# Patient Record
Sex: MALE | Race: WHITE | NOT HISPANIC OR LATINO | Employment: FULL TIME | URBAN - METROPOLITAN AREA
[De-identification: names, ages, dates, MRNs, and addresses within clinical notes are randomized per-mention and may not be internally consistent; named-entity substitution may affect disease eponyms.]

---

## 2017-03-20 ENCOUNTER — OFFICE VISIT (OUTPATIENT)
Dept: FAMILY MEDICINE CLINIC | Facility: CLINIC | Age: 52
End: 2017-03-20

## 2017-03-20 VITALS
HEART RATE: 75 BPM | OXYGEN SATURATION: 98 % | SYSTOLIC BLOOD PRESSURE: 128 MMHG | WEIGHT: 209.4 LBS | HEIGHT: 69 IN | DIASTOLIC BLOOD PRESSURE: 70 MMHG | BODY MASS INDEX: 31.01 KG/M2 | TEMPERATURE: 97.6 F

## 2017-03-20 DIAGNOSIS — J02.9 SORE THROAT: Primary | ICD-10-CM

## 2017-03-20 DIAGNOSIS — R53.81 MALAISE: ICD-10-CM

## 2017-03-20 DIAGNOSIS — G93.31 POSTVIRAL FATIGUE SYNDROME: ICD-10-CM

## 2017-03-20 DIAGNOSIS — J98.8 CONGESTION OF RESPIRATORY TRACT: ICD-10-CM

## 2017-03-20 DIAGNOSIS — J01.40 ACUTE PANSINUSITIS, RECURRENCE NOT SPECIFIED: ICD-10-CM

## 2017-03-20 DIAGNOSIS — R21 RASH: ICD-10-CM

## 2017-03-20 PROBLEM — IMO0001 CONGESTED: Status: ACTIVE | Noted: 2017-03-20

## 2017-03-20 PROBLEM — J32.4 PANSINUSITIS: Status: ACTIVE | Noted: 2017-03-20

## 2017-03-20 PROCEDURE — 99213 OFFICE O/P EST LOW 20 MIN: CPT | Performed by: INTERNAL MEDICINE

## 2017-03-20 RX ORDER — AMOXICILLIN 500 MG/1
1000 CAPSULE ORAL 2 TIMES DAILY
Qty: 40 CAPSULE | Refills: 0 | Status: SHIPPED | OUTPATIENT
Start: 2017-03-20 | End: 2017-08-29

## 2017-03-20 RX ORDER — CLOTRIMAZOLE AND BETAMETHASONE DIPROPIONATE 10; .64 MG/G; MG/G
CREAM TOPICAL 2 TIMES DAILY
Qty: 45 G | Refills: 2 | Status: SHIPPED | OUTPATIENT
Start: 2017-03-20 | End: 2017-08-29

## 2017-03-20 RX ORDER — AMOXICILLIN 500 MG/1
1000 CAPSULE ORAL 2 TIMES DAILY
Qty: 40 CAPSULE | Refills: 0 | Status: SHIPPED | OUTPATIENT
Start: 2017-03-20 | End: 2017-03-20 | Stop reason: SDUPTHER

## 2017-03-20 NOTE — PATIENT INSTRUCTIONS
Diagnoses and all orders for this visit:    Sore throat  Comments:  Salt water gargles  Labs tro check aso    Postviral fatigue syndrome  Comments:  Check mono test EBV titer  CBC and ASO titer    Rash lower extremities  Comments:  Rash on legs itching  Lotrisone Cream    Malaise  Comments:  Labs  Rest  Tylenol as needed    Congestion of respiratory tract  Comments:  Continue same meds    Acute pansinusitis, recurrence not specified  Comments:  Amoxil  Follow up as planned

## 2017-03-20 NOTE — PROGRESS NOTES
Ramirez Pena is a 51 y.o. male   Chief Complaint   Patient presents with   • Sore Throat   • Nasal Congestion       Subjective   History of Present Illness     Ramirez Pena is a 51 y.o.male who presents with:sore throat, cough and congestion.  Has blah, achy feel, sinusitis.  Sitting up okay but fatigued and dizzy.  Rash developed on back of the right leg just before this broke out.  Friend with knee replacement, he does not want to expose him to infections.      The following portions of the patient's history were reviewed and updated as appropriate: past medical history, surgeries, family history, allergies, current medications, past social history and problem list.    A comprehensive review of 14 systems was peformed  Review of Systems   Constitutional: Negative for chills, fatigue, fever and unexpected weight change.        Patient reports fatigue and dizziness as symptoms associated with his upper respiratory   HENT: Positive for congestion and sore throat. Negative for ear pain, hearing loss, sinus pressure and tinnitus.    Eyes: Negative for pain, discharge and redness.   Respiratory: Negative for cough, shortness of breath and wheezing.    Cardiovascular: Negative for chest pain, palpitations and leg swelling.   Gastrointestinal: Negative for abdominal pain, constipation, diarrhea and nausea.   Endocrine: Negative for cold intolerance and heat intolerance.   Genitourinary: Negative for difficulty urinating, flank pain and urgency.   Musculoskeletal: Negative for back pain, joint swelling and myalgias.   Skin: Negative for rash and wound.        Patient reports rash of lower extremities came on in the last 24 hours.   Allergic/Immunologic: Negative for environmental allergies and food allergies.   Neurological: Negative for dizziness, seizures, numbness and headaches.   Hematological: Negative for adenopathy. Does not bruise/bleed easily.   Psychiatric/Behavioral: Negative for decreased concentration,  "dysphoric mood and sleep disturbance. The patient is not nervous/anxious.    All other systems reviewed and are negative.      I have reviewed the patient's medical history in detail and updated the computerized patient record.      Objective   Vitals:    03/20/17 1832   BP: 128/70   BP Location: Left arm   Patient Position: Sitting   Cuff Size: Adult   Pulse: 75   Temp: 97.6 °F (36.4 °C)   TempSrc: Oral   SpO2: 98%   Weight: 209 lb 6.4 oz (95 kg)   Height: 69\" (175.3 cm)           Physical Exam   Constitutional: He is oriented to person, place, and time. He appears well-developed and well-nourished. He is active and cooperative.  Non-toxic appearance. No distress.   HENT:   Head: Normocephalic and atraumatic. Hair is normal.   Right Ear: Hearing, tympanic membrane, external ear and ear canal normal. No drainage.   Left Ear: Hearing, tympanic membrane, external ear and ear canal normal. No drainage.   Nose: Nose normal. No rhinorrhea, nasal deformity or septal deviation.   Mouth/Throat: Oropharynx is clear and moist.   Throat red and irritated   Eyes: Conjunctivae, EOM and lids are normal. Pupils are equal, round, and reactive to light. Right eye exhibits no exudate. Left eye exhibits no exudate. Right pupil is round and reactive. Left pupil is round and reactive.   Neck: Trachea normal and normal range of motion. Neck supple. Normal carotid pulses, no hepatojugular reflux and no JVD present. Carotid bruit is not present. No tracheal deviation, no edema and normal range of motion present. No thyroid mass and no thyromegaly present.   Cardiovascular: Normal rate, regular rhythm, normal heart sounds, intact distal pulses and normal pulses.   No extrasystoles are present. PMI is not displaced.    Pulmonary/Chest: Effort normal and breath sounds normal. No accessory muscle usage. No tachypnea. No respiratory distress.   Abdominal: Soft. Normal appearance, normal aorta and bowel sounds are normal. He exhibits no " abdominal bruit. There is no hepatosplenomegaly. There is no tenderness. Hernia confirmed negative in the right inguinal area and confirmed negative in the left inguinal area.   Bowel sounds present and normal in all four quadrants   Musculoskeletal: Normal range of motion.   Lymphadenopathy: No inguinal adenopathy noted on the right or left side.   Neurological: He is alert and oriented to person, place, and time. He has normal strength and normal reflexes. No cranial nerve deficit or sensory deficit. He displays a negative Romberg sign.   Skin: Skin is warm, dry and intact. He is not diaphoretic. No pallor.   Flat, macular, non-vesicular rash noted in one small patch on lower extremities is present bilaterally   Psychiatric: He has a normal mood and affect. His speech is normal and behavior is normal. Judgment and thought content normal. Cognition and memory are normal.   Nursing note and vitals reviewed.      Procedures    Reviewed consult notes from                         Assessment/Plan     Diagnoses and all orders for this visit:    Sore throat  Comments:  Salt water gargles  Labs tro check aso    Postviral fatigue syndrome  Comments:  Check mono test EBV titer  CBC and ASO titer    Rash lower extremities  Comments:  Rash on legs itching  Lotrisone Cream    Malaise  Comments:  Labs  Rest  Tylenol as needed    Congestion of respiratory tract  Comments:  Continue same meds    Acute pansinusitis, recurrence not specified  Comments:  Amoxil  Follow up as planned         Everton Zamora MD  3/20/2017  6:46 PM

## 2017-03-21 ENCOUNTER — DOCUMENTATION (OUTPATIENT)
Dept: FAMILY MEDICINE CLINIC | Facility: CLINIC | Age: 52
End: 2017-03-21

## 2017-03-25 ENCOUNTER — RESULTS ENCOUNTER (OUTPATIENT)
Dept: FAMILY MEDICINE CLINIC | Facility: CLINIC | Age: 52
End: 2017-03-25

## 2017-03-25 DIAGNOSIS — J02.9 SORE THROAT: ICD-10-CM

## 2017-03-25 DIAGNOSIS — R21 RASH: ICD-10-CM

## 2017-03-25 DIAGNOSIS — G93.31 POSTVIRAL FATIGUE SYNDROME: ICD-10-CM

## 2017-03-25 DIAGNOSIS — R53.81 MALAISE: ICD-10-CM

## 2017-03-25 DIAGNOSIS — J01.40 ACUTE PANSINUSITIS, RECURRENCE NOT SPECIFIED: ICD-10-CM

## 2017-03-25 DIAGNOSIS — J98.8 CONGESTION OF RESPIRATORY TRACT: ICD-10-CM

## 2017-03-25 LAB
ALBUMIN SERPL-MCNC: 4.5 G/DL (ref 3.5–5.2)
ALBUMIN/GLOB SERPL: 2.1 G/DL
ALP SERPL-CCNC: 54 U/L (ref 39–117)
ALT SERPL-CCNC: 44 U/L (ref 1–41)
ASO AB SERPL-ACNC: 246 IU/ML (ref 0–200)
AST SERPL-CCNC: 23 U/L (ref 1–40)
BASOPHILS # BLD AUTO: 0.01 10*3/MM3 (ref 0–0.2)
BASOPHILS NFR BLD AUTO: 0.2 % (ref 0–1.5)
BILIRUB SERPL-MCNC: 0.5 MG/DL (ref 0.1–1.2)
BUN SERPL-MCNC: 11 MG/DL (ref 6–20)
BUN/CREAT SERPL: 11.8 (ref 7–25)
CALCIUM SERPL-MCNC: 8.9 MG/DL (ref 8.6–10.5)
CHLORIDE SERPL-SCNC: 103 MMOL/L (ref 98–107)
CHOLEST SERPL-MCNC: 220 MG/DL (ref 0–200)
CO2 SERPL-SCNC: 24.7 MMOL/L (ref 22–29)
CREAT SERPL-MCNC: 0.93 MG/DL (ref 0.76–1.27)
CRP SERPL-MCNC: 0.15 MG/DL (ref 0–0.5)
EBV DNA # SERPL NAA+PROBE: NEGATIVE COPIES/ML
EBV DNA SPEC NAA+PROBE-LOG#: NORMAL LOG10 COPY/ML
EOSINOPHIL # BLD AUTO: 0.11 10*3/MM3 (ref 0–0.7)
EOSINOPHIL NFR BLD AUTO: 2.3 % (ref 0.3–6.2)
ERYTHROCYTE [DISTWIDTH] IN BLOOD BY AUTOMATED COUNT: 12.5 % (ref 11.5–14.5)
ERYTHROCYTE [SEDIMENTATION RATE] IN BLOOD BY WESTERGREN METHOD: 3 MM/HR (ref 0–20)
FT4I SERPL CALC-MCNC: 2.2 (ref 1.2–4.9)
GLOBULIN SER CALC-MCNC: 2.1 GM/DL
GLUCOSE SERPL-MCNC: 95 MG/DL (ref 65–99)
HCT VFR BLD AUTO: 43.7 % (ref 40.4–52.2)
HDLC SERPL-MCNC: 44 MG/DL (ref 40–60)
HGB BLD-MCNC: 14.8 G/DL (ref 13.7–17.6)
IMM GRANULOCYTES # BLD: 0.02 10*3/MM3 (ref 0–0.03)
IMM GRANULOCYTES NFR BLD: 0.4 % (ref 0–0.5)
LDLC SERPL CALC-MCNC: 141 MG/DL (ref 0–100)
LDLC/HDLC SERPL: 3.21 {RATIO}
LYMPHOCYTES # BLD AUTO: 1.04 10*3/MM3 (ref 0.9–4.8)
LYMPHOCYTES NFR BLD AUTO: 21.8 % (ref 19.6–45.3)
MCH RBC QN AUTO: 31.8 PG (ref 27–32.7)
MCHC RBC AUTO-ENTMCNC: 33.9 G/DL (ref 32.6–36.4)
MCV RBC AUTO: 94 FL (ref 79.8–96.2)
MONOCYTES # BLD AUTO: 0.71 10*3/MM3 (ref 0.2–1.2)
MONOCYTES NFR BLD AUTO: 14.9 % (ref 5–12)
NEUTROPHILS # BLD AUTO: 2.87 10*3/MM3 (ref 1.9–8.1)
NEUTROPHILS NFR BLD AUTO: 60.4 % (ref 42.7–76)
PLATELET # BLD AUTO: 145 10*3/MM3 (ref 140–500)
POTASSIUM SERPL-SCNC: 4.6 MMOL/L (ref 3.5–5.2)
PROT SERPL-MCNC: 6.6 G/DL (ref 6–8.5)
RBC # BLD AUTO: 4.65 10*6/MM3 (ref 4.6–6)
SODIUM SERPL-SCNC: 142 MMOL/L (ref 136–145)
T3RU NFR SERPL: 27 % (ref 24–39)
T4 SERPL-MCNC: 8.2 UG/DL (ref 4.5–12)
TRIGL SERPL-MCNC: 174 MG/DL (ref 0–150)
TSH SERPL DL<=0.005 MIU/L-ACNC: 3.33 UIU/ML (ref 0.45–4.5)
VLDLC SERPL CALC-MCNC: 34.8 MG/DL (ref 5–40)
WBC # BLD AUTO: 4.76 10*3/MM3 (ref 4.5–10.7)

## 2017-08-29 ENCOUNTER — OFFICE VISIT (OUTPATIENT)
Dept: FAMILY MEDICINE CLINIC | Facility: CLINIC | Age: 52
End: 2017-08-29

## 2017-08-29 VITALS
WEIGHT: 211 LBS | HEIGHT: 69 IN | DIASTOLIC BLOOD PRESSURE: 68 MMHG | HEART RATE: 70 BPM | BODY MASS INDEX: 31.25 KG/M2 | OXYGEN SATURATION: 97 % | TEMPERATURE: 98.8 F | SYSTOLIC BLOOD PRESSURE: 112 MMHG

## 2017-08-29 DIAGNOSIS — L23.7 POISON IVY DERMATITIS: Primary | ICD-10-CM

## 2017-08-29 PROCEDURE — 99213 OFFICE O/P EST LOW 20 MIN: CPT | Performed by: NURSE PRACTITIONER

## 2017-08-29 RX ORDER — PREDNISONE 10 MG/1
TABLET ORAL
Qty: 30 TABLET | Refills: 0 | Status: SHIPPED | OUTPATIENT
Start: 2017-08-29 | End: 2017-09-21

## 2017-08-29 RX ORDER — ALBUTEROL SULFATE 90 UG/1
2 AEROSOL, METERED RESPIRATORY (INHALATION)
COMMUNITY
Start: 2013-12-29 | End: 2019-06-11

## 2017-08-29 NOTE — PROGRESS NOTES
Carroll Pena is a 52 y.o. male presents with rash x 7 days, worsening on right hand and left hand and forearm.     Rash   This is a new problem. The current episode started in the past 7 days. The problem has been gradually worsening since onset. The affected locations include the right hand and left arm. The rash is characterized by blistering, redness and itchiness. He was exposed to plant contact. Pertinent negatives include no anorexia, congestion, cough, diarrhea, eye pain, facial edema, fatigue, fever, joint pain, nail changes, rhinorrhea, shortness of breath, sore throat or vomiting. Treatments tried: calamine. The treatment provided mild relief. There is no history of allergies, asthma, eczema or varicella.        The following portions of the patient's history were reviewed and updated as appropriate: allergies, current medications, past family history, past medical history, past social history, past surgical history and problem list.    Review of Systems   Constitutional: Negative.  Negative for fatigue and fever.   HENT: Negative.  Negative for congestion, rhinorrhea and sore throat.    Eyes: Negative.  Negative for pain.   Respiratory: Negative.  Negative for cough and shortness of breath.    Cardiovascular: Negative.    Gastrointestinal: Negative.  Negative for anorexia, diarrhea and vomiting.   Endocrine: Negative.    Genitourinary: Negative.    Musculoskeletal: Negative.  Negative for joint pain.   Skin: Positive for rash. Negative for nail changes.   Allergic/Immunologic: Negative.    Neurological: Negative.    Hematological: Negative.    Psychiatric/Behavioral: Negative.        Objective   Physical Exam   Constitutional: He is oriented to person, place, and time. He appears well-developed and well-nourished.   Cardiovascular: Normal rate, regular rhythm and normal heart sounds.  Exam reveals no gallop and no friction rub.    No murmur heard.  Pulmonary/Chest: Effort normal and breath  sounds normal. No respiratory distress. He has no wheezes. He has no rales.   Neurological: He is alert and oriented to person, place, and time.   Skin: Skin is warm and dry. Rash (vesicular linear lesions to right hand, with scattered vesicles between fingers, on left antecubital and abdomen. Pruritic. ) noted. There is erythema.   Vitals reviewed.      Assessment/Plan   Ramirez was seen today for poison ivy.    Diagnoses and all orders for this visit:    Poison ivy dermatitis    Other orders  -     predniSONE (DELTASONE) 10 MG tablet; Take 4 tabs x 3 days, then 3 tabs x 3 days, then 2 tabs x 3 days, then 1 tab x 3 days then discontinue

## 2017-08-29 NOTE — PATIENT INSTRUCTIONS
Poison Ivy Dermatitis  Poison ivy dermatitis is inflammation of the skin that is caused by the allergens on the leaves of the poison ivy plant. The skin reaction often involves redness, swelling, blisters, and extreme itching.  CAUSES  This condition is caused by a specific chemical (urushiol) found in the sap of the poison ivy plant. This chemical is sticky and can be easily spread to people, animals, and objects. You can get poison ivy dermatitis by:  · Having direct contact with a poison ivy plant.  · Touching animals, other people, or objects that have come in contact with poison ivy and have the chemical on them.  RISK FACTORS  This condition is more likely to develop in:  · People who are outdoors often.  · People who go outdoors without wearing protective clothing, such as closed shoes, long pants, and a long-sleeved shirt.  SYMPTOMS  Symptoms of this condition include:  · Redness and itching.  · A rash that often includes bumps and blisters. The rash usually appears 48 hours after exposure.  · Swelling. This may occur if the reaction is more severe.  Symptoms usually last for 1-2 weeks. However, the first time you develop this condition, symptoms may last 3-4 weeks.  DIAGNOSIS  This condition may be diagnosed based on your symptoms and a physical exam. Your health care provider may also ask you about any recent outdoor activity.  TREATMENT  Treatment for this condition will vary depending on how severe it is. Treatment may include:  · Hydrocortisone creams or calamine lotions to relieve itching.  · Oatmeal baths to soothe the skin.  · Over-the-counter antihistamine tablets.  · Oral steroid medicine for more severe outbreaks.  HOME CARE INSTRUCTIONS  · Take or apply over-the-counter and prescription medicines only as told by your health care provider.  · Wash exposed skin as soon as possible with soap and cold water.  · Use hydrocortisone creams or calamine lotion as needed to soothe the skin and relieve  itching.  · Take oatmeal baths as needed. Use colloidal oatmeal. You can get this at your local pharmacy or grocery store. Follow the instructions on the packaging.  · Do not scratch or rub your skin.  · While you have the rash, wash clothes right after you wear them.  PREVENTION  · Learn to identify the poison ivy plant and avoid contact with the plant. This plant can be recognized by the number of leaves. Generally, poison ivy has three leaves with flowering branches on a single stem. The leaves are typically glossy, and they have jagged edges that come to a point at the front.  · If you have been exposed to poison ivy, thoroughly wash with soap and water right away. You have about 30 minutes to remove the plant resin before it will cause the rash. Be sure to wash under your fingernails because any plant resin there will continue to spread the rash.  · When hiking or camping, wear clothes that will help you to avoid exposure on the skin. This includes long pants, a long-sleeved shirt, tall socks, and hiking boots. You can also apply preventive lotion to your skin to help limit exposure.  · If you suspect that your clothes or outdoor gear came in contact with poison ivy, rinse them off outside with a garden hose before you bring them inside your house.  SEEK MEDICAL CARE IF:  · You have open sores in the rash area.  · You have more redness, swelling, or pain in the affected area.  · You have redness that spreads beyond the rash area.  · You have fluid, blood, or pus coming from the affected area.  · You have a fever.  · You have a rash over a large area of your body.  · You have a rash on your eyes, mouth, or genitals.  · Your rash does not improve after a few days.  SEEK IMMEDIATE MEDICAL CARE IF:  · Your face swells or your eyes swell shut.    · You have trouble breathing.    · You have trouble swallowing.       This information is not intended to replace advice given to you by your health care provider. Make  sure you discuss any questions you have with your health care provider.     Document Released: 12/15/2001 Document Revised: 04/10/2017 Document Reviewed: 05/25/2016  [employer] Interactive Patient Education ©2017 [employer]

## 2017-09-21 ENCOUNTER — OFFICE VISIT (OUTPATIENT)
Dept: FAMILY MEDICINE CLINIC | Facility: CLINIC | Age: 52
End: 2017-09-21

## 2017-09-21 VITALS
OXYGEN SATURATION: 95 % | HEIGHT: 69 IN | BODY MASS INDEX: 31.16 KG/M2 | SYSTOLIC BLOOD PRESSURE: 120 MMHG | WEIGHT: 210.4 LBS | HEART RATE: 75 BPM | DIASTOLIC BLOOD PRESSURE: 88 MMHG | TEMPERATURE: 98.2 F

## 2017-09-21 DIAGNOSIS — J30.89 SEASONAL ALLERGIC RHINITIS DUE TO OTHER ALLERGIC TRIGGER: Primary | ICD-10-CM

## 2017-09-21 DIAGNOSIS — IMO0001 CONGESTED: ICD-10-CM

## 2017-09-21 PROCEDURE — 99213 OFFICE O/P EST LOW 20 MIN: CPT | Performed by: NURSE PRACTITIONER

## 2017-09-21 RX ORDER — AMOXICILLIN 875 MG/1
875 TABLET, COATED ORAL 2 TIMES DAILY
Qty: 20 TABLET | Refills: 0 | Status: SHIPPED | OUTPATIENT
Start: 2017-09-21 | End: 2018-08-01

## 2017-09-21 NOTE — PATIENT INSTRUCTIONS
Start Zyrtec D per label directions. You have been prescribed an antibiotic. Start this antibiotic if you develop sinus pain or worsening pressure, ear pain or fever with congestion as discussed. Increase fluids. Follow up for no improvement in symptoms.

## 2017-09-21 NOTE — PROGRESS NOTES
Subjective   Ramirez Pena is a 52 y.o. male presents for ongoing nasal congestion. Taking allegra once daily. Changed recently from zyrtec. Reports occasionally sneezing and ear congestion. Denies sore throat, sinus pain, cough or soa. Worse when laying down, increased sinus pressure. Reports increased snoring at night and dry mouth as a result.     URI    This is a new problem. The current episode started 1 to 4 weeks ago. The problem has been unchanged. There has been no fever. Associated symptoms include congestion, a plugged ear sensation and sneezing. Pertinent negatives include no abdominal pain, chest pain, coughing, diarrhea, dysuria, ear pain, headaches, joint pain, joint swelling, nausea, neck pain, rash, rhinorrhea, sinus pain, sore throat, swollen glands, vomiting or wheezing. He has tried antihistamine for the symptoms. The treatment provided mild relief.        The following portions of the patient's history were reviewed and updated as appropriate: allergies, current medications, past family history, past medical history, past social history, past surgical history and problem list.    Review of Systems   Constitutional: Negative.    HENT: Positive for congestion and sneezing. Negative for ear discharge, ear pain, postnasal drip, rhinorrhea, sinus pain and sore throat.    Eyes: Negative.    Respiratory: Negative.  Negative for cough and wheezing.    Cardiovascular: Negative.  Negative for chest pain.   Gastrointestinal: Negative.  Negative for abdominal pain, diarrhea, nausea and vomiting.   Endocrine: Negative.    Genitourinary: Negative.  Negative for dysuria.   Musculoskeletal: Negative.  Negative for joint pain and neck pain.   Skin: Negative.  Negative for rash.   Allergic/Immunologic: Negative.    Neurological: Negative.  Negative for headaches.   Hematological: Negative.    Psychiatric/Behavioral: Negative.        Objective   Physical Exam   Constitutional: He is oriented to person, place,  and time. He appears well-developed and well-nourished.   HENT:   Head: Normocephalic and atraumatic.   Cardiovascular: Normal rate, regular rhythm and normal heart sounds.  Exam reveals no gallop and no friction rub.    No murmur heard.  Pulmonary/Chest: Effort normal and breath sounds normal. No respiratory distress. He has no wheezes. He has no rales.   Abdominal: Soft. Bowel sounds are normal. He exhibits no distension. There is no tenderness.   Neurological: He is alert and oriented to person, place, and time.   Skin: Skin is warm and dry.   Psychiatric: He has a normal mood and affect.   Vitals reviewed.      Assessment/Plan   Ramirez was seen today for follow-up.    Diagnoses and all orders for this visit:    Congested    Seasonal allergic rhinitis due to other allergic trigger    Other orders  -     amoxicillin (AMOXIL) 875 MG tablet; Take 1 tablet by mouth 2 (Two) Times a Day.        A wait and see abx was prescribed. Instructed when to start antibiotic.

## 2018-03-20 ENCOUNTER — TELEPHONE (OUTPATIENT)
Dept: FAMILY MEDICINE CLINIC | Facility: CLINIC | Age: 53
End: 2018-03-20

## 2018-03-20 NOTE — TELEPHONE ENCOUNTER
Pt called and was wondering if we can call in claritin d? He cant buy anymore and he reached his limit he takes this as prescribed often cause of his allergies and sinuses?

## 2018-08-01 ENCOUNTER — OFFICE VISIT (OUTPATIENT)
Dept: FAMILY MEDICINE CLINIC | Facility: CLINIC | Age: 53
End: 2018-08-01

## 2018-08-01 VITALS
WEIGHT: 207 LBS | HEIGHT: 69 IN | HEART RATE: 77 BPM | BODY MASS INDEX: 30.66 KG/M2 | DIASTOLIC BLOOD PRESSURE: 70 MMHG | SYSTOLIC BLOOD PRESSURE: 122 MMHG | OXYGEN SATURATION: 98 % | TEMPERATURE: 98 F

## 2018-08-01 DIAGNOSIS — H60.391 OTHER INFECTIVE ACUTE OTITIS EXTERNA OF RIGHT EAR: ICD-10-CM

## 2018-08-01 DIAGNOSIS — J30.1 ALLERGIC RHINITIS DUE TO POLLEN, UNSPECIFIED CHRONICITY, UNSPECIFIED SEASONALITY: Primary | ICD-10-CM

## 2018-08-01 DIAGNOSIS — R06.83 SNORING: ICD-10-CM

## 2018-08-01 PROCEDURE — 99213 OFFICE O/P EST LOW 20 MIN: CPT | Performed by: NURSE PRACTITIONER

## 2018-08-01 RX ORDER — FEXOFENADINE HCL AND PSEUDOEPHEDRINE HCI 180; 240 MG/1; MG/1
1 TABLET, EXTENDED RELEASE ORAL DAILY
Qty: 30 TABLET | Refills: 5 | Status: SHIPPED | OUTPATIENT
Start: 2018-08-01 | End: 2019-06-11 | Stop reason: SDUPTHER

## 2018-08-01 RX ORDER — MONTELUKAST SODIUM 10 MG/1
10 TABLET ORAL NIGHTLY
Qty: 30 TABLET | Refills: 5 | Status: SHIPPED | OUTPATIENT
Start: 2018-08-01 | End: 2021-04-02 | Stop reason: SDUPTHER

## 2018-08-01 NOTE — PATIENT INSTRUCTIONS
Follow up with allergy/asthma.  May need future sleep study.  Work on weight loss.  Start allegra D and singulair.

## 2018-08-01 NOTE — PROGRESS NOTES
Subjective   Ramirez Pena is a 53 y.o. male presents with right sided sinus pain and ear pain, worse when laying down. Also with dry mouth, waking up at night snoring. Chronic sinus concerns, taking claritin D, symptoms are the same with or without allergy medication. Has been using this for a long time. Also using flonase daily. No previous allergy testing, was recommended about twenty years ago but insurance would not cover testing. Agreeable to proceed. Increased snoring, very loud where friends have made comments during recent travel. Denies daytime fatigue. Believes it is related to nasal congestion. Has considered sleep study, however states he will not be able to tolerate a cpap due to movement at night.     Sinus Problem   This is a chronic problem. The current episode started more than 1 year ago. The problem has been waxing and waning since onset. There has been no fever. He is experiencing no pain. Associated symptoms include congestion, ear pain (fullness) and sinus pressure (more on right side, will feel fluid shift ). Pertinent negatives include no chills, coughing, diaphoresis, headaches, hoarse voice, neck pain, shortness of breath, sneezing, sore throat or swollen glands. Past treatments include oral decongestants (antihistamines). The treatment provided mild relief.        The following portions of the patient's history were reviewed and updated as appropriate: allergies, current medications, past family history, past medical history, past social history, past surgical history and problem list.    Review of Systems   Constitutional: Negative.  Negative for chills, diaphoresis and fatigue.   HENT: Positive for congestion, ear pain (fullness), hearing loss (muffled, right), postnasal drip and sinus pressure (more on right side, will feel fluid shift ). Negative for ear discharge, hoarse voice, rhinorrhea, sneezing and sore throat.         Increased snoring, thought related to allergies      Respiratory: Negative for cough and shortness of breath.    Musculoskeletal: Negative for neck pain.   Neurological: Negative for headaches.       Objective   Physical Exam   Constitutional: He appears well-developed and well-nourished.   HENT:   Head: Normocephalic and atraumatic.   Right Ear: Tympanic membrane normal.   Left Ear: Tympanic membrane and ear canal normal.   Canal edematous, minimal erythema, denies pain with tragus or pinna manipulation   Eyes: Pupils are equal, round, and reactive to light.   Neck: Neck supple.   Cardiovascular: Normal rate, regular rhythm and normal heart sounds.  Exam reveals no gallop and no friction rub.    No murmur heard.  Pulmonary/Chest: Effort normal and breath sounds normal. No respiratory distress. He has no wheezes. He has no rales.   Abdominal: Soft. Bowel sounds are normal. He exhibits no distension. There is no tenderness.   Skin: Skin is warm and dry.   Psychiatric: He has a normal mood and affect.   Vitals reviewed.      Assessment/Plan   Ramirez was seen today for sinusitis.    Diagnoses and all orders for this visit:    Allergic rhinitis due to pollen, unspecified chronicity, unspecified seasonality  -     Ambulatory Referral to Allergy    Other infective acute otitis externa of right ear    Other orders  -     fexofenadine-pseudoephedrine (ALLEGRA-D ALLERGY & CONGESTION) 180-240 MG per 24 hr tablet; Take 1 tablet by mouth Daily.  -     montelukast (SINGULAIR) 10 MG tablet; Take 1 tablet by mouth Every Night.  -     neomycin-polymyxin-hydrocortisone (CORTISPORIN) 3.5-31625-9 otic solution; Administer 3 drops to the right ear 3 (Three) Times a Day.    would recommend daily allergy medication, will change from claritin D to allegra D.  Continue flonase, will add singulair.  With regards to snoring, could possibly be looking at sleep apnea, discussed after allergy evaluation, next step may be sleep study. Patient reluctant. Will work on weight loss. Does not want  to use cpap

## 2018-10-02 ENCOUNTER — OFFICE VISIT (OUTPATIENT)
Dept: SLEEP MEDICINE | Facility: HOSPITAL | Age: 53
End: 2018-10-02
Attending: INTERNAL MEDICINE

## 2018-10-02 VITALS
TEMPERATURE: 98.8 F | OXYGEN SATURATION: 96 % | DIASTOLIC BLOOD PRESSURE: 99 MMHG | HEIGHT: 69 IN | BODY MASS INDEX: 29.68 KG/M2 | SYSTOLIC BLOOD PRESSURE: 148 MMHG | HEART RATE: 88 BPM | WEIGHT: 200.4 LBS

## 2018-10-02 DIAGNOSIS — G47.33 OSA (OBSTRUCTIVE SLEEP APNEA): ICD-10-CM

## 2018-10-02 DIAGNOSIS — R06.83 SNORING: Primary | ICD-10-CM

## 2018-10-02 PROCEDURE — G0463 HOSPITAL OUTPT CLINIC VISIT: HCPCS

## 2018-10-02 NOTE — PROGRESS NOTES
[facility]      Ramirez Pena  53 y.o.  male  1965    PCP:Carmina Awad APRN  Referring physician: George Olivier MD    Type of service: Initial consult.    Chief complaint: Snoring,       History of present illness;  This is a 53 y.o. male being referred for evaluation of sleep apnea.  The patient reports symptoms of snoring, daytime excessive sleepiness and fatigue.  In addition patient also gives a history of waking up choking and gasping for breath.  Normally goes to bed around 11p.m. and wakes up around 7 a.m, still feels not rested well and tired.       Past Medical History:   Diagnosis Date   • Depression      Allergies   Witnssed apneas    Medications:    Current Outpatient Prescriptions:   •  albuterol (PROVENTIL HFA;VENTOLIN HFA) 108 (90 Base) MCG/ACT inhaler, Inhale 2 puffs., Disp: , Rfl:   •  fexofenadine-pseudoephedrine (ALLEGRA-D ALLERGY & CONGESTION) 180-240 MG per 24 hr tablet, Take 1 tablet by mouth Daily., Disp: 30 tablet, Rfl: 5  •  montelukast (SINGULAIR) 10 MG tablet, Take 1 tablet by mouth Every Night., Disp: 30 tablet, Rfl: 5  •  Multiple Vitamins-Minerals (MENS MULTI VITAMIN & MINERAL PO), Take  by mouth., Disp: , Rfl:   •  neomycin-polymyxin-hydrocortisone (CORTISPORIN) 3.5-85668-2 otic solution, Administer 3 drops to the right ear 3 (Three) Times a Day., Disp: 10 mL, Rfl: 0    Social history:  Shift work: no  Tobacco use: 1 cigar  Alcohol use: 1  Caffeinated drinks: 6-7  Over-the-counter sleeping aid: no  Narcotic medications: no    Review of systems:  [place] Sleepiness Scale: Total score: 2   Positive for snoring, witnessed apneas, fatigue and daytime excessive sleepiness,   Negative for shortness of breath, cough, wheezing, chest pain, nausea and vomiting, Swelling of feet  Morning symptoms  Dry mouth no  Moring headaches no  Nasal Congestion no  Leg movements no  Nocturia (how many times/night) 2  Memory Problems no    Physical  "exam:  Vitals:    10/02/18 1406   BP: 148/99   BP Location: Left arm   Patient Position: Sitting   Pulse: 88   Temp: 98.8 °F (37.1 °C)   TempSrc: Oral   SpO2: 96%   Weight: 90.9 kg (200 lb 6.4 oz)   Height: 175.3 cm (69\")    Body mass index is 29.59 kg/m². Neck Circumference: 17 inches  HEENT: Head is atraumatic, pupils are round equal and reacting to light,  no nasal septal defects or deviation and the nasal passages are clear, tonsils are not enlarged, oral airway Mallampati class III  NECK: No lymphadenopathy, trachea is in the midline  RESPIRATORY SYSTEM: Breath sounds are equal on both sides, there are no wheezes or crackles  CARDIOVASULAR SYSTEM: Heart sounds are regular and normal, there are no murmurs or thrills  ABDOMEN: Soft, no hepatosplenomegaly, no evidence of ascites  EXTREMITES: No cyanosis, clubbing or edema   NEUROLOGICAL SYSTEM: Oriented x 3, no gross neurological defects    Assessment and plan:  · Obstructive sleep apnea:  I strongly suspect the patient has sleep apnea as suggested by the symptoms and physical examination.  I have talked to the patient about the signs and symptoms of sleep apnea and consequences of untreated sleep apnea.  I'm going to order a home sleep test.  Will have a follow-up after this sleep test is done  · History of allergies  · Snoring      Kameron Nolasco MD, [facility]  Pulmonary, Critical Care and sleep Medicine     "

## 2018-10-18 ENCOUNTER — HOSPITAL ENCOUNTER (OUTPATIENT)
Dept: SLEEP MEDICINE | Facility: HOSPITAL | Age: 53
Discharge: HOME | End: 2018-10-18
Attending: INTERNAL MEDICINE | Admitting: INTERNAL MEDICINE

## 2018-10-18 DIAGNOSIS — R06.83 SNORING: ICD-10-CM

## 2018-10-18 DIAGNOSIS — G47.33 OSA (OBSTRUCTIVE SLEEP APNEA): ICD-10-CM

## 2018-10-18 PROCEDURE — 95806 SLEEP STUDY UNATT&RESP EFFT: CPT

## 2018-11-05 ENCOUNTER — TELEPHONE (OUTPATIENT)
Dept: SLEEP MEDICINE | Facility: HOSPITAL | Age: 53
End: 2018-11-05

## 2018-11-05 NOTE — TELEPHONE ENCOUNTER
"Spoke with patient about HST results and MD's recommendation for CPAP treatment.  Pt is amenable to CPAP trial. Pt does not have preference of DME provider, pt wanting someone \"in network\" for [facility].  Spoke to Juliane at [facility] to verify participation with pt's provider.  Per Juliane, [facility] is in network with majority of [facility] plans. Faxing setup to [facility]. jbo  "

## 2018-12-20 ENCOUNTER — OFFICE VISIT (OUTPATIENT)
Dept: SLEEP MEDICINE | Facility: HOSPITAL | Age: 53
End: 2018-12-20
Attending: INTERNAL MEDICINE

## 2018-12-20 VITALS
BODY MASS INDEX: 30.16 KG/M2 | OXYGEN SATURATION: 97 % | HEIGHT: 69 IN | DIASTOLIC BLOOD PRESSURE: 76 MMHG | HEART RATE: 75 BPM | SYSTOLIC BLOOD PRESSURE: 134 MMHG | WEIGHT: 203.6 LBS

## 2018-12-20 DIAGNOSIS — G47.33 OSA ON CPAP: Primary | ICD-10-CM

## 2018-12-20 DIAGNOSIS — Z99.89 OSA ON CPAP: Primary | ICD-10-CM

## 2018-12-20 PROBLEM — R06.83 SNORING: Status: RESOLVED | Noted: 2018-10-02 | Resolved: 2018-12-20

## 2018-12-20 PROCEDURE — G0463 HOSPITAL OUTPT CLINIC VISIT: HCPCS

## 2018-12-20 NOTE — PROGRESS NOTES
"  SLEEP CLINIC FOLLOW UP PROGRESS NOTE.    [facility] SLEEP [facility]IC[facility]    Ramirez Pena  53 y.o.  male  1965    PCP: Carmina Awad APRN      Date of visit: 12/20/2018    INTERM HISTORY:  This is a 53 y.o. years old patient who has a history of sleep apnea AHI 12.5/hr and is on autoCPAP.  Patient reports good compliance with the device and has no problems.     The Smart card download has been reviewed and shows the following..  Compliance;97 %  > 4 hr use, 97 %  Residual AHI: 2.0 /hr (normal less than 5)      PAST MEDICAL HISTORY:  · Obstructive sleep apnea  Past Medical History:   Diagnosis Date   • Depression        MEDICATIONS:    Current Outpatient Medications:   •  albuterol (PROVENTIL HFA;VENTOLIN HFA) 108 (90 Base) MCG/ACT inhaler, Inhale 2 puffs., Disp: , Rfl:   •  fexofenadine-pseudoephedrine (ALLEGRA-D ALLERGY & CONGESTION) 180-240 MG per 24 hr tablet, Take 1 tablet by mouth Daily., Disp: 30 tablet, Rfl: 5  •  montelukast (SINGULAIR) 10 MG tablet, Take 1 tablet by mouth Every Night., Disp: 30 tablet, Rfl: 5  •  Multiple Vitamins-Minerals (MENS MULTI VITAMIN & MINERAL PO), Take  by mouth., Disp: , Rfl:   •  neomycin-polymyxin-hydrocortisone (CORTISPORIN) 3.5-07253-1 otic solution, Administer 3 drops to the right ear 3 (Three) Times a Day., Disp: 10 mL, Rfl: 0    No Known Allergies    SOCIAL, FAMILY HISTORY: Medical records are reviewed and noted.    REVIEW OF SYSTEMS:   [place] Sleepiness Scale :Total score: 2   Snoring no  Feels refreshed after waking up: yes  Morning headaches no  Nasal congestion no  Leg movements no    PHYSICAL EXAMINATION:  Vitals:    12/20/18 1500   BP: 134/76   Pulse: 75   SpO2: 97%   Weight: 92.4 kg (203 lb 9.6 oz)   Height: 175.3 cm (69\")    Body mass index is 30.07 kg/m². Neck Circumference: 17.5 inches  HEENT: Unremarkable, pupils are round equal and reacting to light, nasal passage is clear   NECK: No lymphadenopathy, throat is clear, oral airway " Mallampati class III  RESPRATORY SYSTEM: Breath sounds are equal on both sides and are normal, no wheezes or crackles  CARDIOVASULAR SYSTEM: Heart rate is regular without murmur  ABDOMEN: Soft, no ascites, no hepatosplenomegaly.  EXTREMITIES: No cyanosis, clubbing or edema       ASSESSMENT AND PLAN:  · Obstructive sleep apnea, patient is using the CPAP with good compliance for treatment of sleep apnea.  I have reviewed the smart card down load and discussed with the patient the download data and encouarged the patient to continue to use the CPAP. The residual AHI is acceptable.  The device is benefiting the patient.  The patient is also instructed to get the CPAP supplies from the DME company and see me in 1 year for follow-up.  The DME company is [employer], he wants to change DME  · Obesity, I have discussed weight reduction and the health benefits.  I have also discuss the relationship between the weight and the sleep apnea and encouraged the patient to lose weight  · Snoring, resolved      Kameron Nolasco MD, FCCP  Pulmonary, Critical Care and sleep Medicine

## 2019-06-11 ENCOUNTER — OFFICE VISIT (OUTPATIENT)
Dept: FAMILY MEDICINE CLINIC | Facility: CLINIC | Age: 54
End: 2019-06-11

## 2019-06-11 ENCOUNTER — TELEPHONE (OUTPATIENT)
Dept: FAMILY MEDICINE CLINIC | Facility: CLINIC | Age: 54
End: 2019-06-11

## 2019-06-11 VITALS
RESPIRATION RATE: 16 BRPM | SYSTOLIC BLOOD PRESSURE: 136 MMHG | WEIGHT: 205.1 LBS | HEART RATE: 72 BPM | OXYGEN SATURATION: 98 % | DIASTOLIC BLOOD PRESSURE: 80 MMHG | BODY MASS INDEX: 30.38 KG/M2 | HEIGHT: 69 IN | TEMPERATURE: 98 F

## 2019-06-11 DIAGNOSIS — J02.9 SORE THROAT: ICD-10-CM

## 2019-06-11 DIAGNOSIS — Z20.818 EXPOSURE TO STREP THROAT: ICD-10-CM

## 2019-06-11 DIAGNOSIS — J06.9 ACUTE URI: Primary | ICD-10-CM

## 2019-06-11 LAB
EXPIRATION DATE: NORMAL
INTERNAL CONTROL: NORMAL
Lab: NORMAL
S PYO AG THROAT QL: NEGATIVE

## 2019-06-11 PROCEDURE — 99213 OFFICE O/P EST LOW 20 MIN: CPT | Performed by: NURSE PRACTITIONER

## 2019-06-11 PROCEDURE — 87880 STREP A ASSAY W/OPTIC: CPT | Performed by: NURSE PRACTITIONER

## 2019-06-11 RX ORDER — FEXOFENADINE HCL AND PSEUDOEPHEDRINE HCI 180; 240 MG/1; MG/1
1 TABLET, EXTENDED RELEASE ORAL DAILY
Qty: 30 TABLET | Refills: 5 | Status: SHIPPED | OUTPATIENT
Start: 2019-06-11 | End: 2020-06-17

## 2019-06-11 RX ORDER — AMOXICILLIN AND CLAVULANATE POTASSIUM 875; 125 MG/1; MG/1
1 TABLET, FILM COATED ORAL EVERY 12 HOURS SCHEDULED
Qty: 14 TABLET | Refills: 0 | Status: SHIPPED | OUTPATIENT
Start: 2019-06-11 | End: 2020-02-12

## 2019-06-11 RX ORDER — AZELASTINE 1 MG/ML
SPRAY, METERED NASAL
Refills: 6 | COMMUNITY
Start: 2019-04-22 | End: 2021-04-02 | Stop reason: SDUPTHER

## 2019-06-11 RX ORDER — METHYLPREDNISOLONE 4 MG/1
TABLET ORAL
Qty: 21 EACH | Refills: 0 | Status: SHIPPED | OUTPATIENT
Start: 2019-06-11 | End: 2020-02-12

## 2019-06-11 NOTE — PROGRESS NOTES
Subjective   Ramirez Pena is a 53 y.o. male.   Presents with 3 day history of nasal congestion, sore throat, mildly improved today, scratchy throat, no known fever or chills.   URI    This is a new problem. The current episode started in the past 7 days. The problem has been unchanged. There has been no fever. Associated symptoms include congestion and a sore throat. Pertinent negatives include no abdominal pain, chest pain, coughing, diarrhea, dysuria, ear pain, headaches, joint pain, joint swelling, nausea, neck pain, plugged ear sensation, rash, rhinorrhea, sinus pain, sneezing, swollen glands, vomiting or wheezing. He has tried nothing for the symptoms. The treatment provided no relief.        The following portions of the patient's history were reviewed and updated as appropriate: allergies, current medications, past family history, past medical history, past social history, past surgical history and problem list.    Review of Systems   Constitutional: Negative for activity change, appetite change, fatigue and fever.   HENT: Positive for congestion, postnasal drip and sore throat. Negative for ear pain, rhinorrhea, sinus pressure, sinus pain, sneezing and trouble swallowing.    Eyes: Negative.    Respiratory: Negative for cough, chest tightness, shortness of breath and wheezing.    Cardiovascular: Negative for chest pain.   Gastrointestinal: Negative for abdominal pain, diarrhea, nausea and vomiting.   Genitourinary: Negative for dysuria.   Musculoskeletal: Negative for arthralgias, joint pain and neck pain.   Skin: Negative for rash.   Neurological: Negative for dizziness and headaches.       Objective   Physical Exam   Constitutional: He is oriented to person, place, and time. He appears well-developed and well-nourished. No distress.   HENT:   Head: Normocephalic and atraumatic.   Right Ear: Tympanic membrane, external ear and ear canal normal.   Left Ear: Tympanic membrane, external ear and ear canal  normal.   Nose: Mucosal edema present. Right sinus exhibits no maxillary sinus tenderness and no frontal sinus tenderness. Left sinus exhibits no maxillary sinus tenderness and no frontal sinus tenderness.   Mouth/Throat: Uvula is midline and mucous membranes are normal. Posterior oropharyngeal erythema present. No oropharyngeal exudate.   Neck: Neck supple.   Cardiovascular: Normal rate, regular rhythm and normal heart sounds. Exam reveals no gallop and no friction rub.   No murmur heard.  Pulmonary/Chest: Effort normal and breath sounds normal. No respiratory distress. He has no wheezes. He has no rales.   Abdominal: Soft. Bowel sounds are normal. He exhibits no distension. There is no tenderness.   Neurological: He is alert and oriented to person, place, and time.   Skin: Skin is warm and dry. He is not diaphoretic.   Psychiatric: He has a normal mood and affect.   Vitals reviewed.      Assessment/Plan   Ramirez was seen today for nasal congestion.    Diagnoses and all orders for this visit:    Acute URI    Exposure to strep throat  -     POC Rapid Strep A    Sore throat  -     POC Rapid Strep A    Other orders  -     methylPREDNISolone (MEDROL, DOMINICK,) 4 MG tablet; Take as directed on package instructions.  -     fexofenadine-pseudoephedrine (ALLEGRA-D ALLERGY & CONGESTION) 180-240 MG per 24 hr tablet; Take 1 tablet by mouth Daily.  -     amoxicillin-clavulanate (AUGMENTIN) 875-125 MG per tablet; Take 1 tablet by mouth Every 12 (Twelve) Hours.      Lab Results   Component Value Date    RAPSCRN Negative 06/11/2019       Strep negative  Start medrol dose pack to reduce inflammation  Resume allegra d, short term  augmentin for wait and see abx  Increase fluids, follow up for no improvement or worsening symptoms

## 2019-12-12 ENCOUNTER — OFFICE VISIT (OUTPATIENT)
Dept: SLEEP MEDICINE | Facility: HOSPITAL | Age: 54
End: 2019-12-12
Attending: INTERNAL MEDICINE

## 2019-12-12 VITALS
OXYGEN SATURATION: 98 % | DIASTOLIC BLOOD PRESSURE: 92 MMHG | SYSTOLIC BLOOD PRESSURE: 157 MMHG | BODY MASS INDEX: 30.36 KG/M2 | HEART RATE: 80 BPM | HEIGHT: 69 IN | WEIGHT: 205 LBS

## 2019-12-12 DIAGNOSIS — Z99.89 OSA ON CPAP: Primary | ICD-10-CM

## 2019-12-12 DIAGNOSIS — G47.33 OSA ON CPAP: Primary | ICD-10-CM

## 2019-12-12 PROCEDURE — G0463 HOSPITAL OUTPT CLINIC VISIT: HCPCS

## 2019-12-12 PROCEDURE — 99213 OFFICE O/P EST LOW 20 MIN: CPT | Performed by: INTERNAL MEDICINE

## 2019-12-12 NOTE — PROGRESS NOTES
[facility]  [address] [facility]  [address]rd [place] [address]  Phone   Fax       SLEEP CLINIC FOLLOW UP PROGRESS NOTE.    Ramirez SHEN Pena  1965  54 y.o.  male      PCP: Carmina Awad APRN      Date of visit: 12/12/2019    Chief Complaint   Patient presents with   • Sleep Apnea   • Follow-up       INTERM HISTORY:  This is a 54 y.o. years old patient who has a history of sleep apnea and is on CPAP.  Patient reports good compliance with the device.  Patient has CPAP for the past year and using the machine on a regular basis and has no problems.  He wants to get a so clean and he wanted to know my opinion which I have told him that it is a good idea to have so clean as he is forgetting to clean the equipment on a regular basis    Sleep schedule  Normally goes to bed at 11 PM  Wakes up at 7;30 AM  Feels refreshed after waking up: Yes      The Smart card downloaded on 12/12/2019 has been reviewed by me and shows the following..  Compliance; 99 %  > 4 hr use, 86 %  Average use of the device 7 hours and 16 minutes per night  Residual AHI: 0.9 /hr (normal less than 5)  Mask type: Nasal mask  DME: [employer]      Past Medical History:   Diagnosis Date   • Depression    • ROBYN on CPAP     AHI 12.5       MEDICATIONS: reviewed by me    Current Outpatient Medications:   •  amoxicillin-clavulanate (AUGMENTIN) 875-125 MG per tablet, Take 1 tablet by mouth Every 12 (Twelve) Hours., Disp: 14 tablet, Rfl: 0  •  azelastine (ASTELIN) 0.1 % nasal spray, INHALE 2 SPRAYS IEN QHS, Disp: , Rfl: 6  •  fexofenadine-pseudoephedrine (ALLEGRA-D ALLERGY & CONGESTION) 180-240 MG per 24 hr tablet, Take 1 tablet by mouth Daily., Disp: 30 tablet, Rfl: 5  •  methylPREDNISolone (MEDROL, DOMINICK,) 4 MG tablet, Take as directed on package instructions., Disp: 21 each, Rfl: 0  •  montelukast (SINGULAIR) 10 MG tablet, Take 1 tablet by mouth Every Night., Disp: 30 tablet, Rfl: 5  •  Multiple Vitamins-Minerals  "(MENS MULTI VITAMIN & MINERAL PO), Take  by mouth., Disp: , Rfl:   •  neomycin-polymyxin-hydrocortisone (CORTISPORIN) 3.5-10622-4 otic solution, Administer 3 drops to the right ear 3 (Three) Times a Day., Disp: 10 mL, Rfl: 0    No Known Allergies reviewed by me    SOCIAL, FAMILY HISTORY: Medical records are reviewed and noted by me.    REVIEW OF SYSTEMS:   [place] Sleepiness Scale :Total score: 4   Snoring: No  Morning headache: No  Nasal congestion: No  Leg movements: No  Number of times you wake up once asleep: 1  Heart burn no    PHYSICAL EXAMINATION:  Vitals:    12/12/19 1500   BP: 157/92   BP Location: Left arm   Patient Position: Sitting   Pulse: 80   SpO2: 98%   Weight: 93 kg (205 lb)   Height: 175.3 cm (69\")    Body mass index is 30.27 kg/m².    HEENT: pupils are round equal and reacting to light and accommodation, nasal passage is clear, no nasal polyps, no lymphadenopathy, throat is clear, oral airway Mallampati class 3  RESPRATORY SYSTEM: Breath sounds are equal on both sides and are normal, no wheezes or crackles  CARDIOVASULAR SYSTEM: Heart rate is regular without murmur  ABDOMEN: Soft, no ascites, no hepatosplenomegaly.  EXTREMITIES: No cyanosis, clubbing or edema       ASSESSMENT AND PLAN:  · Obstructive sleep apnea, patient is using the CPAP with good compliance for treatment of sleep apnea.  I have reviewed the smart card down load and discussed with the patient the download data and encouarged the patient to continue to use the CPAP.  His symptoms have improved and his sleep apnea has improved.  The residual AHI is acceptable.  The device is benefiting the patient.  The patient is also instructed to get the CPAP supplies from the [employer] and see me in 1 year for follow-up.  The prescription for supplies has been sent to the DME company.    · Obesity, patient's BMI is Body mass index is 30.27 kg/m²..  I have discussed weight reduction and the health benefits.  I have also discuss the " relationship between the weight and sleep apnea and encouraged the patient to lose weight which is beneficial in treating sleep apnea. Discussed diet and exercise with the patient.          Kameron Nolasco MD, [facility]  Sleep Medicine.(Board-certified)  [facility]   12/12/2019

## 2019-12-19 ENCOUNTER — TELEPHONE (OUTPATIENT)
Dept: SLEEP MEDICINE | Facility: HOSPITAL | Age: 54
End: 2019-12-19

## 2020-02-12 ENCOUNTER — OFFICE VISIT (OUTPATIENT)
Dept: FAMILY MEDICINE CLINIC | Facility: CLINIC | Age: 55
End: 2020-02-12

## 2020-02-12 VITALS
TEMPERATURE: 98.4 F | HEART RATE: 100 BPM | WEIGHT: 213.1 LBS | OXYGEN SATURATION: 99 % | SYSTOLIC BLOOD PRESSURE: 136 MMHG | BODY MASS INDEX: 31.56 KG/M2 | HEIGHT: 69 IN | DIASTOLIC BLOOD PRESSURE: 88 MMHG

## 2020-02-12 DIAGNOSIS — K59.00 CONSTIPATION, UNSPECIFIED CONSTIPATION TYPE: ICD-10-CM

## 2020-02-12 DIAGNOSIS — J01.00 ACUTE NON-RECURRENT MAXILLARY SINUSITIS: Primary | ICD-10-CM

## 2020-02-12 PROCEDURE — 99213 OFFICE O/P EST LOW 20 MIN: CPT | Performed by: NURSE PRACTITIONER

## 2020-02-12 RX ORDER — PREDNISONE 20 MG/1
20 TABLET ORAL DAILY
Qty: 5 TABLET | Refills: 0 | Status: SHIPPED | OUTPATIENT
Start: 2020-02-12 | End: 2020-06-17

## 2020-02-12 RX ORDER — AMOXICILLIN AND CLAVULANATE POTASSIUM 875; 125 MG/1; MG/1
1 TABLET, FILM COATED ORAL EVERY 12 HOURS SCHEDULED
Qty: 20 TABLET | Refills: 0 | Status: SHIPPED | OUTPATIENT
Start: 2020-02-12 | End: 2020-06-17

## 2020-02-12 RX ORDER — FLUTICASONE PROPIONATE 50 MCG
2 SPRAY, SUSPENSION (ML) NASAL DAILY
COMMUNITY

## 2020-02-12 NOTE — PROGRESS NOTES
"Carroll Pena is a 54 y.o. male   who presents for   Chief Complaint   Patient presents with   • Sinus Problem     x4days   • Nasal Congestion   • Constipation     from thursday to today     Four day history  Pain in nasal cavity with blowing nose, bilateral maxillary sinus pain  Ear congestion, painful  Sinus congestion, was initially white, now yellow, using cpap which is irritating symptoms  Received allergy injections on monday  Using normal saline rinse, thick globs, feels improved immediately, but then rapidly worsened  Chronic allergies  No known fever, but will get ice cold at times    constipation    /88   Pulse 100   Temp 98.4 °F (36.9 °C)   Ht 175.3 cm (69\")   Wt 96.7 kg (213 lb 1.6 oz)   SpO2 99%   BMI 31.47 kg/m²       History of Present Illness   Constipation   This is a new problem. The current episode started in the past 7 days. The problem is unchanged. His stool frequency is 2 to 3 times per week. The stool is described as firm. The patient is not on a high fiber diet (recently resumed metamucil). He does not exercise regularly. There has not been adequate water intake. Pertinent negatives include no abdominal pain, anorexia, back pain, bloating, diarrhea, difficulty urinating, fecal incontinence, fever, flatus, hematochezia, hemorrhoids, melena, nausea, rectal pain, vomiting or weight loss. He has tried fiber for the symptoms. The treatment provided mild relief.   Sinusitis   This is a new problem. The current episode started in the past 7 days. The problem has been gradually worsening since onset. There has been no fever. His pain is at a severity of 3/10. The pain is mild. Associated symptoms include congestion, coughing (minimal), ear pain, headaches and sinus pressure. Pertinent negatives include no chills, diaphoresis, hoarse voice, neck pain, shortness of breath, sneezing, sore throat or swollen glands. Past treatments include oral decongestants and saline sprays. " The treatment provided mild relief.       The following portions of the patient's history were reviewed and updated as appropriate: allergies, current medications, past family history, past medical history, past social history, past surgical history and problem list.    Review of Systems  Review of Systems   Constitutional: Negative for activity change, chills, diaphoresis, fatigue, fever and weight loss.   HENT: Positive for congestion, ear pain, postnasal drip, rhinorrhea, sinus pressure and sinus pain. Negative for hoarse voice, sneezing and sore throat.    Respiratory: Positive for cough (minimal). Negative for shortness of breath and wheezing.    Gastrointestinal: Positive for constipation. Negative for abdominal distention, abdominal pain, anorexia, bloating, blood in stool, diarrhea, flatus, hematochezia, hemorrhoids, melena, nausea, rectal pain and vomiting.   Genitourinary: Negative for difficulty urinating.   Musculoskeletal: Negative for back pain and neck pain.   Neurological: Positive for headaches.       Objective   Physical Exam  Physical Exam   Constitutional: He is oriented to person, place, and time. He appears well-developed and well-nourished.   HENT:   Head: Normocephalic and atraumatic.   Right Ear: Ear canal normal. Tympanic membrane is erythematous. Tympanic membrane is not retracted and not bulging.   Left Ear: Tympanic membrane and ear canal normal. Tympanic membrane is not erythematous and not bulging.   Nose: No mucosal edema. Right sinus exhibits maxillary sinus tenderness. Right sinus exhibits no frontal sinus tenderness. Left sinus exhibits maxillary sinus tenderness. Left sinus exhibits no frontal sinus tenderness.   Mouth/Throat: Uvula is midline, oropharynx is clear and moist and mucous membranes are normal. No posterior oropharyngeal erythema. No tonsillar exudate.   Eyes: Pupils are equal, round, and reactive to light. Right eye exhibits no discharge.   Neck: Neck supple.    Cardiovascular: Normal rate, regular rhythm and normal heart sounds. Exam reveals no gallop and no friction rub.   No murmur heard.  Pulmonary/Chest: Effort normal and breath sounds normal. No respiratory distress. He has no wheezes. He has no rales.   Lymphadenopathy:     He has no cervical adenopathy.   Neurological: He is alert and oriented to person, place, and time.   Skin: Skin is warm and dry.   Psychiatric: He has a normal mood and affect.   Vitals reviewed.        Assessment/Plan   Ramirez was seen today for sinus problem, nasal congestion and constipation.    Diagnoses and all orders for this visit:    Acute non-recurrent maxillary sinusitis    Constipation, unspecified constipation type    Other orders  -     amoxicillin-clavulanate (AUGMENTIN) 875-125 MG per tablet; Take 1 tablet by mouth Every 12 (Twelve) Hours.  -     predniSONE (DELTASONE) 20 MG tablet; Take 1 tablet by mouth Daily.    abx and prednisone as prescribed  Increase fluids, especially with metamucil  May add MOM prn for constipation for quicker action  Follow up for worsening or no improvement in symptoms

## 2020-03-18 ENCOUNTER — HOSPITAL ENCOUNTER (OUTPATIENT)
Facility: HOSPITAL | Age: 55
Setting detail: OBSERVATION
Discharge: HOME | End: 2020-03-19
Attending: EMERGENCY MEDICINE | Admitting: INTERNAL MEDICINE

## 2020-03-18 ENCOUNTER — APPOINTMENT (OUTPATIENT)
Dept: MRI IMAGING | Facility: HOSPITAL | Age: 55
End: 2020-03-18

## 2020-03-18 ENCOUNTER — APPOINTMENT (OUTPATIENT)
Dept: CARDIOLOGY | Facility: HOSPITAL | Age: 55
End: 2020-03-18

## 2020-03-18 ENCOUNTER — APPOINTMENT (OUTPATIENT)
Dept: CT IMAGING | Facility: HOSPITAL | Age: 55
End: 2020-03-18

## 2020-03-18 ENCOUNTER — OFFICE VISIT (OUTPATIENT)
Dept: FAMILY MEDICINE CLINIC | Facility: CLINIC | Age: 55
End: 2020-03-18

## 2020-03-18 VITALS
SYSTOLIC BLOOD PRESSURE: 128 MMHG | OXYGEN SATURATION: 99 % | WEIGHT: 210 LBS | HEART RATE: 73 BPM | RESPIRATION RATE: 16 BRPM | DIASTOLIC BLOOD PRESSURE: 84 MMHG | HEIGHT: 69 IN | TEMPERATURE: 97.5 F | BODY MASS INDEX: 31.1 KG/M2

## 2020-03-18 DIAGNOSIS — H53.2 DOUBLE VISION: ICD-10-CM

## 2020-03-18 DIAGNOSIS — Z12.11 SCREENING FOR COLON CANCER: ICD-10-CM

## 2020-03-18 DIAGNOSIS — G45.3 AMAUROSIS FUGAX OF RIGHT EYE: Primary | ICD-10-CM

## 2020-03-18 DIAGNOSIS — Z12.5 SCREENING FOR PROSTATE CANCER: ICD-10-CM

## 2020-03-18 DIAGNOSIS — Z80.0 FAMILY HISTORY OF COLON CANCER: ICD-10-CM

## 2020-03-18 DIAGNOSIS — H54.61 VISION LOSS, RIGHT EYE: ICD-10-CM

## 2020-03-18 DIAGNOSIS — G45.9 TIA (TRANSIENT ISCHEMIC ATTACK): ICD-10-CM

## 2020-03-18 DIAGNOSIS — Z13.1 ENCOUNTER FOR SCREENING EXAMINATION FOR IMPAIRED GLUCOSE REGULATION AND DIABETES MELLITUS: ICD-10-CM

## 2020-03-18 DIAGNOSIS — Z13.220 SCREENING FOR LIPOID DISORDERS: ICD-10-CM

## 2020-03-18 DIAGNOSIS — Z13.29 SCREENING FOR THYROID DISORDER: ICD-10-CM

## 2020-03-18 PROBLEM — T78.40XA ALLERGIES: Status: ACTIVE | Noted: 2020-03-18

## 2020-03-18 LAB
ALBUMIN SERPL-MCNC: 4.6 G/DL (ref 3.5–5.2)
ALBUMIN SERPL-MCNC: 5 G/DL (ref 3.5–5.2)
ALBUMIN/GLOB SERPL: 2.1 G/DL
ALBUMIN/GLOB SERPL: 2.2 G/DL
ALP SERPL-CCNC: 47 U/L (ref 39–117)
ALP SERPL-CCNC: 50 U/L (ref 39–117)
ALT SERPL W P-5'-P-CCNC: 34 U/L (ref 1–41)
ALT SERPL-CCNC: 35 U/L (ref 1–41)
ANION GAP SERPL CALCULATED.3IONS-SCNC: 11.1 MMOL/L (ref 5–15)
AORTIC DIMENSIONLESS INDEX: 0.8 (DI)
AST SERPL-CCNC: 19 U/L (ref 1–40)
AST SERPL-CCNC: 21 U/L (ref 1–40)
BASOPHILS # BLD AUTO: 0.03 10*3/MM3 (ref 0–0.2)
BASOPHILS # BLD AUTO: 0.04 10*3/MM3 (ref 0–0.2)
BASOPHILS NFR BLD AUTO: 0.6 % (ref 0–1.5)
BASOPHILS NFR BLD AUTO: 0.7 % (ref 0–1.5)
BH CV ECHO MEAS - ACS: 2.2 CM
BH CV ECHO MEAS - AO MAX PG: 6 MMHG
BH CV ECHO MEAS - AO MEAN PG (FULL): 1 MMHG
BH CV ECHO MEAS - AO MEAN PG: 3 MMHG
BH CV ECHO MEAS - AO ROOT AREA (BSA CORRECTED): 1.6
BH CV ECHO MEAS - AO ROOT AREA: 9.1 CM^2
BH CV ECHO MEAS - AO ROOT DIAM: 3.4 CM
BH CV ECHO MEAS - AO V2 MAX: 118 CM/SEC
BH CV ECHO MEAS - AO V2 MEAN: 77 CM/SEC
BH CV ECHO MEAS - AO V2 VTI: 26.6 CM
BH CV ECHO MEAS - AVA(I,A): 3.2 CM^2
BH CV ECHO MEAS - AVA(I,D): 3.2 CM^2
BH CV ECHO MEAS - BSA(HAYCOCK): 2.2 M^2
BH CV ECHO MEAS - BSA: 2.1 M^2
BH CV ECHO MEAS - BZI_BMI: 31 KILOGRAMS/M^2
BH CV ECHO MEAS - BZI_METRIC_HEIGHT: 175.3 CM
BH CV ECHO MEAS - BZI_METRIC_WEIGHT: 95.3 KG
BH CV ECHO MEAS - DESC AORTA: 1.5 CM
BH CV ECHO MEAS - EDV(CUBED): 110.6 ML
BH CV ECHO MEAS - EDV(MOD-SP2): 139 ML
BH CV ECHO MEAS - EDV(MOD-SP4): 115 ML
BH CV ECHO MEAS - EDV(TEICH): 107.5 ML
BH CV ECHO MEAS - EF(CUBED): 57.8 %
BH CV ECHO MEAS - EF(MOD-BP): 66 %
BH CV ECHO MEAS - EF(MOD-SP2): 66.9 %
BH CV ECHO MEAS - EF(MOD-SP4): 60.9 %
BH CV ECHO MEAS - EF(TEICH): 49.4 %
BH CV ECHO MEAS - ESV(CUBED): 46.7 ML
BH CV ECHO MEAS - ESV(MOD-SP2): 46 ML
BH CV ECHO MEAS - ESV(MOD-SP4): 45 ML
BH CV ECHO MEAS - ESV(TEICH): 54.4 ML
BH CV ECHO MEAS - FS: 25 %
BH CV ECHO MEAS - IVS/LVPW: 1
BH CV ECHO MEAS - IVSD: 0.9 CM
BH CV ECHO MEAS - LA DIMENSION: 3.9 CM
BH CV ECHO MEAS - LA/AO: 1.1
BH CV ECHO MEAS - LAT PEAK E' VEL: 11 CM/SEC
BH CV ECHO MEAS - LV DIASTOLIC VOL/BSA (35-75): 54.5 ML/M^2
BH CV ECHO MEAS - LV MASS(C)D: 147.8 GRAMS
BH CV ECHO MEAS - LV MASS(C)DI: 70.1 GRAMS/M^2
BH CV ECHO MEAS - LV MAX PG: 4 MMHG
BH CV ECHO MEAS - LV MEAN PG: 2 MMHG
BH CV ECHO MEAS - LV SYSTOLIC VOL/BSA (12-30): 21.3 ML/M^2
BH CV ECHO MEAS - LV V1 MAX: 99 CM/SEC
BH CV ECHO MEAS - LV V1 MEAN: 65.4 CM/SEC
BH CV ECHO MEAS - LV V1 VTI: 22.3 CM
BH CV ECHO MEAS - LVIDD: 4.8 CM
BH CV ECHO MEAS - LVIDS: 3.6 CM
BH CV ECHO MEAS - LVLD AP2: 8.5 CM
BH CV ECHO MEAS - LVLD AP4: 8.7 CM
BH CV ECHO MEAS - LVLS AP2: 7.1 CM
BH CV ECHO MEAS - LVLS AP4: 6.7 CM
BH CV ECHO MEAS - LVOT AREA (M): 3.8 CM^2
BH CV ECHO MEAS - LVOT AREA: 3.8 CM^2
BH CV ECHO MEAS - LVOT DIAM: 2.2 CM
BH CV ECHO MEAS - LVPWD: 0.9 CM
BH CV ECHO MEAS - MED PEAK E' VEL: 8 CM/SEC
BH CV ECHO MEAS - MV A DUR: 0.19 SEC
BH CV ECHO MEAS - MV A MAX VEL: 77.3 CM/SEC
BH CV ECHO MEAS - MV DEC SLOPE: 467 CM/SEC^2
BH CV ECHO MEAS - MV DEC TIME: 200 SEC
BH CV ECHO MEAS - MV E MAX VEL: 84.5 CM/SEC
BH CV ECHO MEAS - MV E/A: 1.1
BH CV ECHO MEAS - MV MAX PG: 3 MMHG
BH CV ECHO MEAS - MV MEAN PG: 1 MMHG
BH CV ECHO MEAS - MV P1/2T MAX VEL: 85.5 CM/SEC
BH CV ECHO MEAS - MV P1/2T: 53.6 MSEC
BH CV ECHO MEAS - MV V2 MEAN: 50.4 CM/SEC
BH CV ECHO MEAS - MV V2 VTI: 26.3 CM
BH CV ECHO MEAS - MVA P1/2T LCG: 2.6 CM^2
BH CV ECHO MEAS - MVA(P1/2T): 4.1 CM^2
BH CV ECHO MEAS - MVA(VTI): 3.2 CM^2
BH CV ECHO MEAS - PA ACC SLOPE: 1680 CM/SEC^2
BH CV ECHO MEAS - PA ACC TIME: 0.07 SEC
BH CV ECHO MEAS - PA MAX PG (FULL): 5 MMHG
BH CV ECHO MEAS - PA MAX PG: 5.9 MMHG
BH CV ECHO MEAS - PA PR(ACCEL): 47.5 MMHG
BH CV ECHO MEAS - PA V2 MAX: 121 CM/SEC
BH CV ECHO MEAS - PI END-D VEL: 111 CM/SEC
BH CV ECHO MEAS - PULM A REVS DUR: 0.18 SEC
BH CV ECHO MEAS - PULM A REVS VEL: 34.9 CM/SEC
BH CV ECHO MEAS - PULM DIAS VEL: 55.8 CM/SEC
BH CV ECHO MEAS - PULM S/D: 0.96
BH CV ECHO MEAS - PULM SYS VEL: 53.7 CM/SEC
BH CV ECHO MEAS - PVA(V,A): 1.9 CM^2
BH CV ECHO MEAS - PVA(V,D): 1.9 CM^2
BH CV ECHO MEAS - QP/QS: 0.75
BH CV ECHO MEAS - RAP SYSTOLE: 3 MMHG
BH CV ECHO MEAS - RV MAX PG: 0.87 MMHG
BH CV ECHO MEAS - RV MEAN PG: 0 MMHG
BH CV ECHO MEAS - RV V1 MAX: 46.6 CM/SEC
BH CV ECHO MEAS - RV V1 MEAN: 31.5 CM/SEC
BH CV ECHO MEAS - RV V1 VTI: 13 CM
BH CV ECHO MEAS - RVOT AREA: 4.9 CM^2
BH CV ECHO MEAS - RVOT DIAM: 2.5 CM
BH CV ECHO MEAS - RVSP: 20.5 MMHG
BH CV ECHO MEAS - SI(AO): 114.5 ML/M^2
BH CV ECHO MEAS - SI(CUBED): 30.3 ML/M^2
BH CV ECHO MEAS - SI(LVOT): 40.2 ML/M^2
BH CV ECHO MEAS - SI(MOD-SP2): 44.1 ML/M^2
BH CV ECHO MEAS - SI(MOD-SP4): 33.2 ML/M^2
BH CV ECHO MEAS - SI(TEICH): 25.2 ML/M^2
BH CV ECHO MEAS - SV(AO): 241.5 ML
BH CV ECHO MEAS - SV(CUBED): 63.9 ML
BH CV ECHO MEAS - SV(LVOT): 84.8 ML
BH CV ECHO MEAS - SV(MOD-SP2): 93 ML
BH CV ECHO MEAS - SV(MOD-SP4): 70 ML
BH CV ECHO MEAS - SV(RVOT): 63.8 ML
BH CV ECHO MEAS - SV(TEICH): 53.1 ML
BH CV ECHO MEAS - TAPSE (>1.6): 1.8 CM2
BH CV ECHO MEAS - TR MAX VEL: 209 CM/SEC
BH CV ECHO MEASUREMENTS AVERAGE E/E' RATIO: 8.89
BH CV VAS BP RIGHT ARM: NORMAL MMHG
BH CV XLRA - RV BASE: 3.2 CM
BH CV XLRA - RV LENGTH: 7.8 CM
BH CV XLRA - RV MID: 2.3 CM
BH CV XLRA - TDI S': 11 CM/SEC
BILIRUB SERPL-MCNC: 0.6 MG/DL (ref 0.2–1.2)
BILIRUB SERPL-MCNC: 0.6 MG/DL (ref 0.2–1.2)
BUN BLD-MCNC: 7 MG/DL (ref 6–20)
BUN SERPL-MCNC: 8 MG/DL (ref 6–20)
BUN/CREAT SERPL: 8 (ref 7–25)
BUN/CREAT SERPL: 8.4 (ref 7–25)
CALCIUM SERPL-MCNC: 9.1 MG/DL (ref 8.6–10.5)
CALCIUM SPEC-SCNC: 9 MG/DL (ref 8.6–10.5)
CHLORIDE SERPL-SCNC: 98 MMOL/L (ref 98–107)
CHLORIDE SERPL-SCNC: 99 MMOL/L (ref 98–107)
CHOLEST SERPL-MCNC: 188 MG/DL (ref 0–200)
CO2 SERPL-SCNC: 25.8 MMOL/L (ref 22–29)
CO2 SERPL-SCNC: 25.9 MMOL/L (ref 22–29)
CREAT BLD-MCNC: 0.87 MG/DL (ref 0.76–1.27)
CREAT SERPL-MCNC: 0.95 MG/DL (ref 0.76–1.27)
DEPRECATED RDW RBC AUTO: 41.4 FL (ref 37–54)
EOSINOPHIL # BLD AUTO: 0.06 10*3/MM3 (ref 0–0.4)
EOSINOPHIL # BLD AUTO: 0.09 10*3/MM3 (ref 0–0.4)
EOSINOPHIL NFR BLD AUTO: 1.2 % (ref 0.3–6.2)
EOSINOPHIL NFR BLD AUTO: 1.5 % (ref 0.3–6.2)
ERYTHROCYTE [DISTWIDTH] IN BLOOD BY AUTOMATED COUNT: 12.6 % (ref 12.3–15.4)
ERYTHROCYTE [DISTWIDTH] IN BLOOD BY AUTOMATED COUNT: 12.7 % (ref 12.3–15.4)
GFR SERPL CREATININE-BSD FRML MDRD: 91 ML/MIN/1.73
GLOBULIN SER CALC-MCNC: 2.2 GM/DL
GLOBULIN UR ELPH-MCNC: 2.3 GM/DL
GLUCOSE BLD-MCNC: 94 MG/DL (ref 65–99)
GLUCOSE BLDC GLUCOMTR-MCNC: 102 MG/DL (ref 70–130)
GLUCOSE SERPL-MCNC: 95 MG/DL (ref 65–99)
HBA1C MFR BLD: 7.1 % (ref 4.8–5.6)
HCT VFR BLD AUTO: 43.7 % (ref 37.5–51)
HCT VFR BLD AUTO: 44.3 % (ref 37.5–51)
HDLC SERPL-MCNC: 38 MG/DL (ref 40–60)
HGB BLD-MCNC: 15.6 G/DL (ref 13–17.7)
HGB BLD-MCNC: 15.6 G/DL (ref 13–17.7)
IMM GRANULOCYTES # BLD AUTO: 0.02 10*3/MM3 (ref 0–0.05)
IMM GRANULOCYTES # BLD AUTO: 0.02 10*3/MM3 (ref 0–0.05)
IMM GRANULOCYTES NFR BLD AUTO: 0.3 % (ref 0–0.5)
IMM GRANULOCYTES NFR BLD AUTO: 0.4 % (ref 0–0.5)
INR PPP: 1.02 (ref 0.9–1.1)
LDLC SERPL CALC-MCNC: 127 MG/DL (ref 0–100)
LDLC/HDLC SERPL: 3.34 {RATIO}
LEFT ATRIUM VOLUME INDEX: 23 ML/M2
LEFT ATRIUM VOLUME: 44 CM3
LYMPHOCYTES # BLD AUTO: 1.89 10*3/MM3 (ref 0.7–3.1)
LYMPHOCYTES # BLD AUTO: 2.26 10*3/MM3 (ref 0.7–3.1)
LYMPHOCYTES NFR BLD AUTO: 38.5 % (ref 19.6–45.3)
LYMPHOCYTES NFR BLD AUTO: 38.8 % (ref 19.6–45.3)
MAXIMAL PREDICTED HEART RATE: 166 BPM
MCH RBC QN AUTO: 31.8 PG (ref 26.6–33)
MCH RBC QN AUTO: 32.1 PG (ref 26.6–33)
MCHC RBC AUTO-ENTMCNC: 35.2 G/DL (ref 31.5–35.7)
MCHC RBC AUTO-ENTMCNC: 35.7 G/DL (ref 31.5–35.7)
MCV RBC AUTO: 89.9 FL (ref 79–97)
MCV RBC AUTO: 90.2 FL (ref 79–97)
MONOCYTES # BLD AUTO: 0.58 10*3/MM3 (ref 0.1–0.9)
MONOCYTES # BLD AUTO: 0.67 10*3/MM3 (ref 0.1–0.9)
MONOCYTES NFR BLD AUTO: 11.5 % (ref 5–12)
MONOCYTES NFR BLD AUTO: 11.8 % (ref 5–12)
NEUTROPHILS # BLD AUTO: 2.33 10*3/MM3 (ref 1.7–7)
NEUTROPHILS # BLD AUTO: 2.75 10*3/MM3 (ref 1.7–7)
NEUTROPHILS NFR BLD AUTO: 47.2 % (ref 42.7–76)
NEUTROPHILS NFR BLD AUTO: 47.5 % (ref 42.7–76)
NRBC BLD AUTO-RTO: 0 /100 WBC (ref 0–0.2)
NRBC BLD AUTO-RTO: 0.2 /100 WBC (ref 0–0.2)
PLATELET # BLD AUTO: 181 10*3/MM3 (ref 140–450)
PLATELET # BLD AUTO: 196 10*3/MM3 (ref 140–450)
PMV BLD AUTO: 9.3 FL (ref 6–12)
POTASSIUM BLD-SCNC: 4.1 MMOL/L (ref 3.5–5.2)
POTASSIUM SERPL-SCNC: 4.3 MMOL/L (ref 3.5–5.2)
PROT SERPL-MCNC: 6.8 G/DL (ref 6–8.5)
PROT SERPL-MCNC: 7.3 G/DL (ref 6–8.5)
PROTHROMBIN TIME: 13.1 SECONDS (ref 11.7–14.2)
PSA SERPL-MCNC: 1.55 NG/ML (ref 0–4)
RBC # BLD AUTO: 4.86 10*6/MM3 (ref 4.14–5.8)
RBC # BLD AUTO: 4.91 10*6/MM3 (ref 4.14–5.8)
SODIUM BLD-SCNC: 135 MMOL/L (ref 136–145)
SODIUM SERPL-SCNC: 137 MMOL/L (ref 136–145)
STRESS TARGET HR: 141 BPM
TRIGL SERPL-MCNC: 115 MG/DL (ref 0–150)
TROPONIN T SERPL-MCNC: <0.01 NG/ML (ref 0–0.03)
TSH SERPL DL<=0.005 MIU/L-ACNC: 2.48 UIU/ML (ref 0.27–4.2)
VLDLC SERPL CALC-MCNC: 23 MG/DL
WBC # BLD AUTO: 5.83 10*3/MM3 (ref 3.4–10.8)
WBC NRBC COR # BLD: 4.91 10*3/MM3 (ref 3.4–10.8)

## 2020-03-18 PROCEDURE — 70496 CT ANGIOGRAPHY HEAD: CPT

## 2020-03-18 PROCEDURE — 99204 OFFICE O/P NEW MOD 45 MIN: CPT | Performed by: PSYCHIATRY & NEUROLOGY

## 2020-03-18 PROCEDURE — 93010 ELECTROCARDIOGRAM REPORT: CPT | Performed by: INTERNAL MEDICINE

## 2020-03-18 PROCEDURE — 0 IOPAMIDOL PER 1 ML: Performed by: INTERNAL MEDICINE

## 2020-03-18 PROCEDURE — 84484 ASSAY OF TROPONIN QUANT: CPT | Performed by: NURSE PRACTITIONER

## 2020-03-18 PROCEDURE — 99284 EMERGENCY DEPT VISIT MOD MDM: CPT

## 2020-03-18 PROCEDURE — G0378 HOSPITAL OBSERVATION PER HR: HCPCS

## 2020-03-18 PROCEDURE — 99214 OFFICE O/P EST MOD 30 MIN: CPT | Performed by: NURSE PRACTITIONER

## 2020-03-18 PROCEDURE — 82962 GLUCOSE BLOOD TEST: CPT

## 2020-03-18 PROCEDURE — 93005 ELECTROCARDIOGRAM TRACING: CPT | Performed by: NURSE PRACTITIONER

## 2020-03-18 PROCEDURE — 80053 COMPREHEN METABOLIC PANEL: CPT | Performed by: NURSE PRACTITIONER

## 2020-03-18 PROCEDURE — 93306 TTE W/DOPPLER COMPLETE: CPT | Performed by: INTERNAL MEDICINE

## 2020-03-18 PROCEDURE — 70498 CT ANGIOGRAPHY NECK: CPT

## 2020-03-18 PROCEDURE — 93306 TTE W/DOPPLER COMPLETE: CPT

## 2020-03-18 PROCEDURE — 85610 PROTHROMBIN TIME: CPT | Performed by: NURSE PRACTITIONER

## 2020-03-18 PROCEDURE — 93000 ELECTROCARDIOGRAM COMPLETE: CPT | Performed by: NURSE PRACTITIONER

## 2020-03-18 PROCEDURE — 70551 MRI BRAIN STEM W/O DYE: CPT

## 2020-03-18 PROCEDURE — 85025 COMPLETE CBC W/AUTO DIFF WBC: CPT | Performed by: NURSE PRACTITIONER

## 2020-03-18 RX ORDER — CLOPIDOGREL BISULFATE 75 MG/1
300 TABLET ORAL ONCE
Status: COMPLETED | OUTPATIENT
Start: 2020-03-18 | End: 2020-03-18

## 2020-03-18 RX ORDER — ASPIRIN 325 MG
325 TABLET ORAL DAILY
Status: DISCONTINUED | OUTPATIENT
Start: 2020-03-18 | End: 2020-03-19 | Stop reason: HOSPADM

## 2020-03-18 RX ORDER — ASPIRIN 300 MG/1
300 SUPPOSITORY RECTAL DAILY
Status: DISCONTINUED | OUTPATIENT
Start: 2020-03-18 | End: 2020-03-19 | Stop reason: HOSPADM

## 2020-03-18 RX ORDER — CLOPIDOGREL BISULFATE 75 MG/1
75 TABLET ORAL DAILY
Status: DISCONTINUED | OUTPATIENT
Start: 2020-03-19 | End: 2020-03-19 | Stop reason: HOSPADM

## 2020-03-18 RX ORDER — ATORVASTATIN CALCIUM 80 MG/1
80 TABLET, FILM COATED ORAL NIGHTLY
Status: DISCONTINUED | OUTPATIENT
Start: 2020-03-18 | End: 2020-03-19 | Stop reason: HOSPADM

## 2020-03-18 RX ORDER — SODIUM CHLORIDE 0.9 % (FLUSH) 0.9 %
10 SYRINGE (ML) INJECTION AS NEEDED
Status: DISCONTINUED | OUTPATIENT
Start: 2020-03-18 | End: 2020-03-19 | Stop reason: HOSPADM

## 2020-03-18 RX ORDER — SODIUM CHLORIDE 0.9 % (FLUSH) 0.9 %
10 SYRINGE (ML) INJECTION EVERY 12 HOURS SCHEDULED
Status: DISCONTINUED | OUTPATIENT
Start: 2020-03-18 | End: 2020-03-19 | Stop reason: HOSPADM

## 2020-03-18 RX ADMIN — SODIUM CHLORIDE, PRESERVATIVE FREE 10 ML: 5 INJECTION INTRAVENOUS at 15:54

## 2020-03-18 RX ADMIN — ATORVASTATIN CALCIUM 80 MG: 80 TABLET, FILM COATED ORAL at 20:07

## 2020-03-18 RX ADMIN — IOPAMIDOL 95 ML: 755 INJECTION, SOLUTION INTRAVENOUS at 14:30

## 2020-03-18 RX ADMIN — CLOPIDOGREL 300 MG: 75 TABLET, FILM COATED ORAL at 17:46

## 2020-03-18 RX ADMIN — SODIUM CHLORIDE, PRESERVATIVE FREE 10 ML: 5 INJECTION INTRAVENOUS at 20:07

## 2020-03-18 RX ADMIN — ASPIRIN 325 MG: 325 TABLET ORAL at 17:46

## 2020-03-18 NOTE — H&P
Patient Name:  Ramirze Pena  YOB: 1965  MRN:  [id number]  Admit Date:  3/18/2020  Patient Care Team:  Carmina Awad APRN as PCP - General (Family Medicine)      Subjective   History Present Illness     Chief Complaint   Patient presents with   • Decreased Visual Acuity       Mr. Pena is a 54 y.o. male with a history of allergies, depression that presents to [facility] complaining of rt visual deficit. Pt reports on Monday, he had an acute onset of seeing a gray spot rt eye along w/blurry vision. He states the episode lasted ~ 10 minutes. He also noticed he was leaning while sitting upright. Denies associated HA, facial numbness/tingling, difficulty talking or swallowing, weakness of extremities, dizziness/lightheadedness. He saw an ophthalmologist on Monday, pt reports his exam was negative. He saw PCP today where he was referred for neurological workup. He reports his vision has returned to normal and denies any other neurological issues.     's systolic. Labs are unremarkable. No imaging performed at time of this H&P.     Review of Systems   Constitutional: Negative.    HENT: Positive for congestion.    Eyes: Negative for photophobia and visual disturbance.   Respiratory: Negative.    Cardiovascular: Negative.    Gastrointestinal: Negative.    Genitourinary: Negative.    Musculoskeletal: Negative.    Skin: Negative.    Neurological: Negative.    Psychiatric/Behavioral: Negative.         Personal History     Past Medical History:   Diagnosis Date   • Amaurosis fugax of right eye 3/18/2020   • Depression    • ROBYN on CPAP     AHI 12.5     Past Surgical History:   Procedure Laterality Date   • APPENDECTOMY Bilateral 2005   • COLONOSCOPY     • KNEE ACL RECONSTRUCTION Right 2011     Family History   Problem Relation Age of Onset   • Cancer Mother    • Heart disease Mother    • Hypertension Mother    • Cancer Father    • Cancer Maternal Aunt    • Cancer Maternal  Uncle    • Cancer Paternal Aunt    • Cancer Paternal Uncle    • Cancer Maternal Grandmother    • Cancer Maternal Grandfather    • Cancer Paternal Grandmother    • Cancer Paternal Grandfather      Social History     Tobacco Use   • Smoking status: Light Tobacco Smoker     Types: Cigars   • Smokeless tobacco: Never Used   Substance Use Topics   • Alcohol use: Yes     Comment: just drinks every once in a while    • Drug use: No     No current facility-administered medications on file prior to encounter.      Current Outpatient Medications on File Prior to Encounter   Medication Sig Dispense Refill   • amoxicillin-clavulanate (AUGMENTIN) 875-125 MG per tablet Take 1 tablet by mouth Every 12 (Twelve) Hours. 20 tablet 0   • azelastine (ASTELIN) 0.1 % nasal spray INHALE 2 SPRAYS IEN QHS  6   • fexofenadine-pseudoephedrine (ALLEGRA-D ALLERGY & CONGESTION) 180-240 MG per 24 hr tablet Take 1 tablet by mouth Daily. 30 tablet 5   • fluticasone (FLONASE) 50 MCG/ACT nasal spray 2 sprays into the nostril(s) as directed by provider Daily.     • montelukast (SINGULAIR) 10 MG tablet Take 1 tablet by mouth Every Night. 30 tablet 5   • predniSONE (DELTASONE) 20 MG tablet Take 1 tablet by mouth Daily. 5 tablet 0     No Known Allergies    Objective    Objective     Vital Signs  Temp:  [97.5 °F (36.4 °C)-97.9 °F (36.6 °C)] 97.9 °F (36.6 °C)  Heart Rate:  [69-82] 70  Resp:  [16-18] 18  BP: (128-145)/(84-96) 140/88  SpO2:  [97 %-100 %] 100 %  on   ;   Device (Oxygen Therapy): room air  Body mass index is 31.01 kg/m².    Physical Exam   Constitutional: He is oriented to person, place, and time. No distress.   HENT:   Head: Normocephalic.   Eyes: EOM are normal.   Neck: No JVD present.   Cardiovascular: Normal rate and regular rhythm.   Pulmonary/Chest: Effort normal and breath sounds normal. No respiratory distress.   Abdominal: Soft. Bowel sounds are normal.   Musculoskeletal: He exhibits no edema.   Strength 5/5 throughout   Neurological:  He is alert and oriented to person, place, and time. No cranial nerve deficit.   Skin: Skin is warm and dry.   Psychiatric: He has a normal mood and affect.   Vitals reviewed.      Results Review:  I reviewed the patient's new clinical results.  I reviewed the patient's new imaging results and agree with the interpretation.  I reviewed the patient's other test results and agree with the interpretation  I personally viewed and interpreted the patient's EKG/Telemetry data  Discussed with ED provider.    Lab Results (last 24 hours)     Procedure Component Value Units Date/Time    CBC & Differential [[id number]] Collected:  03/18/20 1110    Specimen:  Blood Updated:  03/18/20 1125    Narrative:       The following orders were created for panel order CBC & Differential.  Procedure                               Abnormality         Status                     ---------                               -----------         ------                     CBC Auto Differential[[id number]]        Normal              Final result                 Please view results for these tests on the individual orders.    Comprehensive Metabolic Panel [[id number]]  (Abnormal) Collected:  03/18/20 1110    Specimen:  Blood Updated:  03/18/20 1145     Glucose 94 mg/dL      BUN 7 mg/dL      Creatinine 0.87 mg/dL      Sodium 135 mmol/L      Potassium 4.1 mmol/L      Chloride 98 mmol/L      CO2 25.9 mmol/L      Calcium 9.0 mg/dL      Total Protein 7.3 g/dL      Albumin 5.00 g/dL      ALT (SGPT) 34 U/L      AST (SGOT) 19 U/L      Alkaline Phosphatase 47 U/L      Total Bilirubin 0.6 mg/dL      eGFR Non African Amer 91 mL/min/1.73      Globulin 2.3 gm/dL      A/G Ratio 2.2 g/dL      BUN/Creatinine Ratio 8.0     Anion Gap 11.1 mmol/L     Narrative:       GFR Normal >60  Chronic Kidney Disease <60  Kidney Failure <15      Protime-INR [[id number]]  (Normal) Collected:  03/18/20 1110    Specimen:  Blood Updated:  03/18/20 1133     Protime 13.1 Seconds      INR 1.02      Troponin [[id number]]  (Normal) Collected:  03/18/20 1110    Specimen:  Blood Updated:  03/18/20 1145     Troponin T <0.010 ng/mL     Narrative:       Troponin T Reference Range:  <= 0.03 ng/mL-   Negative for AMI  >0.03 ng/mL-     Abnormal for myocardial necrosis.  Clinicians would have to utilize clinical acumen, EKG, Troponin and serial changes to determine if it is an Acute Myocardial Infarction or myocardial injury due to an underlying chronic condition.       Results may be falsely decreased if patient taking Biotin.      CBC Auto Differential [[id number]]  (Normal) Collected:  03/18/20 1110    Specimen:  Blood Updated:  03/18/20 1125     WBC 4.91 10*3/mm3      RBC 4.91 10*6/mm3      Hemoglobin 15.6 g/dL      Hematocrit 44.3 %      MCV 90.2 fL      MCH 31.8 pg      MCHC 35.2 g/dL      RDW 12.7 %      RDW-SD 41.4 fl      MPV 9.3 fL      Platelets 181 10*3/mm3      Neutrophil % 47.5 %      Lymphocyte % 38.5 %      Monocyte % 11.8 %      Eosinophil % 1.2 %      Basophil % 0.6 %      Immature Grans % 0.4 %      Neutrophils, Absolute 2.33 10*3/mm3      Lymphocytes, Absolute 1.89 10*3/mm3      Monocytes, Absolute 0.58 10*3/mm3      Eosinophils, Absolute 0.06 10*3/mm3      Basophils, Absolute 0.03 10*3/mm3      Immature Grans, Absolute 0.02 10*3/mm3      nRBC 0.0 /100 WBC           Imaging Results (Last 24 Hours)     Procedure Component Value Units Date/Time    CT Angiogram Neck [[id number]] Resulted:  03/18/20 1335     Updated:  03/18/20 1335    CT Angiogram Head [[id number]] Resulted:  03/18/20 1335     Updated:  03/18/20 1335               ECG 12 Lead   Preliminary Result   HEART RATE= 69  bpm   RR Interval= 864  ms   MO Interval= 192  ms   P Horizontal Axis= 17  deg   P Front Axis= 22  deg   QRSD Interval= 98  ms   QT Interval= 391  ms   QRS Axis= -11  deg   T Wave Axis= 61  deg   - NORMAL ECG -   Sinus rhythm   Electronically Signed By:    Date and Time of Study: 2020-03-18 11:12:51           Assessment/Plan      Active Hospital Problems    Diagnosis  POA   • **Amaurosis fugax of right eye [G45.3]  Yes   • Transient ischemic attack (TIA) [G45.9]  Unknown   • Allergies [T78.40XA]  Unknown      Resolved Hospital Problems   No resolved problems to display.       Mr. Pena is a 54 y.o. male with a history of allergies, depression who is admitted for rt eye visual deficit, TIA    Amaurosis fugax rt eye/TIA:  -Neurology consult/workup  -ASA/Statin  -A1C, Lipid panel    Allergies/Chronic sinusitis:  -Continue home meds    Home meds to be reconciled when available    · I discussed the patient's findings and my recommendations with patient and Dr. Aquino.    VTE Prophylaxis - SCDs.  Code Status - Full code.       IVETTE Jo  [facility]  03/18/20  13:38

## 2020-03-18 NOTE — ED PROVIDER NOTES
EMERGENCY DEPARTMENT ENCOUNTER    CHIEF COMPLAINT  Chief Complaint: vision problem  History given by:patient  History limited by:nothing  Time Seen: 1020  Room Number: 01/01  PMD: Carmina Awad APRN Dr Conrad, Ophthalmology      HPI:  Pt is a 54 y.o. male who presents with after an episode of right eye vision loss that occurred two days ago. Patient lost vision in his right eye for 20 minutes while sitting at his desk -- his vision 'went gray.' He admits to sitting in a 'funny position' in his chair prior to the episode. He denies speech problem, headache, unilateral weakness or LOC during the episode. He went to Ophthalmology yesterday and was given an aspirin and instructed to follow-up with Family Medicine. He went to his PCP today (IVETTE Awad) and was sent to the ED after labs were drawn. Patient wears contacts. No other complaints.     Past Medical History of ROBYN on CPAP.    Duration: two days  Timing:episode  Location:right eye  Radiation:n/a  Quality:blurred vision  Intensity/Severity:mild  Progression:unchanged  Associated Symptoms:none  Aggravating Factors:none  Alleviating Factors:none  Previous Episodes:none  Treatment before arrival:ASA    PAST MEDICAL HISTORY  Active Ambulatory Problems     Diagnosis Date Noted   • Congested 03/20/2017   • Sore throat 03/20/2017   • Rash lower extremities 03/20/2017   • Postviral fatigue syndrome 03/20/2017   • Malaise 03/20/2017   • Congestion of respiratory tract 03/20/2017   • Pansinusitis 03/20/2017   • ROBYN on CPAP 10/02/2018     Resolved Ambulatory Problems     Diagnosis Date Noted   • Snoring 10/02/2018     Past Medical History:   Diagnosis Date   • Depression        PAST SURGICAL HISTORY  Past Surgical History:   Procedure Laterality Date   • APPENDECTOMY Bilateral 2005   • COLONOSCOPY     • KNEE ACL RECONSTRUCTION Right 2011       FAMILY HISTORY  Family History   Problem Relation Age of Onset   • Cancer Mother    • Heart disease Mother    •  Hypertension Mother    • Cancer Father    • Cancer Maternal Aunt    • Cancer Maternal Uncle    • Cancer Paternal Aunt    • Cancer Paternal Uncle    • Cancer Maternal Grandmother    • Cancer Maternal Grandfather    • Cancer Paternal Grandmother    • Cancer Paternal Grandfather        SOCIAL HISTORY  Social History     Socioeconomic History   • Marital status:      Spouse name: Not on file   • Number of children: Not on file   • Years of education: Not on file   • Highest education level: Not on file   Tobacco Use   • Smoking status: Light Tobacco Smoker     Types: Cigars   • Smokeless tobacco: Never Used   Substance and Sexual Activity   • Alcohol use: Yes     Comment: just drinks every once in a while    • Drug use: No         ALLERGIES  Patient has no known allergies.    REVIEW OF SYSTEMS  Review of Systems   Constitutional: Negative for chills and fever.   HENT: Negative for sore throat.    Eyes: Positive for visual disturbance (right eye vision loss for 20 minutes, described as 'gray').   Gastrointestinal: Negative for nausea and vomiting.   Genitourinary: Negative for dysuria.   Musculoskeletal: Negative for back pain.   Skin: Negative for rash.   Psychiatric/Behavioral: The patient is not nervous/anxious.        PHYSICAL EXAM  ED Triage Vitals   Temp Heart Rate Resp BP SpO2   03/18/20 1003 03/18/20 1003 03/18/20 1003 03/18/20 1021 03/18/20 1003   97.9 °F (36.6 °C) 82 16 145/96 97 %         Physical Exam   Constitutional: He is oriented to person, place, and time and well-developed, well-nourished, and in no distress. No distress.   NAD   HENT:   Head: Atraumatic.   Mouth/Throat: Mucous membranes are normal. No oropharyngeal exudate.   Tongue is midline.   Eyes: Pupils are equal, round, and reactive to light. EOM are normal. No scleral icterus.   Pupils are equal, round and reactive to light. Extra-ocular movements are intact. No scleral icterus.   Neck: Normal range of motion. Neck supple.    Cardiovascular: Normal rate, regular rhythm and normal heart sounds.   No murmur heard.  RRR. No obvious murmur.   Pulmonary/Chest: Effort normal and breath sounds normal. No respiratory distress.   CTAB. No respiratory distress.   Musculoskeletal: Normal range of motion.   Neurological: He is alert and oriented to person, place, and time. He has normal sensation and normal strength. He has a normal Finger-Nose-Finger Test. He shows no pronator drift.   Normal sensation. Normal FNF. No pronator drift. No facial droop.   Skin: Skin is warm and dry. No pallor.   Psychiatric: Mood and affect normal.   Nursing note and vitals reviewed.      Interval: baseline  1a. Level of Consciousness: 0-->Alert, keenly responsive  1b. LOC Questions: 0-->Answers both questions correctly  1c. LOC Commands: 0-->Performs both tasks correctly  2. Best Gaze: 0-->Normal  3. Visual: 0-->No visual loss  4. Facial Palsy: 0-->Normal symmetrical movements  5a. Motor Arm, Left: 0-->No drift, limb holds 90 (or 45) degrees for full 10 secs  5b. Motor Arm, Right: 0-->No drift, limb holds 90 (or 45) degrees for full 10 secs  6a. Motor Leg, Left: 0-->No drift, leg holds 30 degree position for full 5 secs  6b. Motor Leg, Right: 0-->No drift, leg holds 30 degree position for full 5 secs  7. Limb Ataxia: 0-->Absent  8. Sensory: 0-->Normal, no sensory loss  9. Best Language: 0-->No aphasia, normal  10. Dysarthria: 0-->Normal  11. Extinction and Inattention (formerly Neglect): 0-->No abnormality    Total (NIH Stroke Scale): 0      LAB RESULTS  Recent Results (from the past 24 hour(s))   Comprehensive Metabolic Panel    Collection Time: 03/18/20 11:10 AM   Result Value Ref Range    Glucose 94 65 - 99 mg/dL    BUN 7 6 - 20 mg/dL    Creatinine 0.87 0.76 - 1.27 mg/dL    Sodium 135 (L) 136 - 145 mmol/L    Potassium 4.1 3.5 - 5.2 mmol/L    Chloride 98 98 - 107 mmol/L    CO2 25.9 22.0 - 29.0 mmol/L    Calcium 9.0 8.6 - 10.5 mg/dL    Total Protein 7.3 6.0 - 8.5  g/dL    Albumin 5.00 3.50 - 5.20 g/dL    ALT (SGPT) 34 1 - 41 U/L    AST (SGOT) 19 1 - 40 U/L    Alkaline Phosphatase 47 39 - 117 U/L    Total Bilirubin 0.6 0.2 - 1.2 mg/dL    eGFR Non African Amer 91 >60 mL/min/1.73    Globulin 2.3 gm/dL    A/G Ratio 2.2 g/dL    BUN/Creatinine Ratio 8.0 7.0 - 25.0    Anion Gap 11.1 5.0 - 15.0 mmol/L   Protime-INR    Collection Time: 03/18/20 11:10 AM   Result Value Ref Range    Protime 13.1 11.7 - 14.2 Seconds    INR 1.02 0.90 - 1.10   Troponin    Collection Time: 03/18/20 11:10 AM   Result Value Ref Range    Troponin T <0.010 0.000 - 0.030 ng/mL   CBC Auto Differential    Collection Time: 03/18/20 11:10 AM   Result Value Ref Range    WBC 4.91 3.40 - 10.80 10*3/mm3    RBC 4.91 4.14 - 5.80 10*6/mm3    Hemoglobin 15.6 13.0 - 17.7 g/dL    Hematocrit 44.3 37.5 - 51.0 %    MCV 90.2 79.0 - 97.0 fL    MCH 31.8 26.6 - 33.0 pg    MCHC 35.2 31.5 - 35.7 g/dL    RDW 12.7 12.3 - 15.4 %    RDW-SD 41.4 37.0 - 54.0 fl    MPV 9.3 6.0 - 12.0 fL    Platelets 181 140 - 450 10*3/mm3    Neutrophil % 47.5 42.7 - 76.0 %    Lymphocyte % 38.5 19.6 - 45.3 %    Monocyte % 11.8 5.0 - 12.0 %    Eosinophil % 1.2 0.3 - 6.2 %    Basophil % 0.6 0.0 - 1.5 %    Immature Grans % 0.4 0.0 - 0.5 %    Neutrophils, Absolute 2.33 1.70 - 7.00 10*3/mm3    Lymphocytes, Absolute 1.89 0.70 - 3.10 10*3/mm3    Monocytes, Absolute 0.58 0.10 - 0.90 10*3/mm3    Eosinophils, Absolute 0.06 0.00 - 0.40 10*3/mm3    Basophils, Absolute 0.03 0.00 - 0.20 10*3/mm3    Immature Grans, Absolute 0.02 0.00 - 0.05 10*3/mm3    nRBC 0.0 0.0 - 0.2 /100 WBC       I ordered the above labs and reviewed the results.    EKG    EKG was interpreted by Dr. Pepito Cortes, see Dr. Cortes's note for interpretation.      MEDICAL RECORD REVIEW  IVETTE Awad's note from this morning 3/18/2020:  Will refer to neurology urgently for further evaluation. Ophthalmologist recommended echo and carotid screening, will hold, consider CT, will defer further work up to  neurology  Will obtain labs and ekg today, last labs 2017, no ekg on file, although denies palpitations or chest pain. For acute changes, recommended ER follow up given symptoms  Denies family hx of CVA or MI    Unable to get in to neurology urgently, will refer to ER at their direction for further imaging  No symptoms currently  Patient left ambulatory, in stable condition.   Diagnosed with amaurosis fugax of right eye      PROGRESS AND CONSULTS  0945. EKG ordered.     1032. Reviewed pt's history and workup with Dr Pepito Cortes.  At bedside evaluation, he agrees with the plan of care.    1037. Call out to Inpatient Neurology.     1044. Spoke with Dr Champagne, Neurology. He suggests admission for TIA workup but due to increasing corona-virus concern, patient given option for admission versus getting CT head/neck in ED and follow-up for additional testing.    1047. Rechecked patient, vitals stable. Informed patient of consult with Dr Champagne. Discussed plan to admit for the full workup but offered the patient discharge with outpatient follow-up. Patient would like to be admitted. Pt understands and agrees with the plan, all questions answered.     1052. Call out to A for admission. Ordered labs and EKG.     1153. Spoke with Dr Aquino, [facility], who agrees to admit the patient.     --  ADMISSION    Discussed treatment plan and reason for admission with pt/family and admitting physician.  Pt/family voiced understanding of the plan for admission for further testing/treatment as needed.      DIAGNOSIS  Final diagnoses:   Amaurosis fugax of right eye   TIA (transient ischemic attack)         COURSE & MEDICAL DECISION MAKING  Pertinent Labs and Imaging studies that were ordered and reviewed are noted above.  Results were reviewed/discussed with the patient and they were also made aware of online access.   Pt also made aware that some labs, such as cultures, will not be resulted during ER visit and follow up with PMD is  "necessary.     MEDICATIONS GIVEN IN ER  Medications   sodium chloride 0.9 % flush 10 mL (has no administration in time range)   sodium chloride 0.9 % flush 10 mL (has no administration in time range)   atorvastatin (LIPITOR) tablet 80 mg (has no administration in time range)   aspirin tablet 325 mg (has no administration in time range)     Or   aspirin suppository 300 mg (has no administration in time range)       /96 (BP Location: Right arm, Patient Position: Sitting)   Pulse 69   Temp 97.9 °F (36.6 °C) (Tympanic)   Resp 18   Ht 175.3 cm (69\")   Wt 95.3 kg (210 lb)   SpO2 98%   BMI 31.01 kg/m²       I personally reviewed the past medical history, past surgical history, social history, family history, current medications and allergies as they appear in this chart.  The scribe's note accurately reflects the work and decisions made by me.     Documentation assistance provided by john Olivier for PARTH Rowan on 3/18/2020 at 11:55. Information recorded by the scribe was done at my direction and has been verified and validated by me.     Meagan Olivier  03/18/20 [id number]       Irais Guevara APRN  03/18/20 1300    "

## 2020-03-18 NOTE — ED PROVIDER NOTES
MD ATTESTATION NOTE    The KAITLIN and I have discussed this patient's history, physical exam, and treatment plan. I have reviewed the documentation and personally had a face to face interaction with the patient. I affirm the KAIT[facility] documentation and agree with their diagnostics, findings, treatment, plan, and disposition.  The attached note describes my personal findings.    Ramirez Pena is a 54 y.o. male who presents to the ED c/o an episode of right eye vision loss that occurred 2 days ago.  Patient states his vision went gray and that lasted for approximately 15 to 20 minutes.  Afterwards, vision returned to normal has been normal since.  Patient denies any associated eye pain, no headache.  Patient denies any facial pain, confusion, double vision, difficulty moving his face, difficulty with speech, difficulty swallowing, ringing in his ears, hearing loss, difficulty with coordination, weakness or paresthesias.  Patient was evaluated by his ophthalmologist afterwards and exam was reportedly unremarkable.  Patient was instructed to follow-up with primary care who referred him to the emergency department to be evaluated by neurology.  Patient does wear contact lenses.  Patient has no complaints at present.    On exam:  General: NAD  Head: NCAT  ENT: Extraocular motion intact, pupils equal and round reactive to light, moist mucous membranes  Neck: Supple, trachea midline  Cardiac, regular rate and rhythm  Lungs: Clear to auscultation bilaterally  Abdomen: Soft, nontender, no rebound tenderness/guarding/rigidity  Extremities: Moves all extremities well, no peripheral edema  Neuro: Alert and oriented x3, extraocular motion intact, pupils are equal and round reactive to light, cranial nerves II through XII are grossly intact, normal speech, moves all extremities well, 5 out of 5 strength all 4 extremities, sensation intact light touch all 4 extremities, no ataxia  Skin: Warm, dry    Labs  Recent Results (from the past 24  hour(s))   Comprehensive Metabolic Panel    Collection Time: 03/18/20 11:10 AM   Result Value Ref Range    Glucose 94 65 - 99 mg/dL    BUN 7 6 - 20 mg/dL    Creatinine 0.87 0.76 - 1.27 mg/dL    Sodium 135 (L) 136 - 145 mmol/L    Potassium 4.1 3.5 - 5.2 mmol/L    Chloride 98 98 - 107 mmol/L    CO2 25.9 22.0 - 29.0 mmol/L    Calcium 9.0 8.6 - 10.5 mg/dL    Total Protein 7.3 6.0 - 8.5 g/dL    Albumin 5.00 3.50 - 5.20 g/dL    ALT (SGPT) 34 1 - 41 U/L    AST (SGOT) 19 1 - 40 U/L    Alkaline Phosphatase 47 39 - 117 U/L    Total Bilirubin 0.6 0.2 - 1.2 mg/dL    eGFR Non African Amer 91 >60 mL/min/1.73    Globulin 2.3 gm/dL    A/G Ratio 2.2 g/dL    BUN/Creatinine Ratio 8.0 7.0 - 25.0    Anion Gap 11.1 5.0 - 15.0 mmol/L   Protime-INR    Collection Time: 03/18/20 11:10 AM   Result Value Ref Range    Protime 13.1 11.7 - 14.2 Seconds    INR 1.02 0.90 - 1.10   Troponin    Collection Time: 03/18/20 11:10 AM   Result Value Ref Range    Troponin T <0.010 0.000 - 0.030 ng/mL   CBC Auto Differential    Collection Time: 03/18/20 11:10 AM   Result Value Ref Range    WBC 4.91 3.40 - 10.80 10*3/mm3    RBC 4.91 4.14 - 5.80 10*6/mm3    Hemoglobin 15.6 13.0 - 17.7 g/dL    Hematocrit 44.3 37.5 - 51.0 %    MCV 90.2 79.0 - 97.0 fL    MCH 31.8 26.6 - 33.0 pg    MCHC 35.2 31.5 - 35.7 g/dL    RDW 12.7 12.3 - 15.4 %    RDW-SD 41.4 37.0 - 54.0 fl    MPV 9.3 6.0 - 12.0 fL    Platelets 181 140 - 450 10*3/mm3    Neutrophil % 47.5 42.7 - 76.0 %    Lymphocyte % 38.5 19.6 - 45.3 %    Monocyte % 11.8 5.0 - 12.0 %    Eosinophil % 1.2 0.3 - 6.2 %    Basophil % 0.6 0.0 - 1.5 %    Immature Grans % 0.4 0.0 - 0.5 %    Neutrophils, Absolute 2.33 1.70 - 7.00 10*3/mm3    Lymphocytes, Absolute 1.89 0.70 - 3.10 10*3/mm3    Monocytes, Absolute 0.58 0.10 - 0.90 10*3/mm3    Eosinophils, Absolute 0.06 0.00 - 0.40 10*3/mm3    Basophils, Absolute 0.03 0.00 - 0.20 10*3/mm3    Immature Grans, Absolute 0.02 0.00 - 0.05 10*3/mm3    nRBC 0.0 0.0 - 0.2 /100 WBC   Adult  Transthoracic Echo Complete W/ Cont if Necessary Per Protocol (With Agitated Saline)    Collection Time: 03/18/20  3:09 PM   Result Value Ref Range    BSA 2.1 m^2    IVSd 0.9 cm    LVIDd 4.8 cm    LVIDs 3.6 cm    LVPWd 0.9 cm    IVS/LVPW 1.0     FS 25.0 %    EDV(Teich) 107.5 ml    ESV(Teich) 54.4 ml    EF(Teich) 49.4 %    EDV(cubed) 110.6 ml    ESV(cubed) 46.7 ml    EF(cubed) 57.8 %    LV mass(C)d 147.8 grams    LV mass(C)dI 70.1 grams/m^2    SV(Teich) 53.1 ml    SI(Teich) 25.2 ml/m^2    SV(cubed) 63.9 ml    SI(cubed) 30.3 ml/m^2    Ao root diam 3.4 cm    Ao root area 9.1 cm^2    ACS 2.2 cm    LA dimension 3.9 cm    desc Ao Diam 1.5 cm    LA/Ao 1.1     LVOT diam 2.2 cm    LVOT area 3.8 cm^2    LVOT area(traced) 3.8 cm^2    RVOT diam 2.5 cm    RVOT area 4.9 cm^2    LVLd ap4 8.7 cm    EDV(MOD-sp4) 115.0 ml    LVLs ap4 6.7 cm    ESV(MOD-sp4) 45.0 ml    EF(MOD-sp4) 60.9 %    LVLd ap2 8.5 cm    EDV(MOD-sp2) 139.0 ml    LVLs ap2 7.1 cm    ESV(MOD-sp2) 46.0 ml    EF(MOD-sp2) 66.9 %    SV(MOD-sp4) 70.0 ml    SI(MOD-sp4) 33.2 ml/m^2    SV(MOD-sp2) 93.0 ml    SI(MOD-sp2) 44.1 ml/m^2    Ao root area (BSA corrected) 1.6     LV Judge Vol (BSA corrected) 54.5 ml/m^2    LV Sys Vol (BSA corrected) 21.3 ml/m^2    MV A dur 0.19 sec    MV E max primo 84.5 cm/sec    MV A max primo 77.3 cm/sec    MV E/A 1.1     MV V2 mean 50.4 cm/sec    MV mean PG 1.0 mmHg    MV V2 VTI 26.3 cm    MVA(VTI) 3.2 cm^2    MV P1/2t max primo 85.5 cm/sec    MV P1/2t 53.6 msec    MVA(P1/2t) 4.1 cm^2    MV dec slope 467.0 cm/sec^2    MV dec time 200 sec    Ao V2 mean 77.0 cm/sec    Ao mean PG 3.0 mmHg    Ao mean PG (full) 1.0 mmHg    Ao V2 VTI 26.6 cm    ESAU(I,A) 3.2 cm^2    ESAU(I,D) 3.2 cm^2    LV V1 mean PG 2.0 mmHg    LV V1 mean 65.4 cm/sec    LV V1 VTI 22.3 cm    SV(Ao) 241.5 ml    SI(Ao) 114.5 ml/m^2    SV(LVOT) 84.8 ml    SV(RVOT) 63.8 ml    SI(LVOT) 40.2 ml/m^2    PA V2 max 121.0 cm/sec    PA max PG 5.9 mmHg    PA max PG (full) 5.0 mmHg    BH CV ECHO RACHELE -  PVA(V,A) 1.9 cm^2     CV ECHO RACHELE - PVA(V,D) 1.9 cm^2    PA acc slope 1,680 cm/sec^2    PA acc time 0.07 sec    PI end-d kalyan 111.0 cm/sec    RV V1 max PG 0.87 mmHg    RV V1 mean PG 0 mmHg    RV V1 max 46.6 cm/sec    RV V1 mean 31.5 cm/sec    RV V1 VTI 13.0 cm    TR max kalyan 209.0 cm/sec    RVSP(TR) 20.5 mmHg    RAP systole 3.0 mmHg    PA pr(Accel) 47.5 mmHg    Pulm Sys Kalyan 53.7 cm/sec    Pulm Judge Kalyan 55.8 cm/sec    Pulm S/D 0.96     Qp/Qs 0.75     Pulm A Revs Dur 0.18 sec    Pulm A Revs Kalyan 34.9 cm/sec    MVA P1/2T LCG 2.6 cm^2     CV ECHO RACHELE - BZI_BMI 31.0 kilograms/m^2     CV ECHO RACHELE - BSA(HAYCOCK) 2.2 m^2     CV ECHO RACHELE - BZI_METRIC_WEIGHT 95.3 kg     CV ECHO RACHELE - BZI_METRIC_HEIGHT 175.3 cm    Target HR (85%) 141 bpm    Max. Pred. HR (100%) 166 bpm     CV VAS BP RIGHT /88 mmHg    TDI S' 11.00 cm/sec    RV Base 3.20 cm    RV Length 7.80 cm    RV Mid 2.30 cm    LA volume 44.0 cm3    Dimensionless Index 0.8 (DI)    LA Volume Index 23.0 mL/m2    Avg E/e' ratio 8.89     Ao pk kalyan 118.0 cm/sec    EF(MOD-bp) 66.0 %    Lat Peak E' Kalyan 11.0 cm/sec    LV V1 max PG 4.0 mmHg    LV V1 max 99.0 cm/sec    Med Peak E' Kalyan 8.00 cm/sec    Ao max PG 6.0 mmHg    MV max PG 3 mmHg    TAPSE (>1.6) 1.80 cm2       Radiology  Ct Angiogram Neck    Result Date: 3/18/2020  CONTRAST-ENHANCED CT ANGIOGRAM OF THE HEAD AND NECK 03/18/2020  CLINICAL HISTORY: Stroke, vision change, vision loss in right eye  TECHNIQUE: Spiral CT images were obtained from the base of the skull to the vertex both pre and post intravenous contrast images were reformatted and submitted in 3 mm thick axial CT section with brain algorithm and 2 mm thick sagittal and coronal reconstructions were performed and submitted in brain algorithm. Additional spiral CT images were obtained from the top of the aortic arch up through the great vessels of the head and neck during arterial phase of contrast and images were reformatted and submitted in 1 mm  thick axial sagittal and coronal CT sections and additional 3D reconstructions were performed to complete the CT angiogram of the head and neck.  FINDINGS: HEAD CT: The brain parenchyma is normal in attenuation. The ventricles are normal in size. I see no focal mass effect and no midline shift and no extra-axial fluid collections are identified and there is no evidence of acute intracranial hemorrhage. No abnormal areas of enhancement are seen in the head. The paranasal sinuses and mastoid air cells and middle ear cavities are clear. The calvarium and skull base are normal in appearance.  CT ANGIOGRAM OF THE NECK: The nasopharynx, oropharynx, hypopharynx, true cords and subglottic airway are normal in appearance. Thyroid gland enhances homogeneously and is normal in appearance. The lung apices are clear. The parotid, , parapharyngeal and submandibular spaces are symmetric and are normal in appearance. There is anatomic origin of the great vessels off the aortic arch. The left subclavian artery origin is normal in appearance. No stenosis is seen in the left subclavian artery. There is mixed calcified and noncalcified plaque that mildly narrows the origin of the left vertebral artery beyond its origin. The left vertebral artery is widely patent without stenosis to the vertebrobasilar junction. The left common carotid origin is normal in appearance. No stenosis is seen in the left common carotid artery. There is mixed calcified and noncalcified plaque involving posterior lateral wall. The left common carotid bifurcation extends into the origin of the left internal carotid artery but there is no stenosis in the left internal carotid artery using the NASCET criteria. The brachiocephalic artery origin is normal in appearance and no stenosis seen in the brachycephalic artery and its bifurcation into the right subclavian and common carotid arteries is normal in appearance. No stenosis is seen in the right  subclavian artery. The right vertebral artery origin is normal in appearance and no stenosis is seen in the right vertebral artery from its origin to the vertebrobasilar junction. The right common carotid origin is normal in appearance and no stenosis is seen in the right common carotid artery. There is mixed calcified and noncalcified plaque involving the posterior wall of the right common carotid bifurcation extending into the posterior aspect of the origin of the right internal carotid artery. It is best seen on sagittal reconstructed images 64 through 68. It is a very focal 1 mm long weblike stenosis at the origin of the right internal carotid artery that measures up to 50% using the NASCET criteria. No additional stenosis is seen in the right internal carotid artery.  CT ANGIOGRAM OF THE HEAD: CT angiographic images of the head demonstrate normal appearance of the intracranial segments of the distal vertebral arteries and normal appearance of the basilar artery and basilar tip as well as the posterior cerebral and superior cerebellar arteries bilaterally. The upper cervical, petrous, cavernous and supracavernous segments of the internal carotid arteries are normal in appearance. The anterior and middle cerebral arteries are normal in appearance. The anterior communicating artery origin and middle cerebral artery trifurcations are normal in appearance.      1. Calcified plaque mildly narrows the left vertebral artery origin. 2. Mixed calcified and noncalcified plaque extends from the posterior wall of the right common carotid artery into the posterior aspect of the origin of the right internal carotid artery and very focally results in a 1 mm long very focal up to 50% stenosis of the origin of the right internal carotid artery using the NASCET criteria. 3. There is mild cervical spondylosis, posterior endplate spurs and uncovertebral joint hypertrophy contributing to mild canal and mild right and moderate left  foraminal narrowing at C5-6 and up to mild-to-moderate canal and moderate to severe left foraminal narrowing at C6-7. The remainder of the CT angiogram of the head and neck is normal.    Radiation dose reduction techniques were utilized, including automated exposure control and exposure modulation based on body size.       Ct Angiogram Head    Result Date: 3/18/2020  CONTRAST-ENHANCED CT ANGIOGRAM OF THE HEAD AND NECK 03/18/2020  CLINICAL HISTORY: Stroke, vision change, vision loss in right eye  TECHNIQUE: Spiral CT images were obtained from the base of the skull to the vertex both pre and post intravenous contrast images were reformatted and submitted in 3 mm thick axial CT section with brain algorithm and 2 mm thick sagittal and coronal reconstructions were performed and submitted in brain algorithm. Additional spiral CT images were obtained from the top of the aortic arch up through the great vessels of the head and neck during arterial phase of contrast and images were reformatted and submitted in 1 mm thick axial sagittal and coronal CT sections and additional 3D reconstructions were performed to complete the CT angiogram of the head and neck.  FINDINGS: HEAD CT: The brain parenchyma is normal in attenuation. The ventricles are normal in size. I see no focal mass effect and no midline shift and no extra-axial fluid collections are identified and there is no evidence of acute intracranial hemorrhage. No abnormal areas of enhancement are seen in the head. The paranasal sinuses and mastoid air cells and middle ear cavities are clear. The calvarium and skull base are normal in appearance.  CT ANGIOGRAM OF THE NECK: The nasopharynx, oropharynx, hypopharynx, true cords and subglottic airway are normal in appearance. Thyroid gland enhances homogeneously and is normal in appearance. The lung apices are clear. The parotid, , parapharyngeal and submandibular spaces are symmetric and are normal in appearance.  There is anatomic origin of the great vessels off the aortic arch. The left subclavian artery origin is normal in appearance. No stenosis is seen in the left subclavian artery. There is mixed calcified and noncalcified plaque that mildly narrows the origin of the left vertebral artery beyond its origin. The left vertebral artery is widely patent without stenosis to the vertebrobasilar junction. The left common carotid origin is normal in appearance. No stenosis is seen in the left common carotid artery. There is mixed calcified and noncalcified plaque involving posterior lateral wall. The left common carotid bifurcation extends into the origin of the left internal carotid artery but there is no stenosis in the left internal carotid artery using the NASCET criteria. The brachiocephalic artery origin is normal in appearance and no stenosis seen in the brachycephalic artery and its bifurcation into the right subclavian and common carotid arteries is normal in appearance. No stenosis is seen in the right subclavian artery. The right vertebral artery origin is normal in appearance and no stenosis is seen in the right vertebral artery from its origin to the vertebrobasilar junction. The right common carotid origin is normal in appearance and no stenosis is seen in the right common carotid artery. There is mixed calcified and noncalcified plaque involving the posterior wall of the right common carotid bifurcation extending into the posterior aspect of the origin of the right internal carotid artery. It is best seen on sagittal reconstructed images 64 through 68. It is a very focal 1 mm long weblike stenosis at the origin of the right internal carotid artery that measures up to 50% using the NASCET criteria. No additional stenosis is seen in the right internal carotid artery.  CT ANGIOGRAM OF THE HEAD: CT angiographic images of the head demonstrate normal appearance of the intracranial segments of the distal vertebral  arteries and normal appearance of the basilar artery and basilar tip as well as the posterior cerebral and superior cerebellar arteries bilaterally. The upper cervical, petrous, cavernous and supracavernous segments of the internal carotid arteries are normal in appearance. The anterior and middle cerebral arteries are normal in appearance. The anterior communicating artery origin and middle cerebral artery trifurcations are normal in appearance.      1. Calcified plaque mildly narrows the left vertebral artery origin. 2. Mixed calcified and noncalcified plaque extends from the posterior wall of the right common carotid artery into the posterior aspect of the origin of the right internal carotid artery and very focally results in a 1 mm long very focal up to 50% stenosis of the origin of the right internal carotid artery using the NASCET criteria. 3. There is mild cervical spondylosis, posterior endplate spurs and uncovertebral joint hypertrophy contributing to mild canal and mild right and moderate left foraminal narrowing at C5-6 and up to mild-to-moderate canal and moderate to severe left foraminal narrowing at C6-7. The remainder of the CT angiogram of the head and neck is normal.    Radiation dose reduction techniques were utilized, including automated exposure control and exposure modulation based on body size.         Medical Decision Making:  Consulting neurology for recommendations.       Diagnosis  Final diagnoses:   Amaurosis fugax of right eye   TIA (transient ischemic attack)        Pepito Cortes MD  03/18/20 4059

## 2020-03-18 NOTE — PROGRESS NOTES
Procedure     ECG 12 Lead  Date/Time: 3/18/2020 9:49 AM  Performed by: Carmina Awad APRN  Authorized by: Carmina Awad APRN   Previous ECG: no previous ECG available  Rhythm: sinus rhythm  Rate: normal  BPM: 69  QRS axis: normal  Other findings: non-specific ST-T wave changes

## 2020-03-18 NOTE — ED NOTES
Pt to this ER, c/o vision changes, lost vision in R eye on Monday.Denies HA at that time or any numbness or weakness.  Went to the eye doc that day, stated that they advised they could find nothing wrong. Sent pt to PCP, who wanted pt to see a neuro, neuro advised pt to come to ER for testing.   Pt in NAD, denies recent illness, Moriah STRICKLAND [place], DEEP  03/18/20 1179

## 2020-03-18 NOTE — PROGRESS NOTES
"Subjective   Ramirez Pena is a 54 y.o. male   who presents for   Chief Complaint   Patient presents with   • Eye Problem     totally grey on the right eye. started on Monday       Loss of vision in right eye, everything went gray, when it occurred, he was sitting funny, head tilted on his hand, occurred when standing  Went to ophthalmologist on Monday, states  Didn't feel it was urgent to sent to office for follow up, states he is stroke candidate  Occurred at 1:30 on Monday, resolved within 30 minutes  Double vision, currently, denies dizziness, no lightheaded upon standing, denies photophobia or phonophobia  Decreased sleep past few nights    States looked like a gray cloud, closed over on right side, not in center, then opened  Feels like something is in his right ear  Has chronic allergies, felt like water in his ear, but states it won't come out    Hx of smoking, cigars  Denies vaping use  Denies marijuana use  Occasional alcohol use    Mother with hx of congestive heart failure, denies hx of stroke or MI  Denies palpitations or chest pain    States abdomen doesn't feel empty  Denies bloating or cramping, feels full  Weight is stable, no weight loss, no change in appetite, does not feel full quicker    /84   Pulse 73   Temp 97.5 °F (36.4 °C)   Resp 16   Ht 175.3 cm (69\")   Wt 95.3 kg (210 lb)   SpO2 99%   BMI 31.01 kg/m²       History of Present Illness   Loss of Vision   This is a new problem. Episode onset: x 2 days. The problem occurs intermittently. The problem has been waxing and waning. Associated symptoms include a visual change (right eye loss, double vision). Pertinent negatives include no abdominal pain, anorexia, arthralgias, change in bowel habit, chest pain, chills, congestion, coughing, diaphoresis, fatigue, fever, headaches, joint swelling, myalgias, nausea, neck pain, numbness, rash, sore throat, swollen glands, urinary symptoms, vertigo, vomiting or weakness. Nothing " aggravates the symptoms. He has tried nothing for the symptoms. The treatment provided no relief.       The following portions of the patient's history were reviewed and updated as appropriate: allergies, current medications, past family history, past medical history, past social history, past surgical history and problem list.    Review of Systems  Review of Systems   Constitutional: Negative for chills, diaphoresis, fatigue and fever.   HENT: Negative for congestion and sore throat.    Eyes: Positive for visual disturbance (right eye, denies dizziness, ). Negative for photophobia, pain, discharge, redness and itching.   Respiratory: Negative for cough.    Cardiovascular: Negative for chest pain.   Gastrointestinal: Positive for abdominal distention (fullness, lower belly, denies bloating or pain). Negative for abdominal pain, anorexia, change in bowel habit, nausea and vomiting.   Musculoskeletal: Negative for arthralgias, joint swelling, myalgias and neck pain.   Skin: Negative for rash.   Neurological: Negative for dizziness, vertigo, tremors, seizures, syncope, facial asymmetry, speech difficulty, weakness, light-headedness, numbness and headaches.       Objective   Physical Exam  Physical Exam   Constitutional: He is oriented to person, place, and time. He appears well-developed and well-nourished. No distress.   HENT:   Head: Normocephalic and atraumatic.   Right Ear: Tympanic membrane and ear canal normal.   Left Ear: Tympanic membrane and ear canal normal.   Nose: Nose normal.   Mouth/Throat: Uvula is midline.   Eyes: Pupils are equal, round, and reactive to light. Conjunctivae, EOM and lids are normal.   Cardiovascular: Normal rate, regular rhythm and normal heart sounds. Exam reveals no gallop and no friction rub.   No murmur heard.  Pulmonary/Chest: Effort normal and breath sounds normal. No stridor. No respiratory distress. He has no wheezes. He has no rales.   Neurological: He is alert and oriented to  person, place, and time.   Skin: He is not diaphoretic.   Vitals reviewed.        Assessment/Plan   Ramirez was seen today for eye problem.    Diagnoses and all orders for this visit:    Amaurosis fugax of right eye  Comments:  per ophthalmologist    Double vision  -     Ambulatory Referral to Neurology  -     CBC & Differential  -     Comprehensive Metabolic Panel    Vision loss, right eye  -     Ambulatory Referral to Neurology  -     CBC & Differential  -     Comprehensive Metabolic Panel    Family history of colon cancer  -     Ambulatory Referral For Screening Colonoscopy    Screening for colon cancer  -     Ambulatory Referral For Screening Colonoscopy    Screening for thyroid disorder  -     TSH Rfx On Abnormal To Free T4    Screening for prostate cancer  -     PSA Screen    Screening for lipoid disorders  -     Lipid Panel With LDL / HDL Ratio    Encounter for screening examination for impaired glucose regulation and diabetes mellitus  -     Hemoglobin A1c    will refer to neurology urgently for further evaluation  Ophthalmologist recommended echo and carotid screening, will hold, consider CT, will defer further work up to neurology  Will obtain labs and ekg today, last labs 2017, no ekg on file, although denies palpitations or chest pain  For acute changes, recommended ER follow up given symptoms  Denies family hx of CVA or MI    Unable to get in to neurology urgently, will refer to ER at their direction for further imaging  No symptoms currently  Patient left ambulatory, in stable condition

## 2020-03-18 NOTE — ED TRIAGE NOTES
Monday lost vision in right eye.  That resolved.  Seen at eye dr Monday - the eye is ok.  F/u c pmd today.  He had some blurry vision off and on since then

## 2020-03-18 NOTE — PLAN OF CARE
Vss, no falls, no pain, nih-0, passed bedside swallow, MRI pending, loading dose plavix, ad magalie, poss dc tomorrow, continue to monitor

## 2020-03-18 NOTE — SIGNIFICANT NOTE
03/18/20 1400   Rehab Time/Intention   Evaluation Not Performed patient unavailable for evaluation  (Pt KASHIF, passed RN swallow screen, and on a diet. SLP will follow up as indicated next date.)   Rehab Treatment   Discipline speech language pathologist

## 2020-03-18 NOTE — CONSULTS
"Neurology Consult Note    Consult Date: 3/18/2020    Referring MD: Luiz Aquino MD    Reason for Consult I have been asked to see the patient in neurological consultation to render advice and opinion regarding TIA    Ramirez Pena is a 54 y.o. male with past medical history of sleep apnea on CPAP, otherwise healthy.  He had an episode on Monday of transient monocular vision loss in the right eye that lasted about 10 minutes.  There were no relieving or exacerbating factors.  No headache or scintillating scotoma or photophobia or nausea.  No unilateral weakness or numbness.  No prior history of TIA.  He denies hypertension or hyperlipidemia.  He smokes a cigar occasionally but denies regular tobacco use.  He reports that his mother had bilateral carotid stenosis requiring carotid endarterectomies.    Past Medical History:   Diagnosis Date   • Amaurosis fugax of right eye 3/18/2020   • Depression    • ROBYN on CPAP     AHI 12.5       ROS:  No fevers, chills  No weakness, numbness, + vision loss  No chest pain, palpitations  No shortness of air, cough    Exam    /88 (BP Location: Left arm, Patient Position: Sitting)   Pulse 70   Temp 97.9 °F (36.6 °C) (Oral)   Resp 18   Ht 175.3 cm (69\")   Wt 95.3 kg (210 lb)   SpO2 100%   BMI 31.01 kg/m²   Gen: NAD, vitals reviewed  MS: oriented x3, recent/remote memory intact, normal attention/concentration, language intact, no neglect.  CN: visual acuity grossly normal, PERRL, EOMI, no facial droop, no dysarthria  Motor: 5/5 throughout upper and lower extremities, normal tone    DATA:    Lab Results   Component Value Date    GLUCOSE 94 03/18/2020    CALCIUM 9.0 03/18/2020     (L) 03/18/2020    K 4.1 03/18/2020    CO2 25.9 03/18/2020    CL 98 03/18/2020    BUN 7 03/18/2020    CREATININE 0.87 03/18/2020    EGFRIFAFRI 104 03/21/2017    EGFRIFNONA 91 03/18/2020    BCR 8.0 03/18/2020    ANIONGAP 11.1 03/18/2020     Lab Results   Component Value Date    WBC 4.91 " 03/18/2020    HGB 15.6 03/18/2020    HCT 44.3 03/18/2020    MCV 90.2 03/18/2020     03/18/2020       Lab review: CBC, BMP unremarkable    Imaging review: I personally reviewed his CTA head and neck which shows 50% right ICA stenosis.  I reviewed the radiology report.    Diagnoses:  Right carotid stenosis, symptomatic  Amaurosis fugax  History of sleep apnea    Comment: Amaurosis fugax due to right carotid stenosis, moderate.  We will treat him with aspirin, Plavix and Lipitor.  Waiting on MRI to complete his work-up.  If this is negative and he remains stable he can likely be discharged tomorrow afternoon    PLAN:  -Plavix 300 mg load, then /plavix 75 for one month, then stop ASA and continue plavix 75 alone  -Lipitor 80. Lipid panel pending  -uses CPAP for ROBYN  -f/u 2D echo, MRI  -likely discharge tomorrow depending on results of above studies  -will need follow up carotid U/S or CTA in 6 months.

## 2020-03-19 VITALS
WEIGHT: 210 LBS | HEART RATE: 75 BPM | RESPIRATION RATE: 18 BRPM | DIASTOLIC BLOOD PRESSURE: 74 MMHG | OXYGEN SATURATION: 97 % | BODY MASS INDEX: 31.1 KG/M2 | TEMPERATURE: 97.9 F | HEIGHT: 69 IN | SYSTOLIC BLOOD PRESSURE: 113 MMHG

## 2020-03-19 DIAGNOSIS — I65.21 STENOSIS OF RIGHT CAROTID ARTERY: Primary | ICD-10-CM

## 2020-03-19 PROBLEM — E11.9 TYPE 2 DIABETES MELLITUS (HCC): Status: ACTIVE | Noted: 2020-03-19

## 2020-03-19 PROBLEM — I65.29 CAROTID STENOSIS: Status: ACTIVE | Noted: 2020-03-19

## 2020-03-19 LAB
ANION GAP SERPL CALCULATED.3IONS-SCNC: 12.1 MMOL/L (ref 5–15)
BASOPHILS # BLD AUTO: 0.03 10*3/MM3 (ref 0–0.2)
BASOPHILS NFR BLD AUTO: 0.5 % (ref 0–1.5)
BUN BLD-MCNC: 8 MG/DL (ref 6–20)
BUN/CREAT SERPL: 11.3 (ref 7–25)
CALCIUM SPEC-SCNC: 8.8 MG/DL (ref 8.6–10.5)
CHLORIDE SERPL-SCNC: 99 MMOL/L (ref 98–107)
CHOLEST SERPL-MCNC: 167 MG/DL (ref 0–200)
CO2 SERPL-SCNC: 21.9 MMOL/L (ref 22–29)
CREAT BLD-MCNC: 0.71 MG/DL (ref 0.76–1.27)
DEPRECATED RDW RBC AUTO: 40.8 FL (ref 37–54)
EOSINOPHIL # BLD AUTO: 0.1 10*3/MM3 (ref 0–0.4)
EOSINOPHIL NFR BLD AUTO: 1.8 % (ref 0.3–6.2)
ERYTHROCYTE [DISTWIDTH] IN BLOOD BY AUTOMATED COUNT: 12.4 % (ref 12.3–15.4)
GFR SERPL CREATININE-BSD FRML MDRD: 116 ML/MIN/1.73
GLUCOSE BLD-MCNC: 85 MG/DL (ref 65–99)
GLUCOSE BLDC GLUCOMTR-MCNC: 102 MG/DL (ref 70–130)
GLUCOSE BLDC GLUCOMTR-MCNC: 116 MG/DL (ref 70–130)
HCT VFR BLD AUTO: 41.8 % (ref 37.5–51)
HDLC SERPL-MCNC: 33 MG/DL (ref 40–60)
HGB BLD-MCNC: 14.6 G/DL (ref 13–17.7)
IMM GRANULOCYTES # BLD AUTO: 0.02 10*3/MM3 (ref 0–0.05)
IMM GRANULOCYTES NFR BLD AUTO: 0.4 % (ref 0–0.5)
LDLC SERPL CALC-MCNC: 112 MG/DL (ref 0–100)
LDLC/HDLC SERPL: 3.41 {RATIO}
LYMPHOCYTES # BLD AUTO: 1.88 10*3/MM3 (ref 0.7–3.1)
LYMPHOCYTES NFR BLD AUTO: 34.3 % (ref 19.6–45.3)
MCH RBC QN AUTO: 31.3 PG (ref 26.6–33)
MCHC RBC AUTO-ENTMCNC: 34.9 G/DL (ref 31.5–35.7)
MCV RBC AUTO: 89.5 FL (ref 79–97)
MONOCYTES # BLD AUTO: 0.62 10*3/MM3 (ref 0.1–0.9)
MONOCYTES NFR BLD AUTO: 11.3 % (ref 5–12)
NEUTROPHILS # BLD AUTO: 2.83 10*3/MM3 (ref 1.7–7)
NEUTROPHILS NFR BLD AUTO: 51.7 % (ref 42.7–76)
NRBC BLD AUTO-RTO: 0 /100 WBC (ref 0–0.2)
PLATELET # BLD AUTO: 164 10*3/MM3 (ref 140–450)
PMV BLD AUTO: 9.7 FL (ref 6–12)
POTASSIUM BLD-SCNC: 3.8 MMOL/L (ref 3.5–5.2)
RBC # BLD AUTO: 4.67 10*6/MM3 (ref 4.14–5.8)
SODIUM BLD-SCNC: 133 MMOL/L (ref 136–145)
TRIGL SERPL-MCNC: 108 MG/DL (ref 0–150)
VLDLC SERPL-MCNC: 21.6 MG/DL (ref 5–40)
WBC NRBC COR # BLD: 5.48 10*3/MM3 (ref 3.4–10.8)

## 2020-03-19 PROCEDURE — 82962 GLUCOSE BLOOD TEST: CPT

## 2020-03-19 PROCEDURE — 85025 COMPLETE CBC W/AUTO DIFF WBC: CPT | Performed by: INTERNAL MEDICINE

## 2020-03-19 PROCEDURE — 36415 COLL VENOUS BLD VENIPUNCTURE: CPT | Performed by: INTERNAL MEDICINE

## 2020-03-19 PROCEDURE — 80061 LIPID PANEL: CPT | Performed by: PSYCHIATRY & NEUROLOGY

## 2020-03-19 PROCEDURE — 80048 BASIC METABOLIC PNL TOTAL CA: CPT | Performed by: INTERNAL MEDICINE

## 2020-03-19 PROCEDURE — 99214 OFFICE O/P EST MOD 30 MIN: CPT | Performed by: NURSE PRACTITIONER

## 2020-03-19 PROCEDURE — G0378 HOSPITAL OBSERVATION PER HR: HCPCS

## 2020-03-19 RX ORDER — ATORVASTATIN CALCIUM 80 MG/1
80 TABLET, FILM COATED ORAL NIGHTLY
Qty: 30 TABLET | Refills: 0 | Status: SHIPPED | OUTPATIENT
Start: 2020-03-19 | End: 2020-04-16 | Stop reason: SDUPTHER

## 2020-03-19 RX ORDER — ECHINACEA PURPUREA EXTRACT 125 MG
2 TABLET ORAL AS NEEDED
Status: DISCONTINUED | OUTPATIENT
Start: 2020-03-19 | End: 2020-03-19 | Stop reason: HOSPADM

## 2020-03-19 RX ORDER — ECHINACEA PURPUREA EXTRACT 125 MG
2 TABLET ORAL AS NEEDED
Refills: 12
Start: 2020-03-19 | End: 2021-04-02

## 2020-03-19 RX ORDER — CLOPIDOGREL BISULFATE 75 MG/1
75 TABLET ORAL DAILY
Qty: 30 TABLET | Refills: 0 | Status: SHIPPED | OUTPATIENT
Start: 2020-03-20 | End: 2020-04-16 | Stop reason: SDUPTHER

## 2020-03-19 RX ORDER — ASPIRIN 325 MG
325 TABLET ORAL DAILY
Qty: 30 TABLET | Refills: 0 | Status: SHIPPED | OUTPATIENT
Start: 2020-03-20 | End: 2020-04-19

## 2020-03-19 RX ADMIN — CLOPIDOGREL 75 MG: 75 TABLET, FILM COATED ORAL at 08:55

## 2020-03-19 RX ADMIN — ASPIRIN 325 MG: 325 TABLET ORAL at 08:55

## 2020-03-19 RX ADMIN — SODIUM CHLORIDE, PRESERVATIVE FREE 10 ML: 5 INJECTION INTRAVENOUS at 08:55

## 2020-03-19 NOTE — DISCHARGE INSTRUCTIONS
Follow up with your PCP regarding elevated Hemoglobin A1C. Your level was 7.1%. Goal <6%.    You will need follow up carotid ultrasound every 6 months.     BP goals: <130/80.

## 2020-03-19 NOTE — DISCHARGE SUMMARY
Patient Name: Ramirez Pena  : 1965  MRN: [id number]    Date of Admission: 3/18/2020  Date of Discharge:  3/19/2020  Primary Care Physician: Carmina Awad APRN      Chief Complaint:   Decreased Visual Acuity      Discharge Diagnoses     Active Hospital Problems    Diagnosis  POA   • **Amaurosis fugax of right eye [G45.3]  Yes   • Carotid stenosis [I65.29]  Yes   • Type 2 diabetes mellitus (CMS/HCC) [E11.9]  Yes   • Transient ischemic attack (TIA) [G45.9]  Yes   • Allergies [T78.40XA]  Yes      Resolved Hospital Problems   No resolved problems to display.        Hospital Course     Mr. Pena is a 54 y.o. male with a history of allergies, depression  who presented to [facility] initially complaining of rt visual deficit.  Please see the admitting history and physical for further details.  There was concern for TIA vs CVA and was admitted to the hospital for further evaluation and treatment.  Neurology was consulted. He underwent full neurological workup. MRI brain was negative for acute infarction but there was evidence of old posterior inferior rt cerebellar infarcts in rt PICA territory. CTA H/N showed 50% stenosis rt ICA & mixed calcified and noncalcified plaque. Echo EF 66%, Normal LA size; saline test negative. A1C is elevated 7.1%. . He was given Plavix load and will continue  mg plus Plavix 75 mg daily x 30 days, then stop ASA. Lipitor 80 mg daily has been started. Diabetes educator saw pt and discussed dietary modifications. He will f/u with PCP for continued DM management. He will need a carotid doppler q 6 months and annual f/u for carotid disease. BP goal < 130/80. He will f/u with Neurology per their recommendation. Medically, he is stable for discharge home.           Day of Discharge     Feels well. Vision remains improved. Agrees with discharge home    Physical Exam:  Temp:  [97.9 °F (36.6 °C)-98 °F (36.7 °C)] 97.9 °F (36.6 °C)  Heart Rate:  [63-75]  75  Resp:  [16-18] 18  BP: (113-139)/(74-89) 113/74  Body mass index is 31.01 kg/m².  Physical Exam   Constitutional: He is oriented to person, place, and time. No distress.   HENT:   Head: Normocephalic.   Eyes: EOM are normal.   Neck: No JVD present.   Cardiovascular: Normal rate and regular rhythm.   Pulmonary/Chest: Effort normal and breath sounds normal.   Abdominal: Soft. Bowel sounds are normal.   Musculoskeletal: He exhibits no edema.   Neurological: He is alert and oriented to person, place, and time.   Skin: Skin is warm and dry.   Psychiatric: He has a normal mood and affect.   Vitals reviewed.      Consultants     Consult Orders (all) (From admission, onward)     Start     Ordered    03/19/20 0000  Inpatient Neuro Clinical Specialist Consult  Once     Provider:  (Not yet assigned)    03/18/20 1154    03/18/20 1153  Inpatient Rehab Admission Consult  Once     Provider:  (Not yet assigned)    03/18/20 1154    03/18/20 1153  Inpatient Case Management  Consult  Once     Provider:  (Not yet assigned)    03/18/20 1154    03/18/20 1153  Inpatient Diabetes Educator Consult  Once     Provider:  (Not yet assigned)    03/18/20 1154    03/18/20 1054  [facility] (on-call MD unless specified) Details  Once     Specialty:  Hospitalist  Provider:  (Not yet assigned)    03/18/20 1053    03/18/20 1038  Inpatient Neurology Consult General  Once     Specialty:  Neurology  Provider:  (Not yet assigned)    03/18/20 1037              Procedures     Imaging Results (All)     Procedure Component Value Units Date/Time    MRI Brain Without Contrast [[id number]] Collected:  03/18/20 1757     Updated:  03/18/20 1906    Narrative:       MRI OF THE BRAIN WITHOUT CONTRAST 03/18/2020     CLINICAL HISTORY: Stroke. The patient had vision loss in right eye that  started Monday, 03/16/2020.     TECHNIQUE: Axial T1, FLAIR, fat-suppressed T2, axial diffusion and  gradient echo T2 and sagittal T1-weighted images were obtained of  the  entire head.     This is correlated to the CT angiogram of the head and neck performed  earlier today 03/18/2020 at 1:30 PM. There are no prior MRIs of the head  for comparison.     FINDINGS: There are 2 adjacent 10 x 2 mm old posterior inferior medial  right cerebellar infarcts, an additional 15 x 3 mm old posterior lateral  right cerebellar infarct and these 3 old infarcts are in the right PICA  territory. There is very minimal hazy T2 high signal in the  periventricular white matter. A tiny 2 mm nodular focus in the superior  left frontal subcortical white matter consistent with very minimal small  vessel disease. The remainder of the brain parenchyma is normal in  signal intensity. Specifically no diffusion-weighted abnormality seen  with no acute infarct identified. On the gradient echo T2 weighted  images no acute or old blood breakdown products are seen intracranially.  The ventricles are normal in size. I see no focal mass effect. There is  no midline shift. No extra axial fluid collections are identified.  Paranasal sinuses and the mastoid air cells and middle ear cavities are  clear. Good flow voids are demonstrated within the cerebral vessels and  in the dural venous sinuses. The calvarium and skull base demonstrate  normal marrow signal intensity. The orbits are unremarkable.       Impression:       1. There are 3 separate small old posterior inferior right cerebellar  infarcts in the right PICA territory; otherwise, normal MRI of the brain  with no acute infarcts seen.     This report was finalized on 3/18/2020 7:03 PM by Dr. Israel Menezes M.D.       CT Angiogram Head [[id number]] Collected:  03/18/20 1518     Updated:  03/18/20 1658    Narrative:       CONTRAST-ENHANCED CT ANGIOGRAM OF THE HEAD AND NECK 03/18/2020     CLINICAL HISTORY: Stroke, vision change, vision loss in right eye     TECHNIQUE: Spiral CT images were obtained from the base of the skull to  the vertex both pre and post intravenous  contrast images were  reformatted and submitted in 3 mm thick axial CT section with brain  algorithm and 2 mm thick sagittal and coronal reconstructions were  performed and submitted in brain algorithm. Additional spiral CT images  were obtained from the top of the aortic arch up through the great  vessels of the head and neck during arterial phase of contrast and  images were reformatted and submitted in 1 mm thick axial sagittal and  coronal CT sections and additional 3D reconstructions were performed to  complete the CT angiogram of the head and neck.     FINDINGS:  HEAD CT: The brain parenchyma is normal in attenuation. The ventricles  are normal in size. I see no focal mass effect and no midline shift and  no extra-axial fluid collections are identified and there is no evidence  of acute intracranial hemorrhage. No abnormal areas of enhancement are  seen in the head. The paranasal sinuses and mastoid air cells and middle  ear cavities are clear. The calvarium and skull base are normal in  appearance.     CT ANGIOGRAM OF THE NECK: The nasopharynx, oropharynx, hypopharynx, true  cords and subglottic airway are normal in appearance. Thyroid gland  enhances homogeneously and is normal in appearance. The lung apices are  clear. The parotid, , parapharyngeal and submandibular spaces  are symmetric and are normal in appearance. There is anatomic origin of  the great vessels off the aortic arch. The left subclavian artery origin  is normal in appearance. No stenosis is seen in the left subclavian  artery. There is mixed calcified and noncalcified plaque that mildly  narrows the origin of the left vertebral artery beyond its origin. The  left vertebral artery is widely patent without stenosis to the  vertebrobasilar junction. The left common carotid origin is normal in  appearance. No stenosis is seen in the left common carotid artery. There  is mixed calcified and noncalcified plaque involving posterior  lateral  wall. The left common carotid bifurcation extends into the origin of the  left internal carotid artery but there is no stenosis in the left  internal carotid artery using the NASCET criteria. The brachiocephalic  artery origin is normal in appearance and no stenosis seen in the  brachycephalic artery and its bifurcation into the right subclavian and  common carotid arteries is normal in appearance. No stenosis is seen in  the right subclavian artery. The right vertebral artery origin is normal  in appearance and no stenosis is seen in the right vertebral artery from  its origin to the vertebrobasilar junction. The right common carotid  origin is normal in appearance and no stenosis is seen in the right  common carotid artery. There is mixed calcified and noncalcified plaque  involving the posterior wall of the right common carotid bifurcation  extending into the posterior aspect of the origin of the right internal  carotid artery. It is best seen on sagittal reconstructed images 64  through 68. It is a very focal 1 mm long weblike stenosis at the origin  of the right internal carotid artery that measures up to 50% using the  NASCET criteria. No additional stenosis is seen in the right internal  carotid artery.     CT ANGIOGRAM OF THE HEAD: CT angiographic images of the head demonstrate  normal appearance of the intracranial segments of the distal vertebral  arteries and normal appearance of the basilar artery and basilar tip as  well as the posterior cerebral and superior cerebellar arteries  bilaterally. The upper cervical, petrous, cavernous and supracavernous  segments of the internal carotid arteries are normal in appearance. The  anterior and middle cerebral arteries are normal in appearance. The  anterior communicating artery origin and middle cerebral artery  trifurcations are normal in appearance.       Impression:       1. Calcified plaque mildly narrows the left vertebral artery origin.  2. Mixed  calcified and noncalcified plaque extends from the posterior  wall of the right common carotid artery into the posterior aspect of the  origin of the right internal carotid artery and very focally results in  a 1 mm long very focal up to 50% stenosis of the origin of the right  internal carotid artery using the NASCET criteria.  3. There is mild cervical spondylosis, posterior endplate spurs and  uncovertebral joint hypertrophy contributing to mild canal and mild  right and moderate left foraminal narrowing at C5-6 and up to  mild-to-moderate canal and moderate to severe left foraminal narrowing  at C6-7. The remainder of the CT angiogram of the head and neck is  normal.           Radiation dose reduction techniques were utilized, including automated  exposure control and exposure modulation based on body size.     This report was finalized on 3/18/2020 4:55 PM by Dr. Israel Menezes M.D.       CT Angiogram Neck [[id number]] Collected:  03/18/20 1518     Updated:  03/18/20 1658    Narrative:       CONTRAST-ENHANCED CT ANGIOGRAM OF THE HEAD AND NECK 03/18/2020     CLINICAL HISTORY: Stroke, vision change, vision loss in right eye     TECHNIQUE: Spiral CT images were obtained from the base of the skull to  the vertex both pre and post intravenous contrast images were  reformatted and submitted in 3 mm thick axial CT section with brain  algorithm and 2 mm thick sagittal and coronal reconstructions were  performed and submitted in brain algorithm. Additional spiral CT images  were obtained from the top of the aortic arch up through the great  vessels of the head and neck during arterial phase of contrast and  images were reformatted and submitted in 1 mm thick axial sagittal and  coronal CT sections and additional 3D reconstructions were performed to  complete the CT angiogram of the head and neck.     FINDINGS:  HEAD CT: The brain parenchyma is normal in attenuation. The ventricles  are normal in size. I see no focal mass  effect and no midline shift and  no extra-axial fluid collections are identified and there is no evidence  of acute intracranial hemorrhage. No abnormal areas of enhancement are  seen in the head. The paranasal sinuses and mastoid air cells and middle  ear cavities are clear. The calvarium and skull base are normal in  appearance.     CT ANGIOGRAM OF THE NECK: The nasopharynx, oropharynx, hypopharynx, true  cords and subglottic airway are normal in appearance. Thyroid gland  enhances homogeneously and is normal in appearance. The lung apices are  clear. The parotid, , parapharyngeal and submandibular spaces  are symmetric and are normal in appearance. There is anatomic origin of  the great vessels off the aortic arch. The left subclavian artery origin  is normal in appearance. No stenosis is seen in the left subclavian  artery. There is mixed calcified and noncalcified plaque that mildly  narrows the origin of the left vertebral artery beyond its origin. The  left vertebral artery is widely patent without stenosis to the  vertebrobasilar junction. The left common carotid origin is normal in  appearance. No stenosis is seen in the left common carotid artery. There  is mixed calcified and noncalcified plaque involving posterior lateral  wall. The left common carotid bifurcation extends into the origin of the  left internal carotid artery but there is no stenosis in the left  internal carotid artery using the NASCET criteria. The brachiocephalic  artery origin is normal in appearance and no stenosis seen in the  brachycephalic artery and its bifurcation into the right subclavian and  common carotid arteries is normal in appearance. No stenosis is seen in  the right subclavian artery. The right vertebral artery origin is normal  in appearance and no stenosis is seen in the right vertebral artery from  its origin to the vertebrobasilar junction. The right common carotid  origin is normal in appearance and no  stenosis is seen in the right  common carotid artery. There is mixed calcified and noncalcified plaque  involving the posterior wall of the right common carotid bifurcation  extending into the posterior aspect of the origin of the right internal  carotid artery. It is best seen on sagittal reconstructed images 64  through 68. It is a very focal 1 mm long weblike stenosis at the origin  of the right internal carotid artery that measures up to 50% using the  NASCET criteria. No additional stenosis is seen in the right internal  carotid artery.     CT ANGIOGRAM OF THE HEAD: CT angiographic images of the head demonstrate  normal appearance of the intracranial segments of the distal vertebral  arteries and normal appearance of the basilar artery and basilar tip as  well as the posterior cerebral and superior cerebellar arteries  bilaterally. The upper cervical, petrous, cavernous and supracavernous  segments of the internal carotid arteries are normal in appearance. The  anterior and middle cerebral arteries are normal in appearance. The  anterior communicating artery origin and middle cerebral artery  trifurcations are normal in appearance.       Impression:       1. Calcified plaque mildly narrows the left vertebral artery origin.  2. Mixed calcified and noncalcified plaque extends from the posterior  wall of the right common carotid artery into the posterior aspect of the  origin of the right internal carotid artery and very focally results in  a 1 mm long very focal up to 50% stenosis of the origin of the right  internal carotid artery using the NASCET criteria.  3. There is mild cervical spondylosis, posterior endplate spurs and  uncovertebral joint hypertrophy contributing to mild canal and mild  right and moderate left foraminal narrowing at C5-6 and up to  mild-to-moderate canal and moderate to severe left foraminal narrowing  at C6-7. The remainder of the CT angiogram of the head and neck is  normal.              Radiation dose reduction techniques were utilized, including automated  exposure control and exposure modulation based on body size.     This report was finalized on 3/18/2020 4:55 PM by Dr. Israel Menezes M.D.             Pertinent Labs     Results from last 7 days   Lab Units 03/19/20  0435 03/18/20  1110 03/18/20  0857   WBC 10*3/mm3 5.48 4.91 5.83   HEMOGLOBIN g/dL 14.6 15.6 15.6   PLATELETS 10*3/mm3 164 181 196     Results from last 7 days   Lab Units 03/19/20  0435 03/18/20  1110 03/18/20  0857   SODIUM mmol/L 133* 135* 137   POTASSIUM mmol/L 3.8 4.1 4.3   CHLORIDE mmol/L 99 98 99   TOTAL CO2 mmol/L  --   --  25.8   CO2 mmol/L 21.9* 25.9  --    BUN mg/dL 8 7 8   CREATININE mg/dL 0.71* 0.87 0.95   GLUCOSE mg/dL 85 94  --    Estimated Creatinine Clearance: 135.4 mL/min (A) (by C-G formula based on SCr of 0.71 mg/dL (L)).  Results from last 7 days   Lab Units 03/18/20  1110 03/18/20  0857   ALBUMIN g/dL 5.00 4.60   BILIRUBIN mg/dL 0.6 0.6   ALK PHOS U/L 47 50   AST (SGOT) U/L 19 21   ALT (SGPT) U/L 34 35     Results from last 7 days   Lab Units 03/19/20  0435 03/18/20  1110 03/18/20  0857   CALCIUM mg/dL 8.8 9.0 9.1   ALBUMIN g/dL  --  5.00 4.60       Results from last 7 days   Lab Units 03/18/20  1110   TROPONIN T ng/mL <0.010       Results from last 7 days   Lab Units 03/19/20  0435   CHOLESTEROL mg/dL 167   TRIGLYCERIDES mg/dL 108   HDL CHOL mg/dL 33*   LDL CHOL mg/dL 112*           Test Results Pending at Discharge       Discharge Details        Discharge Medications      New Medications      Instructions Start Date   aspirin 325 MG tablet  Notes to patient:  NEXT DOSE DUE: 3/20 8AM   325 mg, Oral, Daily, Continue for total 30 days then stop   Start Date:  March 20, 2020     atorvastatin 80 MG tablet  Commonly known as:  LIPITOR  Notes to patient:  NEXT DOSE DUE: 3/19 8PM   80 mg, Oral, Nightly      clopidogrel 75 MG tablet  Commonly known as:  PLAVIX  Notes to patient:  NEXT DOSE DUE: 3/20 8AM   75 mg, Oral,  Daily   Start Date:  March 20, 2020     sodium chloride 0.65 % nasal spray   2 sprays, Nasal, As Needed         Continue These Medications      Instructions Start Date   amoxicillin-clavulanate 875-125 MG per tablet  Commonly known as:  Augmentin   1 tablet, Oral, Every 12 Hours Scheduled      azelastine 0.1 % nasal spray  Commonly known as:  ASTELIN  Notes to patient:  NEXT DOSE DUE: 3/19 8PM   INHALE 2 SPRAYS IEN QHS      fexofenadine-pseudoephedrine 180-240 MG per 24 hr tablet  Commonly known as:  Allegra-D Allergy & Congestion  Notes to patient:  NEXT DOSE DUE: 3/20 8AM   1 tablet, Oral, Daily      fluticasone 50 MCG/ACT nasal spray  Commonly known as:  FLONASE  Notes to patient:  NEXT DOSE DUE: 3/20 8AM   2 sprays, Nasal, Daily      montelukast 10 MG tablet  Commonly known as:  Singulair  Notes to patient:  NEXT DOSE DUE: 3/19 8PM   10 mg, Oral, Nightly      predniSONE 20 MG tablet  Commonly known as:  DELTASONE  Notes to patient:  NEXT DOSE DUE: 3/20 8AM   20 mg, Oral, Daily      psyllium 58.6 % packet  Commonly known as:  METAMUCIL  Notes to patient:  NEXT DOSE DUE: 3/20 8AM   1 packet, Oral, Daily             No Known Allergies      Discharge Disposition:  Home or Self Care    Discharge Diet:  Diet Order   Procedures   • Diet Regular       Discharge Activity:   Activity Instructions     Activity as Tolerated            CODE STATUS:    Code Status and Medical Interventions:   Ordered at: 03/18/20 1154     Level Of Support Discussed With:    Patient     Code Status:    CPR     Medical Interventions (Level of Support Prior to Arrest):    Full       Future Appointments   Date Time Provider Department Center   6/17/2020  8:00 AM Mely Winkler APRN [facility]   12/10/2020  3:30 PM Kameron Nolasco MD [facility] None     Follow-up Information     Carmina Awad APRN. Schedule an appointment as soon as possible for a visit in 1 week(s).    Specialty:  Family Medicine  Contact  information:  [address]  [address]  [facility] [id number] [phone]             Mely Winkler APRN Follow up.    Specialty:  Neurology  Why:  Follow up in 3 months. Carotid ultrasound in 6 months.   Contact information:  [address]  [facility] [address] [phone]                   Time Spent on Discharge:  Greater than 30 minutes      IVETTE Jo  [facility]  03/19/20  2:24 PM

## 2020-03-19 NOTE — SIGNIFICANT NOTE
03/19/20 0743   Rehab Treatment   Discipline speech language pathologist   Reason Treatment Not Performed other (see comments)  (Discussed with RN. MRI negative for acute infarct and swallow screen passed. Patient tolerating regular and thins. ST to s/o, please re-consult as appropriate.)

## 2020-03-19 NOTE — PROGRESS NOTES
"DOS: 3/19/2020  NAME: Ramirez Pena   : 1965  PCP: Carmina Awad APRN  Chief Complaint   Patient presents with   • Decreased Visual Acuity     Neurology    Subjective: No recurrence of symptoms. No headache or focal numbness/weakness. Pt seen in follow up today, however the problem is new to the examiner.      Objective:  Vital signs: /74 (BP Location: Left arm, Patient Position: Lying)   Pulse 75   Temp 97.9 °F (36.6 °C) (Oral)   Resp 18   Ht 175.3 cm (69\")   Wt 95.3 kg (210 lb)   SpO2 97%   BMI 31.01 kg/m²       General appearance: Well developed, well nourished, well groomed, alert and cooperative. Obese.  HEENT: Normocephalic.   Neck and spine: Normal range of motion. Normal alignment. No mass or tenderness.    Cardiac: Regular rate and rhythm. No murmurs.   Peripheral Vasculature: Radial pulses are equal and symmetric.  Chest Exam: Clear to auscultation bilaterally, no wheezes, no rhonchi.  Extremities: Normal, no edema.   Skin: No rashes or birthmarks.     Higher integrative function: Oriented to time, place, person, intact recent and remote memory, attention span, concentration and language. Spontaneous speech, fund of vocabulary are normal.   CN II: Normal visual fields.   CN III IV VI: Extraocular movements are full without nystagmus. Pupils are equal, round, and reactive to light.    CN V: Normal facial sensation.  CN VII: Facial movements are symmetric, no weakness.   CN VIII: Auditory acuity is normal.   CN IX & X: Symmetric palatal movement.   CN XI: Sternocleidomastoid and trapezius are normal. No weakness.   CN XII: The tongue is midline.   Motor: Normal muscle strength, bulk, and tone in upper and lower extremities. No fasciculations, rigidity, spasticity or abnormal movements.   Sensation: Normal light touch.  Station and gait: N/A  Muscle stretch reflexes: Reflexes are normal and symmetric in the upper and lower extremities. Plantar reflexes are flexor bilaterally. "   Coordination: Finger to nose test showed no dysmetria. Rapid alternating movements were normal. Heel to shin normal.     Scheduled Meds:  aspirin 325 mg Oral Daily   Or      aspirin 300 mg Rectal Daily   atorvastatin 80 mg Oral Nightly   clopidogrel 75 mg Oral Daily   sodium chloride 10 mL Intravenous Q12H     Continuous Infusions:   PRN Meds:.sodium chloride  •  sodium chloride    Laboratory results:  Lab Results   Component Value Date    GLUCOSE 85 03/19/2020    CALCIUM 8.8 03/19/2020     (L) 03/19/2020    K 3.8 03/19/2020    CO2 21.9 (L) 03/19/2020    CL 99 03/19/2020    BUN 8 03/19/2020    CREATININE 0.71 (L) 03/19/2020    EGFRIFAFRI 100 03/18/2020    EGFRIFNONA 116 03/19/2020    BCR 11.3 03/19/2020    ANIONGAP 12.1 03/19/2020     Lab Results   Component Value Date    WBC 5.48 03/19/2020    HGB 14.6 03/19/2020    HCT 41.8 03/19/2020    MCV 89.5 03/19/2020     03/19/2020     Lab Results   Component Value Date    CHOL 167 03/19/2020     Lab Results   Component Value Date    HDL 33 (L) 03/19/2020    HDL 38 (L) 03/18/2020    HDL 44 03/21/2017     Lab Results   Component Value Date     (H) 03/19/2020     (H) 03/18/2020     (H) 03/21/2017     Lab Results   Component Value Date    TRIG 108 03/19/2020    TRIG 115 03/18/2020    TRIG 174 (H) 03/21/2017     ECG tracings viewed by me, shows NSR    Review and interpretation of imaging: MRI brain images viewed by me, no acute findings.   CONTRAST-ENHANCED CT ANGIOGRAM OF THE HEAD AND NECK 03/18/2020     CLINICAL HISTORY: Stroke, vision change, vision loss in right eye     TECHNIQUE: Spiral CT images were obtained from the base of the skull to  the vertex both pre and post intravenous contrast images were  reformatted and submitted in 3 mm thick axial CT section with brain  algorithm and 2 mm thick sagittal and coronal reconstructions were  performed and submitted in brain algorithm. Additional spiral CT images  were obtained from the top of  the aortic arch up through the great  vessels of the head and neck during arterial phase of contrast and  images were reformatted and submitted in 1 mm thick axial sagittal and  coronal CT sections and additional 3D reconstructions were performed to  complete the CT angiogram of the head and neck.     FINDINGS:  HEAD CT: The brain parenchyma is normal in attenuation. The ventricles  are normal in size. I see no focal mass effect and no midline shift and  no extra-axial fluid collections are identified and there is no evidence  of acute intracranial hemorrhage. No abnormal areas of enhancement are  seen in the head. The paranasal sinuses and mastoid air cells and middle  ear cavities are clear. The calvarium and skull base are normal in  appearance.     CT ANGIOGRAM OF THE NECK: The nasopharynx, oropharynx, hypopharynx, true  cords and subglottic airway are normal in appearance. Thyroid gland  enhances homogeneously and is normal in appearance. The lung apices are  clear. The parotid, , parapharyngeal and submandibular spaces  are symmetric and are normal in appearance. There is anatomic origin of  the great vessels off the aortic arch. The left subclavian artery origin  is normal in appearance. No stenosis is seen in the left subclavian  artery. There is mixed calcified and noncalcified plaque that mildly  narrows the origin of the left vertebral artery beyond its origin. The  left vertebral artery is widely patent without stenosis to the  vertebrobasilar junction. The left common carotid origin is normal in  appearance. No stenosis is seen in the left common carotid artery. There  is mixed calcified and noncalcified plaque involving posterior lateral  wall. The left common carotid bifurcation extends into the origin of the  left internal carotid artery but there is no stenosis in the left  internal carotid artery using the NASCET criteria. The brachiocephalic  artery origin is normal in appearance and no  stenosis seen in the  brachycephalic artery and its bifurcation into the right subclavian and  common carotid arteries is normal in appearance. No stenosis is seen in  the right subclavian artery. The right vertebral artery origin is normal  in appearance and no stenosis is seen in the right vertebral artery from  its origin to the vertebrobasilar junction. The right common carotid  origin is normal in appearance and no stenosis is seen in the right  common carotid artery. There is mixed calcified and noncalcified plaque  involving the posterior wall of the right common carotid bifurcation  extending into the posterior aspect of the origin of the right internal  carotid artery. It is best seen on sagittal reconstructed images 64  through 68. It is a very focal 1 mm long weblike stenosis at the origin  of the right internal carotid artery that measures up to 50% using the  NASCET criteria. No additional stenosis is seen in the right internal  carotid artery.     CT ANGIOGRAM OF THE HEAD: CT angiographic images of the head demonstrate  normal appearance of the intracranial segments of the distal vertebral  arteries and normal appearance of the basilar artery and basilar tip as  well as the posterior cerebral and superior cerebellar arteries  bilaterally. The upper cervical, petrous, cavernous and supracavernous  segments of the internal carotid arteries are normal in appearance. The  anterior and middle cerebral arteries are normal in appearance. The  anterior communicating artery origin and middle cerebral artery  trifurcations are normal in appearance.     IMPRESSION:  1. Calcified plaque mildly narrows the left vertebral artery origin.  2. Mixed calcified and noncalcified plaque extends from the posterior  wall of the right common carotid artery into the posterior aspect of the  origin of the right internal carotid artery and very focally results in  a 1 mm long very focal up to 50% stenosis of the origin of the  right  internal carotid artery using the NASCET criteria.  3. There is mild cervical spondylosis, posterior endplate spurs and  uncovertebral joint hypertrophy contributing to mild canal and mild  right and moderate left foraminal narrowing at C5-6 and up to  mild-to-moderate canal and moderate to severe left foraminal narrowing  at C6-7. The remainder of the CT angiogram of the head and neck is  normal.           Radiation dose reduction techniques were utilized, including automated  exposure control and exposure modulation based on body size.     This report was finalized on 3/18/2020 4:55 PM by Dr. Israel Menezes M.D.     MRI OF THE BRAIN WITHOUT CONTRAST 03/18/2020     CLINICAL HISTORY: Stroke. The patient had vision loss in right eye that  started Monday, 03/16/2020.     TECHNIQUE: Axial T1, FLAIR, fat-suppressed T2, axial diffusion and  gradient echo T2 and sagittal T1-weighted images were obtained of the  entire head.     This is correlated to the CT angiogram of the head and neck performed  earlier today 03/18/2020 at 1:30 PM. There are no prior MRIs of the head  for comparison.     FINDINGS: There are 2 adjacent 10 x 2 mm old posterior inferior medial  right cerebellar infarcts, an additional 15 x 3 mm old posterior lateral  right cerebellar infarct and these 3 old infarcts are in the right PICA  territory. There is very minimal hazy T2 high signal in the  periventricular white matter. A tiny 2 mm nodular focus in the superior  left frontal subcortical white matter consistent with very minimal small  vessel disease. The remainder of the brain parenchyma is normal in  signal intensity. Specifically no diffusion-weighted abnormality seen  with no acute infarct identified. On the gradient echo T2 weighted  images no acute or old blood breakdown products are seen intracranially.  The ventricles are normal in size. I see no focal mass effect. There is  no midline shift. No extra axial fluid collections are  identified.  Paranasal sinuses and the mastoid air cells and middle ear cavities are  clear. Good flow voids are demonstrated within the cerebral vessels and  in the dural venous sinuses. The calvarium and skull base demonstrate  normal marrow signal intensity. The orbits are unremarkable.     IMPRESSION:  1. There are 3 separate small old posterior inferior right cerebellar  infarcts in the right PICA territory; otherwise, normal MRI of the brain  with no acute infarcts seen.     This report was finalized on 3/18/2020 7:03 PM by Dr. Israel Menezes M.D.     Echocardiogram Findings     Left Ventricle Left ventricular systolic function is normal. Calculated EF = 66.0%. Estimated EF was in agreement with the calculated EF. Normal left ventricular cavity size and wall thickness noted. All left ventricular wall segments contract normally. Left ventricular diastolic function is normal.   Right Ventricle Normal right ventricular cavity size and systolic function noted.   Left Atrium Normal left atrial size noted. Saline test results are negative.   Right Atrium Normal right atrial size noted.   Aortic Valve The aortic valve is structurally normal. The valve appears trileaflet. No aortic valve regurgitation is present. No aortic valve stenosis is present.   Mitral Valve The mitral valve is normal in structure. Trace mitral valve regurgitation is present. No significant mitral valve stenosis is present.   Tricuspid Valve The tricuspid valve is normal. No evidence of tricuspid valve stenosis is present. Trace tricuspid valve regurgitation is present. Estimated right ventricular systolic pressure from tricuspid regurgitation is normal (<35 mmHg). No evidence of pulmonary hypertension is present.   Pulmonic Valve The pulmonic valve is grossly normal in structure. There is no significant pulmonic valve stenosis present. There is trace pulmonic valve regurgitation present.   Greater Vessels No dilation of the aortic root is  present.   Pericardium There is no evidence of pericardial effusion.       Impression:  Patient is a 55 yo male with ROBYN on CPAP who was admitted 3/18 after an episode of transient monocular vision loss in the R eye that lasted about 10 min. No associated H/a, scotoma, photophobia, or nausea. No focal weakness/numbness. Occasionally smokes a cigar, otherwise no tobacco. His mother had bilateral carotid stenosis requiring CEAs.     Diagnoses:  Symptomatic R carotid stenosis   R amaurosis fugax  ROBYN, treated  DM and HLP- new diagnoses  CTA h/n: calcified plaque with mild left vert narrowing, R CCA/ICA with mixed calcified/noncalcified plaque resulting in 50 % R ICA stenosis.   MRI brain WO: no acute findings, however there are 3 old R PICA strokes.  2d echoL EF 66%, Normal LA size, saline test results are negative.   Labs: hgb a1c 7.1%, TSH 2.48, /112    Plan:   mg and plavix 75 mg daily started, continue DAPT x 30 days then stop aspirin  Lipitor 80 mg started this admission  Diabetic educator saw pt for new dx DM, patient to f/u with PCP regarding this   Needs Q6 mo carotid u/s and annual f/u for carotid disease  Neurochecks  BP control- goal < 130/80, patient instructed to monitor at home   Stroke Education  ESTEFANÍA/SCDs  PT/OT/ST  Will arrange f/u with me in office. D/W Dr Champagne and RN today. Will sign off, please call if questions/concerns.

## 2020-03-19 NOTE — PLAN OF CARE
Problem: Patient Care Overview  Goal: Plan of Care Review  Outcome: Ongoing (interventions implemented as appropriate)  Flowsheets (Taken 3/19/2020 9017)  Progress: improving  Plan of Care Reviewed With: patient  Outcome Summary: VSS. NIH 0. Patients only complaint overnight was feeling stopped up/not being able to breath from left nostril d/t being stopped up. Patient with hx of ROBYN, CPAP machine not here, 2L oxygen placed. Possible d/c today. Will continue to monitor.     Problem: Patient Care Overview  Goal: Individualization and Mutuality  Outcome: Ongoing (interventions implemented as appropriate)     Problem: Patient Care Overview  Goal: Discharge Needs Assessment  Outcome: Ongoing (interventions implemented as appropriate)     Problem: Patient Care Overview  Goal: Interprofessional Rounds/Family Conf  Outcome: Ongoing (interventions implemented as appropriate)     Problem: Stroke (Ischemic) (Adult)  Goal: Signs and Symptoms of Listed Potential Problems Will be Absent, Minimized or Managed (Stroke)  Outcome: Ongoing (interventions implemented as appropriate)

## 2020-03-19 NOTE — NURSING NOTE
"Diabetes Education  Assessment/Teaching    Patient Name:  Ramirez Pena  YOB: 1965  MRN: [id number]  Admit Date:  3/18/2020      Assessment Date:  3/19/2020    Most Recent Value   General Information    Referral From:  Other -stroke order set. Meet with 53 y/o at bedside.    Height  175.3 cm (69\")   Weight  95.3 kg (210 lb)   Diabetes History   What type of diabetes do you have?  Type 2. pt has not previously been dx'd w/DM. urge f/u with PCP. Current a1C >7%   Education Preferences   Barriers to Learning  --no barriers this morning.    Assessment Topics   Taking Medication - Assessment  N/A- no DM meds at this time.    Reducing Risk - Assessment  Needs education   Healthy Coping - Assessment  Competent   DM Goals            Most Recent Value   DM Education Needs   Reducing Risks  A1C testing -urge pt to f/u with PCP for f/u a1c/labs.    Physical Activity Frequency  Discussed exercise importance [advise pt to ask provider re exercise restrictions. ]   Healthy Coping  Appropriate   Discharge Plan  Home, Follow-up with PCP   Motivation  Engaged   Teaching Method  Explanation, Discussion, Handouts   Patient Response  Verbalized understanding              Electronically signed by:  Aisha Tomas, RN, BSN, CDE   03/19/20 09:57  "

## 2020-03-20 ENCOUNTER — READMISSION MANAGEMENT (OUTPATIENT)
Dept: CALL CENTER | Facility: HOSPITAL | Age: 55
End: 2020-03-20

## 2020-03-20 NOTE — OUTREACH NOTE
Prep Survey      Responses   [facility] patient discharged from?  [place]   Is LACE score < 7 ?  Yes   Eligibility  Readm Mgmt   Discharge diagnosis  Amaurosis fugax of right eye    Does the patient have one of the following disease processes/diagnoses(primary or secondary)?  Other   Does the patient have Home health ordered?  No   Is there a DME ordered?  No   Prep survey completed?  Yes          Shanita Flores RN

## 2020-04-16 NOTE — TELEPHONE ENCOUNTER
PT CALLED TO GET REFILLS OF MEDICATIONS PRESCRIBED WHILE HE WAS IN THE HOSPITAL (DISCHARGE 3-19-20).      PT HAS TWO ON HAD OF EACH RX.    PHARMACY:  [id number]  [phone]    PLEASE CALL PT WHEN PRESCRIPTIONS ARE ORDERED: [phone]; PLEASE LEAVE VM IF NO ANSWER.

## 2020-04-17 RX ORDER — ATORVASTATIN CALCIUM 80 MG/1
80 TABLET, FILM COATED ORAL NIGHTLY
Qty: 30 TABLET | Refills: 2 | Status: SHIPPED | OUTPATIENT
Start: 2020-04-17 | End: 2020-07-13

## 2020-04-17 RX ORDER — CLOPIDOGREL BISULFATE 75 MG/1
75 TABLET ORAL DAILY
Qty: 30 TABLET | Refills: 2 | Status: SHIPPED | OUTPATIENT
Start: 2020-04-17 | End: 2020-07-13

## 2020-06-11 NOTE — PROGRESS NOTES
DOS: 2020  NAME: Ramirez Pena   : 1965  PCP: Carmina Awad APRN    Chief Complaint   Patient presents with   • Transient Ischemic Attack      Referring MD: No ref. provider found    Neurological Problem and Interval History:  54 y.o. right-handed male with ROBYN on CPAP, diabetes, and hyperlipidemia who is being seen today for TIA in right carotid stenosis.  The patient was admitted to the hospital in 2020 after an episode of transient monocular vision loss in the right eye that lasted about 10 minutes.  There is no associated headache or scotoma.  There is no focal weakness or numbness.  CTA head/neck showed mild left vertebral artery narrowing and 50% stenosis in the right ICA secondary to calcified noncalcified plaque.  2D echo showed normal EF of 66%, normal LA size, and saline test results were negative.  He was diagnosed with diabetes due to hemoglobin A1c of 7.1% and was also diagnosed with hyperlipidemia with LDL of 127.  He was hypertensive during his admission but had been taking decongestant and his blood pressure seems to have improved off of the decongestant.  He was started on aspirin 325 mg, Plavix 75 mg and recommended continue taking both for 30 days and then stopping aspirin, which he has done.  He denies any bleeding issues.  He was also started on Lipitor 80 mg and has not had any issues on this.  No recurrent TIA or stroke symptoms.  He states he has been walking more but wants to get back into the gym.  He feels he is eating better but has not lost any weight.  He has not followed up with his PCP yet.    Family history is significant for chronic disease in his mother.  She did not have strokes but had visual changes and ultimately needed bilateral CEAs.        Review of Systems:        Review of Systems   Constitutional: Negative for activity change, appetite change and unexpected weight change.   HENT: Negative for facial swelling, trouble swallowing and voice  change.    Eyes: Positive for visual disturbance. Negative for photophobia and pain.   Respiratory: Positive for apnea. Negative for chest tightness, shortness of breath and wheezing.    Cardiovascular: Negative for chest pain, palpitations and leg swelling.   Gastrointestinal: Negative for abdominal pain, nausea and vomiting.   Endocrine: Negative for polydipsia and polyphagia.   Musculoskeletal: Positive for back pain. Negative for arthralgias, gait problem, joint swelling, myalgias, neck pain and neck stiffness.   Allergic/Immunologic: Positive for environmental allergies.   Neurological: Negative for dizziness, tremors, seizures, syncope, facial asymmetry, speech difficulty, weakness, light-headedness, numbness and headaches.   Hematological: Does not bruise/bleed easily.   Psychiatric/Behavioral: Negative for agitation, behavioral problems, confusion, decreased concentration, dysphoric mood, hallucinations, self-injury, sleep disturbance and suicidal ideas. The patient is not nervous/anxious and is not hyperactive.          Current Outpatient Medications:   •  atorvastatin (LIPITOR) 80 MG tablet, Take 1 tablet by mouth Every Night., Disp: 30 tablet, Rfl: 2  •  azelastine (ASTELIN) 0.1 % nasal spray, INHALE 2 SPRAYS IEN QHS, Disp: , Rfl: 6  •  clopidogrel (PLAVIX) 75 MG tablet, Take 1 tablet by mouth Daily., Disp: 30 tablet, Rfl: 2  •  fluticasone (FLONASE) 50 MCG/ACT nasal spray, 2 sprays into the nostril(s) as directed by provider Daily., Disp: , Rfl:   •  guaiFENesin ER (Mucinex Maximum Strength) 1200 MG tablet sustained-release 12 hour, Take  by mouth., Disp: , Rfl:   •  montelukast (SINGULAIR) 10 MG tablet, Take 1 tablet by mouth Every Night., Disp: 30 tablet, Rfl: 5  •  psyllium (METAMUCIL) 58.6 % packet, Take 1 packet by mouth Daily., Disp: , Rfl:   •  sodium chloride 0.65 % nasal spray, 2 sprays into the nostril(s) as directed by provider As Needed for Congestion., Disp: , Rfl: 12        Laboratory  "Results:             Lab Results   Component Value Date    HGBA1C 7.10 (H) 03/18/2020         Lab Results   Component Value Date    CHOL 167 03/19/2020         Lab Results   Component Value Date    HDL 33 (L) 03/19/2020    HDL 38 (L) 03/18/2020    HDL 44 03/21/2017         Lab Results   Component Value Date     (H) 03/19/2020     (H) 03/18/2020     (H) 03/21/2017         Lab Results   Component Value Date    TRIG 108 03/19/2020    TRIG 115 03/18/2020    TRIG 174 (H) 03/21/2017     No results found for: RPR  Lab Results   Component Value Date    TSH 2.480 03/18/2020     No results found for: [id number]  Vitals:    06/17/20 0746   BP: 110/64   BP Location: Left arm   Patient Position: Sitting   Cuff Size: Adult   Pulse: 62   SpO2: 98%   Weight: 95.3 kg (210 lb)   Height: 175.3 cm (69\")       Physical Examination: NIHSS:0  MRS:0.  PHQ-9 score was 4.  CNS-LS score was 8  General Appearance:   Well developed, well nourished, well groomed, alert, and cooperative. Obese.  HEENT: Normocephalic.    Neck and Spine: Normal range of motion.  Normal alignment. No mass or tenderness. No bruits.  Cardiac: Regular rate and rhythm. No murmurs.  Peripheral Vasculature: Radial pulses are equal and symmetric. No signs of distal embolization.  Extremities:    No edema.  Skin:    No rashes or birth marks.    Neurological examination:  Higher Integrative  Function: Oriented to time, place and person. Normal registration, recall, attention span and concentration. Normal language including comprehension, spontaneous speech, repetition, reading, writing, naming and vocabulary. No neglect.Normal fund of knowledge and higher integrative function.  CN II: Pupils are equal, round, and reactive to light. Normal visual fields.    CN III IV VI: Extraocular movements are full without nystagmus.   CN V: Normal facial sensation and strength of muscles of mastication.  CN VII: Facial movements are symmetric. No weakness.  CN " VIII:   Auditory acuity is normal.  CN IX & X:   Symmetric palatal movement.  CN XI: Sternocleidomastoid and trapezius are normal.  No weakness.  CN XII:   The tongue is midline.  No atrophy or fasciculations.  Motor: Normal muscle strength, bulk and tone in upper and lower extremities.  No fasciculations, rigidity, spasticity, or abnormal movements.  Reflexes: 2+ in the upper and 1+in the lower extremities.   Sensation: Normal to light touch in arms and legs.   Station and Gait: Normal gait and station.    Coordination: Finger to nose test shows no dysmetria.  Rapid alternating movements are normal.  Heel to shin normal.    Diagnoses / Discussion:    1.  Amaurosis fugax secondary to right ICA stenosis, no recurrence since hospitalization  2.  Diabetes mellitus  3.  Hyperlipidemia  4.  ROBYN, compliant with CPAP  5.  Obesity  Plan:   Continue Plavix 75 mg daily   Check CMP, lipid panel, and hemoglobin A1c   Check carotid ultrasound in September 2020, follow-up in December 2020   Blood pressure control to <130/80   Goal LDL <70-recommend high dose statins-, continue Lipitor 80 mg    Serum glucose < 140   Patient recommended to follow-up with PCP   Patient encouraged to continue increased activity and healthy diet including decreased carbs   Call 911 for stroke any stroke symptoms    Ramirez was seen today for transient ischemic attack.    Diagnoses and all orders for this visit:    Stenosis of right carotid artery  -     Comprehensive Metabolic Panel; Future  -     Lipid Panel; Future    Type 2 diabetes mellitus with hypoglycemia without coma, without long-term current use of insulin (CMS/[facility])  -     Hemoglobin A1c; Future

## 2020-06-17 ENCOUNTER — OFFICE VISIT (OUTPATIENT)
Dept: NEUROLOGY | Facility: CLINIC | Age: 55
End: 2020-06-17

## 2020-06-17 ENCOUNTER — LAB (OUTPATIENT)
Dept: LAB | Facility: HOSPITAL | Age: 55
End: 2020-06-17

## 2020-06-17 VITALS
HEART RATE: 62 BPM | SYSTOLIC BLOOD PRESSURE: 110 MMHG | HEIGHT: 69 IN | BODY MASS INDEX: 31.1 KG/M2 | DIASTOLIC BLOOD PRESSURE: 64 MMHG | OXYGEN SATURATION: 98 % | WEIGHT: 210 LBS

## 2020-06-17 DIAGNOSIS — I65.21 STENOSIS OF RIGHT CAROTID ARTERY: Primary | ICD-10-CM

## 2020-06-17 DIAGNOSIS — I65.21 STENOSIS OF RIGHT CAROTID ARTERY: ICD-10-CM

## 2020-06-17 DIAGNOSIS — E11.649 TYPE 2 DIABETES MELLITUS WITH HYPOGLYCEMIA WITHOUT COMA, WITHOUT LONG-TERM CURRENT USE OF INSULIN (HCC): ICD-10-CM

## 2020-06-17 LAB
ALBUMIN SERPL-MCNC: 4.5 G/DL (ref 3.5–5.2)
ALBUMIN/GLOB SERPL: 2.1 G/DL
ALP SERPL-CCNC: 49 U/L (ref 39–117)
ALT SERPL W P-5'-P-CCNC: 27 U/L (ref 1–41)
ANION GAP SERPL CALCULATED.3IONS-SCNC: 9.8 MMOL/L (ref 5–15)
AST SERPL-CCNC: 18 U/L (ref 1–40)
BILIRUB SERPL-MCNC: 0.5 MG/DL (ref 0.2–1.2)
BUN BLD-MCNC: 11 MG/DL (ref 6–20)
BUN/CREAT SERPL: 12.4 (ref 7–25)
CALCIUM SPEC-SCNC: 8.8 MG/DL (ref 8.6–10.5)
CHLORIDE SERPL-SCNC: 103 MMOL/L (ref 98–107)
CHOLEST SERPL-MCNC: 86 MG/DL (ref 0–200)
CO2 SERPL-SCNC: 24.2 MMOL/L (ref 22–29)
CREAT BLD-MCNC: 0.89 MG/DL (ref 0.76–1.27)
GFR SERPL CREATININE-BSD FRML MDRD: 89 ML/MIN/1.73
GLOBULIN UR ELPH-MCNC: 2.1 GM/DL
GLUCOSE BLD-MCNC: 102 MG/DL (ref 65–99)
HBA1C MFR BLD: 5.8 % (ref 4.8–5.6)
HDLC SERPL-MCNC: 34 MG/DL (ref 40–60)
LDLC SERPL CALC-MCNC: 41 MG/DL (ref 0–100)
LDLC/HDLC SERPL: 1.2 {RATIO}
POTASSIUM BLD-SCNC: 3.9 MMOL/L (ref 3.5–5.2)
PROT SERPL-MCNC: 6.6 G/DL (ref 6–8.5)
SODIUM BLD-SCNC: 137 MMOL/L (ref 136–145)
TRIGL SERPL-MCNC: 56 MG/DL (ref 0–150)
VLDLC SERPL-MCNC: 11.2 MG/DL (ref 5–40)

## 2020-06-17 PROCEDURE — 83036 HEMOGLOBIN GLYCOSYLATED A1C: CPT

## 2020-06-17 PROCEDURE — 80053 COMPREHEN METABOLIC PANEL: CPT

## 2020-06-17 PROCEDURE — 80061 LIPID PANEL: CPT

## 2020-06-17 PROCEDURE — 36415 COLL VENOUS BLD VENIPUNCTURE: CPT

## 2020-06-17 PROCEDURE — 99214 OFFICE O/P EST MOD 30 MIN: CPT | Performed by: NURSE PRACTITIONER

## 2020-06-17 RX ORDER — GUAIFENESIN 1200 MG/1
TABLET, EXTENDED RELEASE ORAL
COMMUNITY
End: 2021-04-02

## 2020-06-17 RX ORDER — ASPIRIN 81 MG/1
81 TABLET ORAL DAILY
COMMUNITY
End: 2020-06-17

## 2020-06-19 ENCOUNTER — TELEPHONE (OUTPATIENT)
Dept: NEUROLOGY | Facility: CLINIC | Age: 55
End: 2020-06-19

## 2020-06-19 NOTE — TELEPHONE ENCOUNTER
----- Message from IVETTE Neri sent at 6/19/2020  2:43 PM EDT -----  Please let patient know his LDL is good, liver function tests look good as well, continue lipitor 80 mg. His hemoglobin a1c has improved to the prediabetic range at 5.8%, (prediabetic 5.8-6.4%) so keep up the good work with diet changes!

## 2020-07-13 RX ORDER — CLOPIDOGREL BISULFATE 75 MG/1
75 TABLET ORAL DAILY
Qty: 30 TABLET | Refills: 11 | Status: SHIPPED | OUTPATIENT
Start: 2020-07-13 | End: 2021-01-21

## 2020-07-13 RX ORDER — ATORVASTATIN CALCIUM 80 MG/1
80 TABLET, FILM COATED ORAL NIGHTLY
Qty: 30 TABLET | Refills: 11 | Status: SHIPPED | OUTPATIENT
Start: 2020-07-13 | End: 2021-01-04 | Stop reason: SDUPTHER

## 2020-09-15 ENCOUNTER — TELEPHONE (OUTPATIENT)
Dept: NEUROLOGY | Facility: CLINIC | Age: 55
End: 2020-09-15

## 2020-09-15 NOTE — TELEPHONE ENCOUNTER
Patient called and stated that he changed insurances recently and now I order for his Duplex Carotid test if he can get a PA for this test that the insurance company is telling him that it will be 100% covered.     Can someone look into this and contact patient and advise?       Ramirez Pena   [phone]

## 2020-09-21 ENCOUNTER — HOSPITAL ENCOUNTER (OUTPATIENT)
Dept: CARDIOLOGY | Facility: HOSPITAL | Age: 55
Discharge: HOME | End: 2020-09-21
Admitting: NURSE PRACTITIONER

## 2020-09-21 ENCOUNTER — DOCUMENTATION (OUTPATIENT)
Dept: NEUROLOGY | Facility: CLINIC | Age: 55
End: 2020-09-21

## 2020-09-21 DIAGNOSIS — I65.21 STENOSIS OF RIGHT CAROTID ARTERY: ICD-10-CM

## 2020-09-21 LAB
BH CV XLRA MEAS LEFT DIST CCA EDV: -34.3 CM/SEC
BH CV XLRA MEAS LEFT DIST CCA PSV: -90.4 CM/SEC
BH CV XLRA MEAS LEFT DIST ICA EDV: -38.8 CM/SEC
BH CV XLRA MEAS LEFT DIST ICA PSV: -69.3 CM/SEC
BH CV XLRA MEAS LEFT ICA/CCA RATIO: 1
BH CV XLRA MEAS LEFT MID ICA EDV: -38.3 CM/SEC
BH CV XLRA MEAS LEFT MID ICA PSV: -80 CM/SEC
BH CV XLRA MEAS LEFT PROX CCA EDV: 37.8 CM/SEC
BH CV XLRA MEAS LEFT PROX CCA PSV: 105.8 CM/SEC
BH CV XLRA MEAS LEFT PROX ECA EDV: -30.8 CM/SEC
BH CV XLRA MEAS LEFT PROX ECA PSV: -105.1 CM/SEC
BH CV XLRA MEAS LEFT PROX ICA EDV: -33.1 CM/SEC
BH CV XLRA MEAS LEFT PROX ICA PSV: -74 CM/SEC
BH CV XLRA MEAS LEFT PROX SCLA PSV: 98.8 CM/SEC
BH CV XLRA MEAS LEFT VERTEBRAL A EDV: -21.6 CM/SEC
BH CV XLRA MEAS LEFT VERTEBRAL A PSV: -52.5 CM/SEC
BH CV XLRA MEAS RIGHT DIST CCA EDV: -34.8 CM/SEC
BH CV XLRA MEAS RIGHT DIST CCA PSV: -77.7 CM/SEC
BH CV XLRA MEAS RIGHT DIST ICA EDV: -32 CM/SEC
BH CV XLRA MEAS RIGHT DIST ICA PSV: -73.8 CM/SEC
BH CV XLRA MEAS RIGHT ICA/CCA RATIO: 2
BH CV XLRA MEAS RIGHT MID ICA EDV: -28.5 CM/SEC
BH CV XLRA MEAS RIGHT MID ICA PSV: -63.7 CM/SEC
BH CV XLRA MEAS RIGHT PROX CCA EDV: -27.3 CM/SEC
BH CV XLRA MEAS RIGHT PROX CCA PSV: -75.8 CM/SEC
BH CV XLRA MEAS RIGHT PROX ECA EDV: -23 CM/SEC
BH CV XLRA MEAS RIGHT PROX ECA PSV: -98.2 CM/SEC
BH CV XLRA MEAS RIGHT PROX ICA EDV: -69.8 CM/SEC
BH CV XLRA MEAS RIGHT PROX ICA PSV: -146.6 CM/SEC
BH CV XLRA MEAS RIGHT PROX SCLA PSV: 91.1 CM/SEC
BH CV XLRA MEAS RIGHT VERTEBRAL A EDV: -16.5 CM/SEC
BH CV XLRA MEAS RIGHT VERTEBRAL A PSV: -45.1 CM/SEC
LEFT ARM BP: NORMAL MMHG
RIGHT ARM BP: NORMAL MMHG

## 2020-09-21 PROCEDURE — 93880 EXTRACRANIAL BILAT STUDY: CPT

## 2020-09-21 NOTE — PROGRESS NOTES
Carotid ultrasound results, below, were reviewed with the patient via telephone.  Right ICA stenosis is stable.  We will plan on rechecking carotid ultrasound in 1 year.  Verbalized understanding.    Study Findings    •     Right CCA Prox:  No plaque visualized.    •     Right CCA Dist:  No plaque visualized.   •     Right ICA Prox:  Plaque present.   •     Right ECA:  No plaque visualized.   •     Right Vertebral:  Antegrade flow noted.       •     Left CCA Prox:  No plaque visualized.   •     Left CCA Dist:  Plaque present.   •     Left ICA Prox:  Plaque present.   •     Left ECA:  No plaque visualized.   •     Left Vertebral:  Antegrade flow noted.   Study Impression •     Right ICA Prox:  Imaging indicates 50-59% stenosis.        •     Left ICA Prox:  Imaging indicates 16-49% stenosis.

## 2020-11-11 ENCOUNTER — FLU SHOT (OUTPATIENT)
Dept: FAMILY MEDICINE CLINIC | Facility: CLINIC | Age: 55
End: 2020-11-11

## 2020-11-11 DIAGNOSIS — Z23 NEED FOR VACCINATION: Primary | ICD-10-CM

## 2020-11-11 PROCEDURE — 90686 IIV4 VACC NO PRSV 0.5 ML IM: CPT | Performed by: NURSE PRACTITIONER

## 2020-11-11 PROCEDURE — 90471 IMMUNIZATION ADMIN: CPT | Performed by: NURSE PRACTITIONER

## 2020-12-08 NOTE — PROGRESS NOTES
DOS: 2020  NAME: Ramirez Pena   : 1965  PCP: Carmina Awad APRN       CC: TIA, carotid stenosis follow up    Neurological Problem and Interval History:  55 y.o. right-handed male with HLD, DM, and ROBYN who is being seen in follow-up for TIA and carotid stenosis.    The following was reviewed for accuracy:  The patient was admitted to the hospital in 2020 after an episode of transient monocular vision loss in the right eye that lasted about 10 minutes.  There is no associated headache or scotoma.  There is no focal weakness or numbness.  CTA head/neck showed mild left vertebral artery narrowing and 50% stenosis in the right ICA secondary to calcified noncalcified plaque.  2D echo showed normal EF of 66%, normal LA size, and saline test results were negative.  He was diagnosed with diabetes due to hemoglobin A1c of 7.1% and was also diagnosed with hyperlipidemia with LDL of 127.  He was hypertensive during his admission but had been taking decongestant and his blood pressure seems to have improved off of the decongestant.  He was started on aspirin 325 mg, Plavix 75 mg and recommended continue taking both for 30 days and then stopping aspirin.  He was also started on Lipitor 80 mg and has not had any issues on this.       Interim history:  Carotid ultrasound completed in 2020 showedRight ICA 50 to 59% stenosis and left ICA 16 to 49% stenosis.  No new s/sx of TIA-stroke.  He continues to take Plavix 75 mg daily and atorvastatin 80 mg daily.  He is CPAP compliant.  In the last couple of days he has developed ankle swelling, initially worse on the right however today it is worse on the left.  He denies any known injuries.  No calf pain.  He does note he has been more sedentary with a recent job change.  No shortness of air.  He also notes a lump in his left groin which is painful, it typically occurs when he has gas or needs to have a bowel movement and then resolves  after.    Family history significant for carotid disease in his mother.  She did not have strokes but had visual changes and ultimately needed bilateral CEAs.    Review of Systems:        Review of Systems   Constitutional: Negative for activity change, appetite change and unexpected weight change.   HENT: Negative for facial swelling, trouble swallowing and voice change.    Eyes: Negative for photophobia, pain and visual disturbance.   Respiratory: Negative for chest tightness, shortness of breath and wheezing.    Cardiovascular: Positive for leg swelling (ankles). Negative for chest pain and palpitations.   Gastrointestinal: Positive for abdominal pain. Negative for nausea and vomiting.   Endocrine: Negative for cold intolerance and heat intolerance.   Musculoskeletal: Negative for arthralgias, back pain, gait problem, joint swelling, myalgias, neck pain and neck stiffness.   Neurological: Negative for dizziness, tremors, seizures, syncope, facial asymmetry, speech difficulty, weakness, light-headedness, numbness and headaches.   Hematological: Does not bruise/bleed easily.   Psychiatric/Behavioral: Negative for agitation, behavioral problems, confusion, decreased concentration, dysphoric mood, hallucinations, self-injury, sleep disturbance and suicidal ideas. The patient is not nervous/anxious and is not hyperactive.          Current Outpatient Medications:   •  atorvastatin (LIPITOR) 80 MG tablet, Take 1 tablet by mouth Every Night., Disp: 30 tablet, Rfl: 11  •  azelastine (ASTELIN) 0.1 % nasal spray, INHALE 2 SPRAYS IEN QHS, Disp: , Rfl: 6  •  azelastine (ASTELIN) 0.1 % nasal spray, Use 2 sprays into each nostril twice daily, Disp: 90 mL, Rfl: 3  •  clopidogrel (PLAVIX) 75 MG tablet, Take 1 tablet by mouth Daily., Disp: 30 tablet, Rfl: 11  •  fluticasone (FLONASE) 50 MCG/ACT nasal spray, 2 sprays into the nostril(s) as directed by provider Daily., Disp: , Rfl:   •  guaiFENesin ER (Mucinex Maximum Strength) 1200  MG tablet sustained-release 12 hour, Take  by mouth., Disp: , Rfl:   •  montelukast (SINGULAIR) 10 MG tablet, Take 1 tablet by mouth Every Night., Disp: 30 tablet, Rfl: 5  •  montelukast (SINGULAIR) 10 MG tablet, Take 1 tablet by mouth Daily., Disp: 90 tablet, Rfl: 3  •  multivitamin (MULTI-VITAMIN PO), Take  by mouth Daily., Disp: , Rfl:   •  psyllium (METAMUCIL) 58.6 % packet, Take 1 packet by mouth Daily., Disp: , Rfl:   •  sodium chloride 0.65 % nasal spray, 2 sprays into the nostril(s) as directed by provider As Needed for Congestion., Disp: , Rfl: 12      Review and Interpretation of Imaging:  Study date: 20   Patient Information    Patient Name   Ramirez Pena MRN   [id number] Sex   Male  (Age)   1965 (55 y.o.)   Clinical Indication    Symptomatic R carotid stenosis   Dx: Stenosis of right carotid artery [I65.21 (ICD-10-CM)]   Comments:    Interpretation Summary    · Proximal right internal carotid artery moderate stenosis.  · Proximal left internal carotid artery mild stenosis.      Patient Hx Of Height, Weight, and Vitals    Height Weight BSA (Calculated - sq m) BMI (kg/m2) Pulse BP           Study Findings    •     Right CCA Prox:  No plaque visualized.    •     Right CCA Dist:  No plaque visualized.   •     Right ICA Prox:  Plaque present.   •     Right ECA:  No plaque visualized.   •     Right Vertebral:  Antegrade flow noted.       •     Left CCA Prox:  No plaque visualized.   •     Left CCA Dist:  Plaque present.   •     Left ICA Prox:  Plaque present.   •     Left ECA:  No plaque visualized.   •     Left Vertebral:  Antegrade flow noted.   Study Impression •     Right ICA Prox:  Imaging indicates 50-59% stenosis.        •     Left ICA Prox:  Imaging indicates 16-49% stenosis.       Laboratory Results:             Lab Results   Component Value Date    HGBA1C 5.80 (H) 2020         Lab Results   Component Value Date    CHOL 86 2020    CHOL 167 2020         Lab Results  "  Component Value Date    HDL 34 (L) 06/17/2020    HDL 33 (L) 03/19/2020    HDL 38 (L) 03/18/2020         Lab Results   Component Value Date    LDL 41 06/17/2020     (H) 03/19/2020     (H) 03/18/2020         Lab Results   Component Value Date    TRIG 56 06/17/2020    TRIG 108 03/19/2020    TRIG 115 03/18/2020     No results found for: RPR  Lab Results   Component Value Date    TSH 2.480 03/18/2020     No results found for: [id number]  Vitals:    12/17/20 0805   BP: 142/98   BP Location: Left arm   Patient Position: Sitting   Cuff Size: Adult   Pulse: 64   SpO2: 98%   Weight: 97.5 kg (215 lb)   Height: 177.8 cm (70\")       Physical Examination:   General Appearance:   Well developed, well nourished, well groomed, alert, and cooperative.  HEENT: Normocephalic.  Neck and Spine: Normal range of motion.  Normal alignment. No mass or tenderness. No bruits.  Cardiac: Regular rate and rhythm. No murmurs.  Peripheral Vasculature: Radial pulses are equal and symmetric. No signs of distal embolization.  Extremities:    Bilateral lower extremity nonpitting edema, greater in the left ankle.  Normal joint ROM.  Skin:    No rashes or birth marks.    Neurological examination:  Higher Integrative  Function: Oriented to time, place and person. Normal registration, recall, attention span and concentration. Normal language including comprehension, spontaneous speech, naming and vocabulary. No neglect. Normal fund of knowledge and higher integrative function.  CN II: Pupils are equal, round, and reactive to light. Normal visual fields.    CN III IV VI: Extraocular movements are full without nystagmus.   CN V: Normal facial sensation and strength of muscles of mastication.  CN VII: Facial movements are symmetric. No weakness.  CN VIII:   Auditory acuity is normal.  CN IX & X:   Symmetric palatal movement.  CN XI: Sternocleidomastoid and trapezius are normal.  No weakness.  CN XII:   The tongue is midline.  No atrophy or " fasciculations.  Motor: Normal muscle strength, bulk and tone in upper and lower extremities.  No fasciculations, rigidity, spasticity, or abnormal movements.  Reflexes: 2+ in the upper and lower extremities.  Sensation: Normal to light touch.  Station and Gait: Normal gait and station.    Coordination: Finger to nose test shows no dysmetria.  Rapid alternating movements are normal.  Heel to shin normal.    Diagnoses / Discussion:    Episode of right amaurosis fugax secondary to right carotid stenosis  Acute bilateral lower extremity edema  Abdominal pain    Plan:   Check bilateral lower extremity venous ultrasound for DVT   Suspect he has an unguinal hernia, patient instructed to follow-up with his PCP   Continue Plavix 75 mg daily   Follow-up carotid ultrasound in June 2021 then annually after that.   Blood pressure control to <130/80   Goal LDL <70-recommend high dose statins-continue atorvastatin 80 mg    Serum glucose < 140 goal hemoglobin A1c less than 6.5%   Call 911 for stroke any stroke symptoms   Follow-up in June 2021  Diagnoses and all orders for this visit:    1. Localized edema (Primary)  -     Duplex Venous Lower Extremity - Bilateral CAR; Future    2. Stenosis of right carotid artery  -     Duplex Carotid Ultrasound CAR; Future      Addendum 12/17 1220: BLE venous u/s negative for DVT. Patient notified of results over the phone. He was instructed to follow up with his PCP re: his new ankle swelling.

## 2020-12-10 ENCOUNTER — OFFICE VISIT (OUTPATIENT)
Dept: SLEEP MEDICINE | Facility: HOSPITAL | Age: 55
End: 2020-12-10

## 2020-12-10 VITALS
BODY MASS INDEX: 30.49 KG/M2 | SYSTOLIC BLOOD PRESSURE: 131 MMHG | HEIGHT: 70 IN | OXYGEN SATURATION: 98 % | WEIGHT: 213 LBS | HEART RATE: 69 BPM | DIASTOLIC BLOOD PRESSURE: 72 MMHG

## 2020-12-10 DIAGNOSIS — E66.09 CLASS 1 OBESITY DUE TO EXCESS CALORIES WITHOUT SERIOUS COMORBIDITY WITH BODY MASS INDEX (BMI) OF 31.0 TO 31.9 IN ADULT: ICD-10-CM

## 2020-12-10 DIAGNOSIS — Z99.89 OSA ON CPAP: Primary | ICD-10-CM

## 2020-12-10 DIAGNOSIS — G47.33 OSA ON CPAP: Primary | ICD-10-CM

## 2020-12-10 PROBLEM — E66.811 CLASS 1 OBESITY DUE TO EXCESS CALORIES WITHOUT SERIOUS COMORBIDITY WITH BODY MASS INDEX (BMI) OF 31.0 TO 31.9 IN ADULT: Status: ACTIVE | Noted: 2020-12-10

## 2020-12-10 PROCEDURE — 99214 OFFICE O/P EST MOD 30 MIN: CPT | Performed by: INTERNAL MEDICINE

## 2020-12-10 PROCEDURE — G0463 HOSPITAL OUTPT CLINIC VISIT: HCPCS

## 2020-12-10 NOTE — PROGRESS NOTES
[facility]  [facility]    SLEEP CLINIC FOLLOW UP PROGRESS NOTE.    Ramirez Pena  1965  55 y.o.  male      PCP: Carmina Awad APRN      Date of visit: 12/10/2020    Chief Complaint   Patient presents with   • Sleep Apnea   • Follow-up       INTERM HISTORY:  This is a 55 y.o. years old patient who has a history of sleep apnea and is on CPAP.  Patient reports good compliance with the device.  Patient has moderate sleep apnea with AHI of 12.5 and the using CPAP on a regular basis.  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  He says that he had transient vision loss in the right eye and now is on Plavix and Lipitor.  His eyesight is back.    Sleep schedule  Normally goes to bed at 10:30 PM  Wakes up at 6 AM  Do you feel refreshed after waking up ?:  Yes      The Smart card downloaded on 12/10/2020 has been reviewed by me and shows the following..  Compliance; 100%  > 4 hr use, 97%  Average use of the device 7 hours and 20 minutes per night  Residual AHI: 1.4 /hr (normal less than 5)  Mask type: Nasal pillows  DME: [employer]      Past Medical History:   Diagnosis Date   • Amaurosis fugax of right eye 3/18/2020   • Carotid stenosis 3/19/2020   • Class 1 obesity due to excess calories without serious comorbidity with body mass index (BMI) of 31.0 to 31.9 in adult 12/10/2020   • Depression    • ROBYN on CPAP     AHI 12.5   • Patient had no falls in past year    • Type 2 diabetes mellitus (CMS/[facility]) 3/19/2020       MEDICATIONS: reviewed by me    Current Outpatient Medications:   •  atorvastatin (LIPITOR) 80 MG tablet, TAKE 1 TABLET BY MOUTH EVERY NIGHT, Disp: 30 tablet, Rfl: 11  •  azelastine (ASTELIN) 0.1 % nasal spray, INHALE 2 SPRAYS IEN QHS, Disp: , Rfl: 6  •  clopidogrel (PLAVIX) 75 MG tablet, TAKE 1 TABLET BY MOUTH DAILY, Disp: 30 tablet, Rfl: 11  •  fluticasone (FLONASE) 50 MCG/ACT nasal spray, 2 sprays into the nostril(s) as directed by provider Daily., Disp: ,  "Rfl:   •  guaiFENesin ER (Mucinex Maximum Strength) 1200 MG tablet sustained-release 12 hour, Take  by mouth., Disp: , Rfl:   •  montelukast (SINGULAIR) 10 MG tablet, Take 1 tablet by mouth Every Night., Disp: 30 tablet, Rfl: 5  •  psyllium (METAMUCIL) 58.6 % packet, Take 1 packet by mouth Daily., Disp: , Rfl:   •  sodium chloride 0.65 % nasal spray, 2 sprays into the nostril(s) as directed by provider As Needed for Congestion., Disp: , Rfl: 12    No Known Allergies reviewed by me    SOCIAL, FAMILY HISTORY: Medical records are reviewed and noted by me.  History of smoking yes  History of alcohol use less than 1  Caffeine use more than 6    REVIEW OF SYSTEMS:   [place] Sleepiness Scale :Total score: 2   Snoring: Resolved  Morning headache: No  Nasal congestion: No  Leg movements: No  Number of times you wake up once asleep: 1  Heart burn no    PHYSICAL EXAMINATION:  Vitals:    12/10/20 1544   BP: 131/72   Pulse: 69   SpO2: 98%   Weight: 96.6 kg (213 lb)   Height: 177.8 cm (70\")    Body mass index is 30.56 kg/m².    HEENT: pupils are round equal and reacting to light and accommodation, nasal passage is clear, no nasal polyps, no lymphadenopathy, throat is clear, oral airway Mallampati class 3  RESPRATORY SYSTEM: Breath sounds are equal on both sides and are normal, no wheezes or crackles  CARDIOVASULAR SYSTEM: Heart rate is regular without murmur  ABDOMEN: Soft, no ascites, no hepatosplenomegaly.  EXTREMITIES: No cyanosis, clubbing or edema       ASSESSMENT AND PLAN:  · Obstructive sleep apnea, patient is using the device with good compliance for treatment of sleep apnea.  I have reviewed the smart card down load and discussed with the patient the download data and encouarged the patient to continue to use the device.  The symptoms have improved and the residual AHI is acceptable.  The device is benefiting the patient and the device is medically necessary. The patient is also instructed to get the supplies from the " [employer] company and a prescription for supplies has been sent to the DME company.    · Obesity, patient's BMI is Body mass index is 30.56 kg/m²..  I have discussed weight reduction and the health benefits.  I have also discuss the relationship between the weight and sleep apnea and encouraged the patient to lose weight which is beneficial in treating sleep apnea. Discussed diet and exercise with the patient.    · Return to clinic in 12 months for follow-up        Kameron Nolasco MD  Sleep Medicine.(Board-certified)  [facility]  Medical Director for [facility]  12/10/2020

## 2020-12-17 ENCOUNTER — OFFICE VISIT (OUTPATIENT)
Dept: NEUROLOGY | Facility: CLINIC | Age: 55
End: 2020-12-17

## 2020-12-17 ENCOUNTER — HOSPITAL ENCOUNTER (OUTPATIENT)
Dept: CARDIOLOGY | Facility: HOSPITAL | Age: 55
Discharge: HOME | End: 2020-12-17
Admitting: NURSE PRACTITIONER

## 2020-12-17 VITALS
BODY MASS INDEX: 30.78 KG/M2 | SYSTOLIC BLOOD PRESSURE: 142 MMHG | WEIGHT: 215 LBS | HEIGHT: 70 IN | DIASTOLIC BLOOD PRESSURE: 98 MMHG | OXYGEN SATURATION: 98 % | HEART RATE: 64 BPM

## 2020-12-17 DIAGNOSIS — R60.0 LOCALIZED EDEMA: ICD-10-CM

## 2020-12-17 DIAGNOSIS — G45.9 TRANSIENT ISCHEMIC ATTACK (TIA): ICD-10-CM

## 2020-12-17 DIAGNOSIS — G45.3 AMAUROSIS FUGAX OF RIGHT EYE: ICD-10-CM

## 2020-12-17 DIAGNOSIS — I65.21 STENOSIS OF RIGHT CAROTID ARTERY: Primary | ICD-10-CM

## 2020-12-17 LAB

## 2020-12-17 PROCEDURE — 93970 EXTREMITY STUDY: CPT

## 2020-12-17 PROCEDURE — 99214 OFFICE O/P EST MOD 30 MIN: CPT | Performed by: NURSE PRACTITIONER

## 2020-12-17 RX ORDER — DIPHENOXYLATE HYDROCHLORIDE AND ATROPINE SULFATE 2.5; .025 MG/1; MG/1
TABLET ORAL DAILY
COMMUNITY
End: 2021-04-02

## 2020-12-17 NOTE — PROGRESS NOTES
Bilateral leg venous Doppler preliminary report: negative for dvt in both legs. Called report to Mely Winkler APRN and her instruction is to let the patient know of prelim results.

## 2021-01-04 RX ORDER — ATORVASTATIN CALCIUM 80 MG/1
80 TABLET, FILM COATED ORAL NIGHTLY
Qty: 30 TABLET | Refills: 11 | Status: SHIPPED | OUTPATIENT
Start: 2021-01-04 | End: 2022-01-08 | Stop reason: SDUPTHER

## 2021-03-05 ENCOUNTER — IMMUNIZATION (OUTPATIENT)
Dept: VACCINE CLINIC | Facility: HOSPITAL | Age: 56
End: 2021-03-05

## 2021-03-05 PROCEDURE — 0001A: CPT | Performed by: INTERNAL MEDICINE

## 2021-03-05 PROCEDURE — 91300 HC SARSCOV02 VAC 30MCG/0.3ML IM: CPT | Performed by: INTERNAL MEDICINE

## 2021-03-26 ENCOUNTER — IMMUNIZATION (OUTPATIENT)
Dept: VACCINE CLINIC | Facility: HOSPITAL | Age: 56
End: 2021-03-26

## 2021-03-26 PROCEDURE — 0002A: CPT | Performed by: INTERNAL MEDICINE

## 2021-03-26 PROCEDURE — 91300 HC SARSCOV02 VAC 30MCG/0.3ML IM: CPT | Performed by: INTERNAL MEDICINE

## 2021-04-02 ENCOUNTER — OFFICE VISIT (OUTPATIENT)
Dept: FAMILY MEDICINE CLINIC | Facility: CLINIC | Age: 56
End: 2021-04-02

## 2021-04-02 VITALS
OXYGEN SATURATION: 98 % | HEIGHT: 70 IN | SYSTOLIC BLOOD PRESSURE: 128 MMHG | TEMPERATURE: 96.8 F | DIASTOLIC BLOOD PRESSURE: 68 MMHG | HEART RATE: 74 BPM | BODY MASS INDEX: 31.54 KG/M2 | WEIGHT: 220.3 LBS

## 2021-04-02 DIAGNOSIS — Z79.899 MEDICATION MANAGEMENT: ICD-10-CM

## 2021-04-02 DIAGNOSIS — Z12.5 SCREENING FOR PROSTATE CANCER: ICD-10-CM

## 2021-04-02 DIAGNOSIS — I78.1 TELANGIECTASIS: ICD-10-CM

## 2021-04-02 DIAGNOSIS — R19.8 CHANGE IN BOWEL MOVEMENT: ICD-10-CM

## 2021-04-02 DIAGNOSIS — Z00.00 ANNUAL PHYSICAL EXAM: Primary | ICD-10-CM

## 2021-04-02 DIAGNOSIS — Z13.29 SCREENING FOR THYROID DISORDER: ICD-10-CM

## 2021-04-02 DIAGNOSIS — Z80.0 FAMILY HISTORY OF COLON CANCER: ICD-10-CM

## 2021-04-02 DIAGNOSIS — Z12.11 SCREENING FOR COLON CANCER: ICD-10-CM

## 2021-04-02 DIAGNOSIS — Z11.3 ROUTINE SCREENING FOR STI (SEXUALLY TRANSMITTED INFECTION): ICD-10-CM

## 2021-04-02 DIAGNOSIS — E11.9 TYPE 2 DIABETES MELLITUS WITHOUT COMPLICATION, WITHOUT LONG-TERM CURRENT USE OF INSULIN (HCC): ICD-10-CM

## 2021-04-02 DIAGNOSIS — R60.0 LOWER EXTREMITY EDEMA: ICD-10-CM

## 2021-04-02 DIAGNOSIS — Z13.220 SCREENING FOR LIPOID DISORDERS: ICD-10-CM

## 2021-04-02 DIAGNOSIS — Z11.59 ENCOUNTER FOR HEPATITIS C SCREENING TEST FOR LOW RISK PATIENT: ICD-10-CM

## 2021-04-02 PROCEDURE — 99396 PREV VISIT EST AGE 40-64: CPT | Performed by: NURSE PRACTITIONER

## 2021-04-02 PROCEDURE — 99214 OFFICE O/P EST MOD 30 MIN: CPT | Performed by: NURSE PRACTITIONER

## 2021-04-02 NOTE — PROGRESS NOTES
"Chief Complaint  Edema (Pt previously had a doppler and states he was told that everything showed fine. That it might just be water retention. Pt states that some of his medications show that some of his medications show the side effects as swelling), Rash (Pt states a allergist has told him that he may have contact derm. Pt states its intermittent and is using cortisone cream. Pt states the rash started back in Jan and Feb. Pt states it started out rough and raised. Pt states that the rash is mildy itchy.), Groin Pain (Pt has a lump in the groin area. Pt was told by a prior provider that it could be a Hernia. Pt states its intermittent. ), and Annual Exam    Subjective          Ramirez Pena presents to [facility]  Ramirez presents for an annual exam.     He also presents with intermittent right lower extremity edema, previous doppler was negative. Resolves with elevation, no pain. Thought it may be related to socks, changed those, improved for a few weeks, then returned.    Seeing an allergist for contact dermatitis. Treated with topical steroids with some improvement.     Weight gain in past six months. He is not walking or going to the gym as he was previously when it was warm. He is interested in increasing his physical activity. Eating more lean meats, turkey, less red meats.     Swelling in right lower extremity past 3 months, waxing and waning. Saw neurology in December, had resolved at that point. No change with diet. Not consuming increased sodium. No pain or redness.       Objective   Vital Signs:   /68 (BP Location: Left arm, Patient Position: Sitting, Cuff Size: Large Adult)   Pulse 74   Temp 96.8 °F (36 °C) (Temporal)   Ht 177.8 cm (70\")   Wt 99.9 kg (220 lb 4.8 oz)   SpO2 98%   BMI 31.61 kg/m²     Physical Exam  Vitals reviewed.   Constitutional:       General: He is not in acute distress.     Appearance: He is well-developed. He is not diaphoretic.   HENT: "      Head: Normocephalic and atraumatic.      Right Ear: Tympanic membrane, ear canal and external ear normal.      Left Ear: Tympanic membrane, ear canal and external ear normal.      Nose: Nose normal.      Mouth/Throat:      Mouth: Mucous membranes are moist.      Pharynx: Oropharynx is clear. Uvula midline. No oropharyngeal exudate or posterior oropharyngeal erythema.   Eyes:      Conjunctiva/sclera: Conjunctivae normal.      Pupils: Pupils are equal, round, and reactive to light.   Neck:      Thyroid: No thyromegaly.   Cardiovascular:      Rate and Rhythm: Normal rate and regular rhythm.      Pulses: Normal pulses.      Heart sounds: Normal heart sounds. No murmur heard.   No friction rub. No gallop.    Pulmonary:      Effort: Pulmonary effort is normal. No respiratory distress.      Breath sounds: Normal breath sounds. No wheezing or rales.   Abdominal:      General: Bowel sounds are normal. There is no distension.      Palpations: Abdomen is soft. There is no mass.      Tenderness: There is no abdominal tenderness. There is no guarding or rebound.      Hernia: No hernia is present.   Musculoskeletal:      Cervical back: Neck supple.   Lymphadenopathy:      Cervical: No cervical adenopathy.   Skin:     General: Skin is warm and dry.      Comments: Right ankle edema, 2+ nonpitting, nontender, pedal pulses are palpable  Left ankle trace edema, nonpitting,   Scattered telangiectasis bilateral lower extremity   Neurological:      Mental Status: He is alert and oriented to person, place, and time.   Psychiatric:         Mood and Affect: Mood normal.        Result Review :                 Assessment and Plan    Diagnoses and all orders for this visit:    1. Annual physical exam (Primary)    2. Type 2 diabetes mellitus without complication, without long-term current use of insulin (CMS/[facility])  -     Comprehensive Metabolic Panel  -     Hemoglobin A1c  -     Microalbumin / Creatinine Urine Ratio - Urine, Clean  Catch    3. Screening for thyroid disorder  -     TSH Rfx On Abnormal To Free T4    4. Screening for prostate cancer  -     PSA Screen    5. Screening for lipoid disorders  -     Lipid Panel With LDL / HDL Ratio    6. Medication management  -     CBC & Differential  -     Comprehensive Metabolic Panel    7. Encounter for hepatitis C screening test for low risk patient  -     Hepatitis C Antibody    8. Routine screening for STI (sexually transmitted infection)  -     HIV-1/O/2 Ag/Ab w Reflex  -     RPR  -     Chlamydia trachomatis, Neisseria gonorrhoeae, PCR - Urine, Urine, Clean Catch    9. Lower extremity edema    10. Family history of colon cancer  -     Cancel: Ambulatory Referral For Screening Colonoscopy  -     Ambulatory Referral For Screening Colonoscopy    11. Change in bowel movement  -     Cancel: Ambulatory Referral For Screening Colonoscopy  -     Ambulatory Referral For Screening Colonoscopy    12. Screening for colon cancer  -     Cancel: Ambulatory Referral For Screening Colonoscopy  -     Ambulatory Referral For Screening Colonoscopy    13. Telangiectasis        Follow Up   Return in about 3 months (around 7/2/2021).  Patient was given instructions and counseling regarding his condition or for health maintenance advice. Please see specific information pulled into the AVS if appropriate.       Information/counseling provided to the patient regarding periodic dwain maintenance recommendations, including but not limited to immunizations, diet/exercise/healthy lifestyle, laboratory, and other screenings. BMI is discussed. Appropriate exercise, diet, and weight plans are discussed.    Discussed weight loss. Patient has reduced physical activity since the pandemic. Weight has increased to 220. Recommend resuming walking as well as cardiovascular work outs at his gym. He received his second covid vaccine. Would wait a minimal of two weeks before going back into the gym.     Swelling in lower extremities,  discussed compression socks, again, increasing his physical activity to improve circulation. Do not suspect medication causing swelling in past several months. Does have a resolving rash on his lower extremities, currently treated by derm for contact dermatitis.  Venous doppler negative. Labs completed previously with neuro, did not follow up with primary. A1C was in diabetic range, not currently on medication.  He does have varicosities in lower extremities, discussed this is likely a contributing factor to swelling.     Lump in groin and discomfort sounds like a hernia, not present today, will continue to monitor, if it returns he will follow up in office.     Reviewed healthcare maintenance. No recent colonoscopy, order placed last year, but due to pandemic, he was unable to complete that. Order placed to see Dr. Vu. He does report some stool changes.    Reviewed vaccinations. Since he just received his covid vaccine, recommend waiting on other vaccines today. He will return to the office in 3 months for follow up and will proceed with pneumovax 23 and tdap at that time. Discussed shingles vaccine, recommend a month away from covid and pneumonia vaccines. He is able to get this at his local pharmacy.   Answers for HPI/ROS submitted by the patient on 4/2/2021  What is the primary reason for your visit?: Physical

## 2021-04-05 LAB
ALBUMIN SERPL-MCNC: 4.9 G/DL (ref 3.5–5.2)
ALBUMIN/GLOB SERPL: 2.7 G/DL
ALP SERPL-CCNC: 55 U/L (ref 39–117)
ALT SERPL-CCNC: 37 U/L (ref 1–41)
AST SERPL-CCNC: 22 U/L (ref 1–40)
BASOPHILS # BLD AUTO: 0.04 10*3/MM3 (ref 0–0.2)
BASOPHILS NFR BLD AUTO: 0.6 % (ref 0–1.5)
BILIRUB SERPL-MCNC: 0.5 MG/DL (ref 0–1.2)
BUN SERPL-MCNC: 11 MG/DL (ref 6–20)
BUN/CREAT SERPL: 11.7 (ref 7–25)
C TRACH RRNA SPEC QL NAA+PROBE: NEGATIVE
CALCIUM SERPL-MCNC: 9.5 MG/DL (ref 8.6–10.5)
CHLORIDE SERPL-SCNC: 103 MMOL/L (ref 98–107)
CHOLEST SERPL-MCNC: 95 MG/DL (ref 0–200)
CO2 SERPL-SCNC: 25.4 MMOL/L (ref 22–29)
CREAT SERPL-MCNC: 0.94 MG/DL (ref 0.76–1.27)
EOSINOPHIL # BLD AUTO: 0.13 10*3/MM3 (ref 0–0.4)
EOSINOPHIL NFR BLD AUTO: 2 % (ref 0.3–6.2)
ERYTHROCYTE [DISTWIDTH] IN BLOOD BY AUTOMATED COUNT: 11.9 % (ref 12.3–15.4)
GLOBULIN SER CALC-MCNC: 1.8 GM/DL
GLUCOSE SERPL-MCNC: 86 MG/DL (ref 65–99)
HBA1C MFR BLD: 5.6 % (ref 4.8–5.6)
HCT VFR BLD AUTO: 44.6 % (ref 37.5–51)
HCV AB S/CO SERPL IA: <0.1 S/CO RATIO (ref 0–0.9)
HDLC SERPL-MCNC: 34 MG/DL (ref 40–60)
HGB BLD-MCNC: 15.6 G/DL (ref 13–17.7)
HIV 1+2 AB+HIV1 P24 AG SERPL QL IA: NON REACTIVE
IMM GRANULOCYTES # BLD AUTO: 0.03 10*3/MM3 (ref 0–0.05)
IMM GRANULOCYTES NFR BLD AUTO: 0.5 % (ref 0–0.5)
LDLC SERPL CALC-MCNC: 41 MG/DL (ref 0–100)
LDLC/HDLC SERPL: 1.19 {RATIO}
LYMPHOCYTES # BLD AUTO: 2.11 10*3/MM3 (ref 0.7–3.1)
LYMPHOCYTES NFR BLD AUTO: 33 % (ref 19.6–45.3)
MCH RBC QN AUTO: 32.3 PG (ref 26.6–33)
MCHC RBC AUTO-ENTMCNC: 35 G/DL (ref 31.5–35.7)
MCV RBC AUTO: 92.3 FL (ref 79–97)
MONOCYTES # BLD AUTO: 0.78 10*3/MM3 (ref 0.1–0.9)
MONOCYTES NFR BLD AUTO: 12.2 % (ref 5–12)
N GONORRHOEA RRNA SPEC QL NAA+PROBE: NEGATIVE
NEUTROPHILS # BLD AUTO: 3.3 10*3/MM3 (ref 1.7–7)
NEUTROPHILS NFR BLD AUTO: 51.7 % (ref 42.7–76)
NRBC BLD AUTO-RTO: 0 /100 WBC (ref 0–0.2)
PLATELET # BLD AUTO: 186 10*3/MM3 (ref 140–450)
POTASSIUM SERPL-SCNC: 4.6 MMOL/L (ref 3.5–5.2)
PROT SERPL-MCNC: 6.7 G/DL (ref 6–8.5)
PSA SERPL-MCNC: 1.16 NG/ML (ref 0–4)
RBC # BLD AUTO: 4.83 10*6/MM3 (ref 4.14–5.8)
RPR SER QL: NORMAL
SODIUM SERPL-SCNC: 139 MMOL/L (ref 136–145)
TRIGL SERPL-MCNC: 103 MG/DL (ref 0–150)
TSH SERPL DL<=0.005 MIU/L-ACNC: 1.48 UIU/ML (ref 0.27–4.2)
VLDLC SERPL CALC-MCNC: 20 MG/DL (ref 5–40)
WBC # BLD AUTO: 6.39 10*3/MM3 (ref 3.4–10.8)

## 2021-06-11 ENCOUNTER — HOSPITAL ENCOUNTER (OUTPATIENT)
Dept: CARDIOLOGY | Facility: HOSPITAL | Age: 56
Discharge: HOME | End: 2021-06-11
Admitting: NURSE PRACTITIONER

## 2021-06-11 DIAGNOSIS — I65.21 STENOSIS OF RIGHT CAROTID ARTERY: ICD-10-CM

## 2021-06-11 LAB
BH CV XLRA MEAS LEFT DIST CCA EDV: -27.4 CM/SEC
BH CV XLRA MEAS LEFT DIST CCA PSV: -76.8 CM/SEC
BH CV XLRA MEAS LEFT DIST ICA EDV: -34.5 CM/SEC
BH CV XLRA MEAS LEFT DIST ICA PSV: -80.7 CM/SEC
BH CV XLRA MEAS LEFT ICA/CCA RATIO: 1.05
BH CV XLRA MEAS LEFT MID ICA EDV: -15.7 CM/SEC
BH CV XLRA MEAS LEFT MID ICA PSV: -69 CM/SEC
BH CV XLRA MEAS LEFT PROX CCA EDV: 24.3 CM/SEC
BH CV XLRA MEAS LEFT PROX CCA PSV: 90.1 CM/SEC
BH CV XLRA MEAS LEFT PROX ECA EDV: -31.4 CM/SEC
BH CV XLRA MEAS LEFT PROX ECA PSV: -95.6 CM/SEC
BH CV XLRA MEAS LEFT PROX ICA EDV: -25.9 CM/SEC
BH CV XLRA MEAS LEFT PROX ICA PSV: -75.3 CM/SEC
BH CV XLRA MEAS LEFT PROX SCLA PSV: -98.8 CM/SEC
BH CV XLRA MEAS LEFT VERTEBRAL A EDV: -14 CM/SEC
BH CV XLRA MEAS LEFT VERTEBRAL A PSV: -36.9 CM/SEC
BH CV XLRA MEAS RIGHT DIST CCA EDV: 32.2 CM/SEC
BH CV XLRA MEAS RIGHT DIST CCA PSV: 91.1 CM/SEC
BH CV XLRA MEAS RIGHT DIST ICA EDV: -29.8 CM/SEC
BH CV XLRA MEAS RIGHT DIST ICA PSV: -73.7 CM/SEC
BH CV XLRA MEAS RIGHT ICA/CCA RATIO: 1.61
BH CV XLRA MEAS RIGHT MID ICA EDV: 25.1 CM/SEC
BH CV XLRA MEAS RIGHT MID ICA PSV: 85.5 CM/SEC
BH CV XLRA MEAS RIGHT PROX CCA EDV: 31.5 CM/SEC
BH CV XLRA MEAS RIGHT PROX CCA PSV: 110 CM/SEC
BH CV XLRA MEAS RIGHT PROX ECA EDV: -17.5 CM/SEC
BH CV XLRA MEAS RIGHT PROX ECA PSV: -72.9 CM/SEC
BH CV XLRA MEAS RIGHT PROX ICA EDV: 45.5 CM/SEC
BH CV XLRA MEAS RIGHT PROX ICA PSV: 147 CM/SEC
BH CV XLRA MEAS RIGHT PROX SCLA EDV: 10.5 CM/SEC
BH CV XLRA MEAS RIGHT PROX SCLA PSV: 89.6 CM/SEC
BH CV XLRA MEAS RIGHT VERTEBRAL A EDV: -15.5 CM/SEC
BH CV XLRA MEAS RIGHT VERTEBRAL A PSV: -41.6 CM/SEC
LEFT ARM BP: NORMAL MMHG
RIGHT ARM BP: NORMAL MMHG

## 2021-06-11 PROCEDURE — 93880 EXTRACRANIAL BILAT STUDY: CPT

## 2021-06-11 NOTE — PROGRESS NOTES
"DOS: 2021  NAME: Ramirez Pena   : 1965  PCP: Carmina Awad APRN       CC: TIA, carotid stenosis follow-up    Neurological Problem and Interval History:  55 y.o. right-handed male with HLD,  and ROBYN compliant with CPAP who is being seen in follow-up for TIA and carotid stenosis.    Following history was reviewed and updated as appropriate:  The patient was admitted to the hospital in 2020 after an episode of transient monocular vision loss in the right eye that lasted about 10 minutes.  There is no associated headache or scotoma.  There is no focal weakness or numbness.  CTA head/neck showed mild left vertebral artery narrowing and 50% stenosis in the right ICA secondary to calcified noncalcified plaque.  2D echo showed normal EF of 66%, normal LA size, and saline test results were negative.  He was diagnosed with diabetes due to hemoglobin A1c of 7.1% and was also diagnosed with hyperlipidemia with LDL of 127.  He was hypertensive during his admission but had been taking decongestant and his blood pressure seems to have improved off of the decongestant.  He was started on aspirin 325 mg, Plavix 75 mg and recommended continue taking both for 30 days and then stopping aspirin.  He was also started on Lipitor 80 mg and has not had any issues on this.      Carotid ultrasound was completed 2021 showed 50 to 59% stenosis in the proximal right ICA and plaque in the proximal left ICA without any significant stenosis, which was stable.    The patient had labs completed in 2021: Hemoglobin A1c was 5.60%, TSH was 1.48, LDL was 41, HDL 34, triglycerides 103, total cholesterol 95, CMP was unremarkable, and RPR was nonreactive.     Taking lipitor 80 mg and plavix 75 mg daily. No new s/sx of stroke. Walking more, going to get back to the gym. He has made changes to his diet but wants to lose some weight.    He smokes cigars 1-2x /wk and drinks alcohol \"every once in a while.\"  Family " history significant for chronic disease in his mother.  She did not have strokes but had visual changes and ultimately needed bilateral CEAs.    Review of Systems:        Review of Systems   Respiratory: Negative for chest tightness, shortness of breath and wheezing.    Cardiovascular: Negative for chest pain, palpitations and leg swelling.   Musculoskeletal: Negative for gait problem.   Neurological: Negative for dizziness, tremors, seizures, syncope, facial asymmetry, speech difficulty, weakness, light-headedness, numbness and headaches.   Psychiatric/Behavioral: Negative for agitation, behavioral problems, confusion, decreased concentration, dysphoric mood, hallucinations, self-injury, sleep disturbance and suicidal ideas. The patient is not nervous/anxious and is not hyperactive.    ROS completed by the MA was reviewed by me and I agree.       Current Outpatient Medications:   •  atorvastatin (LIPITOR) 80 MG tablet, Take 1 tablet by mouth Every Night., Disp: 30 tablet, Rfl: 11  •  azelastine (ASTELIN) 0.1 % nasal spray, Use 2 sprays into each nostril twice daily, Disp: 90 mL, Rfl: 3  •  clopidogrel (PLAVIX) 75 MG tablet, Take 1 tablet by mouth Daily., Disp: 90 tablet, Rfl: 2  •  EPINEPHrine (EPIPEN) 0.3 MG/0.3ML solution auto-injector injection, Use as directed., Disp: 2 each, Rfl: 3  •  fluticasone (FLONASE) 50 MCG/ACT nasal spray, 2 sprays into the nostril(s) as directed by provider Daily., Disp: , Rfl:   •  guaiFENesin ER (Mucinex Maximum Strength) 1200 MG tablet sustained-release 12 hour, , Disp: , Rfl:   •  montelukast (SINGULAIR) 10 MG tablet, Take 1 tablet by mouth Daily., Disp: 90 tablet, Rfl: 3        Laboratory Results:             Lab Results   Component Value Date    HGBA1C 5.60 04/02/2021         Lab Results   Component Value Date    CHOL 86 06/17/2020    CHOL 167 03/19/2020         Lab Results   Component Value Date    HDL 34 (L) 04/02/2021    HDL 34 (L) 06/17/2020    HDL 33 (L) 03/19/2020  "        Lab Results   Component Value Date    LDL 41 04/02/2021    LDL 41 06/17/2020     (H) 03/19/2020         Lab Results   Component Value Date    TRIG 103 04/02/2021    TRIG 56 06/17/2020    TRIG 108 03/19/2020     Lab Results   Component Value Date    RPR Comment 04/02/2021     Lab Results   Component Value Date    TSH 1.480 04/02/2021     No results found for: [id number]  Vitals:    06/22/21 1526   BP: 148/82   BP Location: Right arm   Patient Position: Sitting   Cuff Size: Adult   Pulse: 71   SpO2: 97%   Weight: 99.8 kg (220 lb)   Height: 177.8 cm (70\")       Physical Examination  General Appearance:   Well developed, well nourished, well groomed, alert, and cooperative.  HEENT: Normocephalic.   Neck and Spine: Normal range of motion.  Normal alignment. No mass or tenderness.   Cardiac: Regular rate and rhythm. No murmurs.  Peripheral Vasculature: Radial pulses are equal and symmetric.   Extremities:    No edema or deformities. Normal joint ROM.  Skin:    No rashes or birth marks.    Neurological examination:  Higher Integrative  Function: Oriented to time, place and person. Normal registration, recall, attention span and concentration. Normal language including comprehension, spontaneous speech,  and vocabulary. No neglect. Normal fund of knowledge and higher integrative function.  CN II: Pupils are equal, round, and reactive to light. Normal visual fields.    CN III IV VI: Extraocular movements are full without nystagmus.   CN V: Normal facial sensation and strength of muscles of mastication.  CN VII: Facial movements are symmetric. No weakness.  CN VIII:   Auditory acuity is normal to finger rub.  CN IX & X:   Symmetric palatal movement.  CN XI: Sternocleidomastoid and trapezius are normal.  No weakness.  CN XII:   The tongue is midline.  No atrophy or fasciculations.  Motor: Normal muscle strength, bulk and tone in upper and lower extremities.  No fasciculations, rigidity, spasticity, or abnormal " movements.  Reflexes: Reflexes normal/symmetric.  Sensation: Normal to light touch in all extremities.  Station and Gait: Normal gait and station.    Coordination: Finger to nose test shows no dysmetria.  Rapid alternating movements are normal.  Heel to shin normal.    Diagnoses / Discussion:    History of right amaurosis fugax  History of TIA  Right carotid stenosis  Plan:   Continue Plavix 75 mg daily    Recommend increase physical activity and healthy diet to obtain/maintain a healthy weight   Recommend smoking cessation   Blood pressure control to <130/80   Goal LDL <70-recommend high dose statins-continue Lipitor 80 mg daily    Serum glucose < 140   Call 911 for stroke any stroke symptoms   Follow-up carotid ultrasound and visit in 1 year or sooner if needed  Diagnoses and all orders for this visit:    1. Transient ischemic attack (TIA) (Primary)    2. Stenosis of right carotid artery  -     US Carotid Bilateral; Future    3. History of amaurosis fugax  -     US Carotid Bilateral; Future        Coding

## 2021-06-14 ENCOUNTER — TELEPHONE (OUTPATIENT)
Dept: NEUROLOGY | Facility: CLINIC | Age: 56
End: 2021-06-14

## 2021-06-14 NOTE — TELEPHONE ENCOUNTER
----- Message from IVETTE Neri sent at 6/14/2021 12:45 PM EDT -----  Please let patient know his R ICA blockage is stable/unchanged on carotid u/s.

## 2021-06-14 NOTE — TELEPHONE ENCOUNTER
Called pt with results. Pt verbalized understanding. Pt asked if you still needed to see him on 06/22/21.

## 2021-06-16 ENCOUNTER — TRANSCRIBE ORDERS (OUTPATIENT)
Dept: LAB | Facility: SURGERY CENTER | Age: 56
End: 2021-06-16

## 2021-06-16 ENCOUNTER — PREP FOR SURGERY (OUTPATIENT)
Dept: SURGERY | Facility: SURGERY CENTER | Age: 56
End: 2021-06-16

## 2021-06-16 ENCOUNTER — OFFICE VISIT (OUTPATIENT)
Dept: GASTROENTEROLOGY | Facility: CLINIC | Age: 56
End: 2021-06-16

## 2021-06-16 VITALS
WEIGHT: 223.1 LBS | BODY MASS INDEX: 31.94 KG/M2 | DIASTOLIC BLOOD PRESSURE: 70 MMHG | SYSTOLIC BLOOD PRESSURE: 118 MMHG | HEIGHT: 70 IN | HEART RATE: 74 BPM | OXYGEN SATURATION: 98 % | TEMPERATURE: 97.3 F

## 2021-06-16 DIAGNOSIS — Z80.0 FAMILY HISTORY OF ESOPHAGEAL CANCER: ICD-10-CM

## 2021-06-16 DIAGNOSIS — Z86.010 HISTORY OF COLON POLYPS: Primary | ICD-10-CM

## 2021-06-16 DIAGNOSIS — Z80.0 FAMILY HISTORY OF COLON CANCER: ICD-10-CM

## 2021-06-16 DIAGNOSIS — Z01.818 OTHER SPECIFIED PRE-OPERATIVE EXAMINATION: Primary | ICD-10-CM

## 2021-06-16 DIAGNOSIS — Z12.11 ENCOUNTER FOR SCREENING FOR MALIGNANT NEOPLASM OF COLON: ICD-10-CM

## 2021-06-16 PROBLEM — Z86.0100 HISTORY OF COLON POLYPS: Status: ACTIVE | Noted: 2021-06-16

## 2021-06-16 PROCEDURE — 99244 OFF/OP CNSLTJ NEW/EST MOD 40: CPT | Performed by: INTERNAL MEDICINE

## 2021-06-16 RX ORDER — GUAIFENESIN 1200 MG/1
TABLET, EXTENDED RELEASE ORAL
COMMUNITY
End: 2021-09-23

## 2021-06-16 RX ORDER — SODIUM CHLORIDE 0.9 % (FLUSH) 0.9 %
10 SYRINGE (ML) INJECTION AS NEEDED
Status: CANCELLED | OUTPATIENT
Start: 2021-06-16

## 2021-06-16 RX ORDER — SODIUM CHLORIDE 0.9 % (FLUSH) 0.9 %
3 SYRINGE (ML) INJECTION EVERY 12 HOURS SCHEDULED
Status: CANCELLED | OUTPATIENT
Start: 2021-06-16

## 2021-06-16 RX ORDER — SODIUM CHLORIDE, SODIUM LACTATE, POTASSIUM CHLORIDE, CALCIUM CHLORIDE 600; 310; 30; 20 MG/100ML; MG/100ML; MG/100ML; MG/100ML
30 INJECTION, SOLUTION INTRAVENOUS CONTINUOUS PRN
Status: CANCELLED | OUTPATIENT
Start: 2021-06-16

## 2021-06-16 NOTE — PATIENT INSTRUCTIONS
Schedule colonoscopy for colon cancer screening.    Schedule EGD due to family history of esophageal cancer.

## 2021-06-16 NOTE — PROGRESS NOTES
"Chief Complaint   Patient presents with   • GI Problem           History of Present Illness  Patient is a 55 year old male who presents today for evaluation.    Patient reports in the late 1990s he had anal fissures. He reports his grandfather and father had esophageal cancer cancer. He reports he had his first colonoscopy then and has had several since. He reports most recent colonoscopy was around 6-7 years ago and he had polyps.    Patient reports he has noted a change in his bowel habits. He reports he can go 2-3 days without having a bowel movement and then will have a day where he would have multiple bowel movements and then go back to normal. He reports over the last 6 months, he has experienced worsening constipation and he feels he is not emptying completely. He denies any rectal bleeding currently, but has had some in the past.    He denies GERD or dysphagia.    He drinks alcohol socially and smokes a cigar around once per week.    Review of Systems     Result Review :           Vital Signs:   /70   Pulse 74   Temp 97.3 °F (36.3 °C)   Ht 177.8 cm (70\")   Wt 101 kg (223 lb 1.6 oz)   SpO2 98%   BMI 32.01 kg/m²     Body mass index is 32.01 kg/m².     Physical Exam  Vitals reviewed.   Constitutional:       Appearance: Normal appearance.   HENT:      Nose: No nasal deformity.   Eyes:      General: No scleral icterus.  Neck:      Comments: Trachea midline.  Cardiovascular:      Rate and Rhythm: Normal rate and regular rhythm.   Pulmonary:      Effort: No respiratory distress.      Breath sounds: Normal breath sounds.   Abdominal:      General: Bowel sounds are normal. There is no distension.      Palpations: Abdomen is soft. There is no mass.      Tenderness: There is no abdominal tenderness.   Lymphadenopathy:      Comments: No periumbilical lymphadenopathy.   Skin:     General: Skin is warm.   Neurological:      Mental Status: He is alert.           Assessment and Plan    Diagnoses and all orders " for this visit:    1. History of colon polyps (Primary)    2. Encounter for screening for malignant neoplasm of colon    3. Family history of colon cancer    4. Family history of esophageal cancer       I have reviewed and confirmed the accuracy of the HPI and Assessment and Plan as documented by the APRN IVETTE Retana        Follow Up   No follow-ups on file.    Patient Instructions   Schedule colonoscopy for colon cancer screening.    Schedule EGD due to family history of esophageal cancer.        Documentation by Mora STEELE acting as a scribe in the following sections on behalf of the billable provider: HPI, ROS, assessment, & plan.

## 2021-06-22 ENCOUNTER — OFFICE VISIT (OUTPATIENT)
Dept: NEUROLOGY | Facility: CLINIC | Age: 56
End: 2021-06-22

## 2021-06-22 VITALS
HEIGHT: 70 IN | OXYGEN SATURATION: 97 % | BODY MASS INDEX: 31.5 KG/M2 | DIASTOLIC BLOOD PRESSURE: 82 MMHG | SYSTOLIC BLOOD PRESSURE: 148 MMHG | WEIGHT: 220 LBS | HEART RATE: 71 BPM

## 2021-06-22 DIAGNOSIS — Z86.69 HISTORY OF AMAUROSIS FUGAX: ICD-10-CM

## 2021-06-22 DIAGNOSIS — G45.9 TRANSIENT ISCHEMIC ATTACK (TIA): Primary | ICD-10-CM

## 2021-06-22 DIAGNOSIS — I65.21 STENOSIS OF RIGHT CAROTID ARTERY: ICD-10-CM

## 2021-06-22 PROBLEM — E11.9 TYPE 2 DIABETES MELLITUS: Status: RESOLVED | Noted: 2020-03-19 | Resolved: 2021-06-22

## 2021-06-22 PROCEDURE — 99213 OFFICE O/P EST LOW 20 MIN: CPT | Performed by: NURSE PRACTITIONER

## 2021-07-02 ENCOUNTER — OFFICE VISIT (OUTPATIENT)
Dept: FAMILY MEDICINE CLINIC | Facility: CLINIC | Age: 56
End: 2021-07-02

## 2021-07-02 VITALS
DIASTOLIC BLOOD PRESSURE: 78 MMHG | BODY MASS INDEX: 32.17 KG/M2 | WEIGHT: 224.7 LBS | HEART RATE: 80 BPM | HEIGHT: 70 IN | TEMPERATURE: 97.3 F | RESPIRATION RATE: 16 BRPM | SYSTOLIC BLOOD PRESSURE: 130 MMHG | OXYGEN SATURATION: 99 %

## 2021-07-02 DIAGNOSIS — R60.0 LOWER EXTREMITY EDEMA: Primary | ICD-10-CM

## 2021-07-02 DIAGNOSIS — Z23 ENCOUNTER FOR IMMUNIZATION: ICD-10-CM

## 2021-07-02 PROCEDURE — 90732 PPSV23 VACC 2 YRS+ SUBQ/IM: CPT | Performed by: NURSE PRACTITIONER

## 2021-07-02 PROCEDURE — 90715 TDAP VACCINE 7 YRS/> IM: CPT | Performed by: NURSE PRACTITIONER

## 2021-07-02 PROCEDURE — 90472 IMMUNIZATION ADMIN EACH ADD: CPT | Performed by: NURSE PRACTITIONER

## 2021-07-02 PROCEDURE — 90471 IMMUNIZATION ADMIN: CPT | Performed by: NURSE PRACTITIONER

## 2021-07-02 PROCEDURE — 99213 OFFICE O/P EST LOW 20 MIN: CPT | Performed by: NURSE PRACTITIONER

## 2021-07-02 NOTE — PROGRESS NOTES
"Chief Complaint  No chief complaint on file.    Subjective          Ramirez Pena presents to [facility]  Ramirez presents for follow up and for vaccines today. He was evaluated in April. All labs were completed at that time. He is still having some swelling in his right foot, however does improve with compression socks. He has no acute concerns today.    He is still following with neurology.       Objective   Vital Signs:   /78   Pulse 80   Temp 97.3 °F (36.3 °C) (Temporal)   Resp 16   Ht 177.8 cm (70\")   Wt 102 kg (224 lb 11.2 oz)   SpO2 99%   BMI 32.24 kg/m²     Physical Exam  Vitals reviewed.   Constitutional:       Appearance: Normal appearance.   Cardiovascular:      Rate and Rhythm: Normal rate and regular rhythm.      Heart sounds: No murmur heard.   No friction rub. No gallop.    Pulmonary:      Effort: Pulmonary effort is normal. No respiratory distress.      Breath sounds: Normal breath sounds. No stridor. No wheezing, rhonchi or rales.   Abdominal:      General: Bowel sounds are normal.      Palpations: Abdomen is soft.   Skin:     General: Skin is warm and dry.   Neurological:      Mental Status: He is alert and oriented to person, place, and time.   Psychiatric:         Mood and Affect: Mood normal.        Result Review :     Common labs    Common Labsle 4/2/21 4/2/21 4/2/21 4/2/21 4/2/21    1125 1125 1125 1125 1125   Glucose   86     BUN   11     Creatinine   0.94     eGFR Non  Am   83     eGFR African Am   101     Sodium   139     Potassium   4.6     Chloride   103     Calcium   9.5     Total Protein   6.7     Albumin   4.90     Total Bilirubin   0.5     Alkaline Phosphatase   55     AST (SGOT)   22     ALT (SGPT)   37     WBC  6.39      Hemoglobin  15.6      Hematocrit  44.6      Platelets  186      Total Cholesterol    95    Triglycerides    103    HDL Cholesterol    34 (A)    LDL Cholesterol     41    Hemoglobin A1C     5.60   PSA 1.160     "   (A) Abnormal value       Comments are available for some flowsheets but are not being displayed.                     Assessment and Plan    Diagnoses and all orders for this visit:    1. Lower extremity edema (Primary)    2. Encounter for immunization    Other orders  -     Tdap Vaccine Greater Than or Equal To 6yo IM  -     Pneumococcal Polysaccharide Vaccine 23-Valent (PPSV23) Greater Than or Equal To 3yo Subcutaneous / IM        Follow Up   No follow-ups on file.  Patient was given instructions and counseling regarding his condition or for health maintenance advice. Please see specific information pulled into the AVS if appropriate.     Update tdap and pneumovax  Discussed shingrix, he will discuss with his pharmacist  Recent labs reviewed, no need to repeat at this time  Lower extremity edema, he is wearing compression socks with improvement, discussed increasing physical activity to help improve circulation. Would not use diuretic at this point as it is responding well, previous DVT negative, no current swelling today  Answers for HPI/ROS submitted by the patient on 6/30/2021  What is the primary reason for your visit?: Other  Please describe your symptoms.: General checkup - vaccinations that were put off due to COVID-19 shot.  Have you had these symptoms before?: No  How long have you been having these symptoms?: Greater than 2 weeks  Please list any medications you are currently taking for this condition.: N/A  Please describe any probable cause for these symptoms. : General Checkup and vaccinations that are necessary.

## 2021-07-27 RX ORDER — CLOPIDOGREL BISULFATE 75 MG/1
75 TABLET ORAL DAILY
Qty: 90 TABLET | Refills: 2 | Status: SHIPPED | OUTPATIENT
Start: 2021-07-27 | End: 2022-05-08 | Stop reason: SDUPTHER

## 2021-08-12 RX ORDER — CLOPIDOGREL BISULFATE 75 MG/1
75 TABLET ORAL DAILY
Qty: 90 TABLET | Refills: 2 | OUTPATIENT
Start: 2021-08-12

## 2021-09-20 ENCOUNTER — TELEPHONE (OUTPATIENT)
Dept: GASTROENTEROLOGY | Facility: CLINIC | Age: 56
End: 2021-09-20

## 2021-09-20 NOTE — TELEPHONE ENCOUNTER
Patient left a voicemail wanting to know if he can take his Lipitor before his procedure. Please advise.

## 2021-09-20 NOTE — TELEPHONE ENCOUNTER
It's ok for him to take it leading up to the procedures. He should hold it the morning of the procedure.

## 2021-09-25 ENCOUNTER — LAB (OUTPATIENT)
Dept: LAB | Facility: SURGERY CENTER | Age: 56
End: 2021-09-25

## 2021-09-25 ENCOUNTER — LAB REQUISITION (OUTPATIENT)
Dept: LAB | Facility: HOSPITAL | Age: 56
End: 2021-09-25

## 2021-09-25 DIAGNOSIS — Z00.00 ENCOUNTER FOR GENERAL ADULT MEDICAL EXAMINATION WITHOUT ABNORMAL FINDINGS: ICD-10-CM

## 2021-09-25 LAB — SARS-COV-2 ORF1AB RESP QL NAA+PROBE: NOT DETECTED

## 2021-09-25 PROCEDURE — U0004 COV-19 TEST NON-CDC HGH THRU: HCPCS | Performed by: SURGERY

## 2021-09-28 ENCOUNTER — ANESTHESIA EVENT (OUTPATIENT)
Dept: SURGERY | Facility: SURGERY CENTER | Age: 56
End: 2021-09-28

## 2021-09-28 ENCOUNTER — ANESTHESIA (OUTPATIENT)
Dept: SURGERY | Facility: SURGERY CENTER | Age: 56
End: 2021-09-28

## 2021-09-28 ENCOUNTER — HOSPITAL ENCOUNTER (OUTPATIENT)
Facility: SURGERY CENTER | Age: 56
Setting detail: HOSPITAL OUTPATIENT SURGERY
Discharge: HOME | End: 2021-09-28
Attending: INTERNAL MEDICINE | Admitting: INTERNAL MEDICINE

## 2021-09-28 VITALS
BODY MASS INDEX: 31.21 KG/M2 | HEIGHT: 70 IN | WEIGHT: 218 LBS | SYSTOLIC BLOOD PRESSURE: 110 MMHG | HEART RATE: 52 BPM | OXYGEN SATURATION: 96 % | RESPIRATION RATE: 16 BRPM | TEMPERATURE: 97.4 F | DIASTOLIC BLOOD PRESSURE: 72 MMHG

## 2021-09-28 DIAGNOSIS — Z12.11 ENCOUNTER FOR SCREENING FOR MALIGNANT NEOPLASM OF COLON: ICD-10-CM

## 2021-09-28 DIAGNOSIS — Z86.010 HISTORY OF COLON POLYPS: ICD-10-CM

## 2021-09-28 DIAGNOSIS — Z80.0 FAMILY HISTORY OF COLON CANCER: ICD-10-CM

## 2021-09-28 DIAGNOSIS — Z80.0 FAMILY HISTORY OF ESOPHAGEAL CANCER: ICD-10-CM

## 2021-09-28 PROCEDURE — 43239 EGD BIOPSY SINGLE/MULTIPLE: CPT | Performed by: INTERNAL MEDICINE

## 2021-09-28 PROCEDURE — 25010000002 PROPOFOL 10 MG/ML EMULSION: Performed by: ANESTHESIOLOGY

## 2021-09-28 PROCEDURE — 88305 TISSUE EXAM BY PATHOLOGIST: CPT | Performed by: INTERNAL MEDICINE

## 2021-09-28 PROCEDURE — 0DBK8ZX EXCISION OF ASCENDING COLON, VIA NATURAL OR ARTIFICIAL OPENING ENDOSCOPIC, DIAGNOSTIC: ICD-10-PCS | Performed by: NURSE PRACTITIONER

## 2021-09-28 PROCEDURE — 0DB98ZX EXCISION OF DUODENUM, VIA NATURAL OR ARTIFICIAL OPENING ENDOSCOPIC, DIAGNOSTIC: ICD-10-PCS | Performed by: NURSE PRACTITIONER

## 2021-09-28 PROCEDURE — 45380 COLONOSCOPY AND BIOPSY: CPT | Performed by: INTERNAL MEDICINE

## 2021-09-28 RX ORDER — PROPOFOL 10 MG/ML
VIAL (ML) INTRAVENOUS AS NEEDED
Status: DISCONTINUED | OUTPATIENT
Start: 2021-09-28 | End: 2021-09-28 | Stop reason: SURG

## 2021-09-28 RX ORDER — PROPOFOL 10 MG/ML
VIAL (ML) INTRAVENOUS CONTINUOUS PRN
Status: DISCONTINUED | OUTPATIENT
Start: 2021-09-28 | End: 2021-09-28 | Stop reason: SURG

## 2021-09-28 RX ORDER — SODIUM CHLORIDE 0.9 % (FLUSH) 0.9 %
10 SYRINGE (ML) INJECTION AS NEEDED
Status: DISCONTINUED | OUTPATIENT
Start: 2021-09-28 | End: 2021-09-28 | Stop reason: HOSPADM

## 2021-09-28 RX ORDER — MULTIPLE VITAMINS W/ MINERALS TAB 9MG-400MCG
1 TAB ORAL DAILY
COMMUNITY

## 2021-09-28 RX ORDER — SODIUM CHLORIDE, SODIUM LACTATE, POTASSIUM CHLORIDE, CALCIUM CHLORIDE 600; 310; 30; 20 MG/100ML; MG/100ML; MG/100ML; MG/100ML
INJECTION, SOLUTION INTRAVENOUS CONTINUOUS PRN
Status: DISCONTINUED | OUTPATIENT
Start: 2021-09-28 | End: 2021-09-28 | Stop reason: SURG

## 2021-09-28 RX ORDER — SODIUM CHLORIDE 0.9 % (FLUSH) 0.9 %
3 SYRINGE (ML) INJECTION EVERY 12 HOURS SCHEDULED
Status: DISCONTINUED | OUTPATIENT
Start: 2021-09-28 | End: 2021-09-28 | Stop reason: HOSPADM

## 2021-09-28 RX ORDER — SODIUM CHLORIDE, SODIUM LACTATE, POTASSIUM CHLORIDE, CALCIUM CHLORIDE 600; 310; 30; 20 MG/100ML; MG/100ML; MG/100ML; MG/100ML
30 INJECTION, SOLUTION INTRAVENOUS CONTINUOUS PRN
Status: DISCONTINUED | OUTPATIENT
Start: 2021-09-28 | End: 2021-09-28 | Stop reason: HOSPADM

## 2021-09-28 RX ORDER — LIDOCAINE HYDROCHLORIDE 20 MG/ML
INJECTION, SOLUTION INFILTRATION; PERINEURAL AS NEEDED
Status: DISCONTINUED | OUTPATIENT
Start: 2021-09-28 | End: 2021-09-28 | Stop reason: SURG

## 2021-09-28 RX ADMIN — SODIUM CHLORIDE, POTASSIUM CHLORIDE, SODIUM LACTATE AND CALCIUM CHLORIDE 30 ML/HR: 600; 310; 30; 20 INJECTION, SOLUTION INTRAVENOUS at 08:14

## 2021-09-28 RX ADMIN — GLYCOPYRROLATE 0.2 MCG: 0.2 INJECTION, SOLUTION INTRAMUSCULAR; INTRAVITREAL at 08:52

## 2021-09-28 RX ADMIN — PROPOFOL 100 MG: 10 INJECTION, EMULSION INTRAVENOUS at 08:33

## 2021-09-28 RX ADMIN — Medication 160 MCG/KG/MIN: at 08:33

## 2021-09-28 RX ADMIN — LIDOCAINE HYDROCHLORIDE 60 MG: 20 INJECTION, SOLUTION INFILTRATION; PERINEURAL at 08:33

## 2021-09-28 RX ADMIN — SODIUM CHLORIDE, SODIUM LACTATE, POTASSIUM CHLORIDE, AND CALCIUM CHLORIDE: .6; .31; .03; .02 INJECTION, SOLUTION INTRAVENOUS at 08:33

## 2021-09-28 NOTE — ANESTHESIA POSTPROCEDURE EVALUATION
"Patient: Ramirez Pena    Procedure Summary     Date: 09/28/21 Room / Location: [facility]  / [facility]    Anesthesia Start: 0825 Anesthesia Stop: 0859    Procedures:       ESOPHAGOGASTRODUODENOSCOPY (N/A )      COLONOSCOPY FOR SCREENING (N/A ) Diagnosis:       History of colon polyps      Encounter for screening for malignant neoplasm of colon      Family history of colon cancer      Family history of esophageal cancer      (History of colon polyps [Z86.010])      (Encounter for screening for malignant neoplasm of colon [Z12.11])      (Family history of colon cancer [Z80.0])      (Family history of esophageal cancer [Z80.0])    Surgeons: Nato Vu MD Provider: Everton Moreno MD    Anesthesia Type: MAC ASA Status: 2          Anesthesia Type: MAC    Vitals  Vitals Value Taken Time   BP 99/67 09/28/21 0901   Temp 36.3 °C (97.4 °F) 09/28/21 0901   Pulse 52 09/28/21 0901   Resp 16 09/28/21 0901   SpO2 96 % 09/28/21 0901           Post Anesthesia Care and Evaluation    Patient location during evaluation: bedside  Patient participation: complete - patient participated  Level of consciousness: awake and alert  Pain management: adequate  Airway patency: patent  Anesthetic complications: No anesthetic complications    Cardiovascular status: acceptable  Respiratory status: acceptable  Hydration status: acceptable    Comments: BP 99/67   Pulse 52   Temp 36.3 °C (97.4 °F)   Resp 16   Ht 177.8 cm (70\")   Wt 98.9 kg (218 lb)   SpO2 96%   BMI 31.28 kg/m²       "

## 2021-09-28 NOTE — ANESTHESIA PREPROCEDURE EVALUATION
Anesthesia Evaluation     Patient summary reviewed and Nursing notes reviewed   history of anesthetic complications: PONV  NPO Solid Status: > 8 hours  NPO Liquid Status: > 2 hours           Airway   Mallampati: II  TM distance: >3 FB  Neck ROM: full  no difficulty expected  Dental - normal exam     Pulmonary - normal exam   (+) sleep apnea,   (-) decreased breath sounds, wheezes  Cardiovascular - normal exam  Exercise tolerance: good (4-7 METS)    (-) hypertension      Neuro/Psych- negative ROS  (-) seizures, CVA  GI/Hepatic/Renal/Endo    (+) obesity,   diabetes mellitus,     Musculoskeletal     (+) neck pain,   Abdominal  - normal exam   Substance History - negative use  (-) alcohol use, drug use     OB/GYN negative ob/gyn ROS         Other - negative ROS                       Anesthesia Plan    ASA 2     MAC     intravenous induction     Anesthetic plan, all risks, benefits, and alternatives have been provided, discussed and informed consent has been obtained with: patient.

## 2021-09-30 ENCOUNTER — PREP FOR SURGERY (OUTPATIENT)
Dept: SURGERY | Facility: SURGERY CENTER | Age: 56
End: 2021-09-30

## 2021-09-30 DIAGNOSIS — K20.90 ESOPHAGITIS: Primary | ICD-10-CM

## 2021-09-30 LAB
LAB AP CASE REPORT: NORMAL
PATH REPORT.FINAL DX SPEC: NORMAL
PATH REPORT.GROSS SPEC: NORMAL

## 2021-09-30 RX ORDER — SODIUM CHLORIDE 0.9 % (FLUSH) 0.9 %
10 SYRINGE (ML) INJECTION AS NEEDED
Status: CANCELLED | OUTPATIENT
Start: 2021-09-30

## 2021-09-30 RX ORDER — SODIUM CHLORIDE, SODIUM LACTATE, POTASSIUM CHLORIDE, CALCIUM CHLORIDE 600; 310; 30; 20 MG/100ML; MG/100ML; MG/100ML; MG/100ML
30 INJECTION, SOLUTION INTRAVENOUS CONTINUOUS PRN
Status: CANCELLED | OUTPATIENT
Start: 2021-09-30

## 2021-09-30 RX ORDER — SODIUM CHLORIDE 0.9 % (FLUSH) 0.9 %
3 SYRINGE (ML) INJECTION EVERY 12 HOURS SCHEDULED
Status: CANCELLED | OUTPATIENT
Start: 2021-09-30

## 2021-10-06 ENCOUNTER — OFFICE VISIT (OUTPATIENT)
Dept: FAMILY MEDICINE CLINIC | Facility: CLINIC | Age: 56
End: 2021-10-06

## 2021-10-06 VITALS
OXYGEN SATURATION: 99 % | BODY MASS INDEX: 31.98 KG/M2 | HEART RATE: 60 BPM | RESPIRATION RATE: 16 BRPM | HEIGHT: 70 IN | DIASTOLIC BLOOD PRESSURE: 94 MMHG | WEIGHT: 223.4 LBS | SYSTOLIC BLOOD PRESSURE: 144 MMHG | TEMPERATURE: 96.2 F

## 2021-10-06 DIAGNOSIS — R73.09 ELEVATED GLUCOSE: ICD-10-CM

## 2021-10-06 DIAGNOSIS — K40.90 NON-RECURRENT UNILATERAL INGUINAL HERNIA WITHOUT OBSTRUCTION OR GANGRENE: Primary | ICD-10-CM

## 2021-10-06 PROCEDURE — 90686 IIV4 VACC NO PRSV 0.5 ML IM: CPT | Performed by: NURSE PRACTITIONER

## 2021-10-06 PROCEDURE — 99213 OFFICE O/P EST LOW 20 MIN: CPT | Performed by: NURSE PRACTITIONER

## 2021-10-06 PROCEDURE — 90471 IMMUNIZATION ADMIN: CPT | Performed by: NURSE PRACTITIONER

## 2021-10-06 RX ORDER — OMEPRAZOLE 20 MG/1
20 TABLET, DELAYED RELEASE ORAL NIGHTLY
COMMUNITY
Start: 2021-10-03 | End: 2023-02-08

## 2021-10-06 NOTE — PROGRESS NOTES
"Chief Complaint  Mass (groin area since June)    Subjective          Ramirez Pena presents to [facility]  Ramirez presents with a lump in his groin. Has been present since June and worsening. States today it is not as pronounced.     Started on prilosec, states stomach is worse on prilosec, was taking this in the morning. Having cramps and aching.       Objective   Vital Signs:   /94   Pulse 60   Temp 96.2 °F (35.7 °C) (Infrared)   Resp 16   Ht 177.8 cm (70\")   Wt 101 kg (223 lb 6.4 oz)   SpO2 99%   BMI 32.05 kg/m²     Physical Exam  Constitutional:       Appearance: Normal appearance.   Cardiovascular:      Rate and Rhythm: Normal rate and regular rhythm.   Pulmonary:      Effort: Pulmonary effort is normal. No respiratory distress.      Breath sounds: Normal breath sounds. No wheezing, rhonchi or rales.   Abdominal:      Hernia: A hernia is present. Hernia is present in the left inguinal area (tender to palpate).   Neurological:      Mental Status: He is alert and oriented to person, place, and time.   Psychiatric:         Mood and Affect: Mood normal.        Result Review :                 Assessment and Plan    Diagnoses and all orders for this visit:    1. Non-recurrent unilateral inguinal hernia without obstruction or gangrene (Primary)  -     Ambulatory Referral to General Surgery    2. Elevated glucose  -     Hemoglobin A1c  -     Comprehensive metabolic panel    Other orders  -     FluLaval/Fluarix >6 Months (3445-7555)        Follow Up   No follow-ups on file.  Patient was given instructions and counseling regarding his condition or for health maintenance advice. Please see specific information pulled into the AVS if appropriate.       Answers for HPI/ROS submitted by the patient on 10/6/2021  What is the primary reason for your visit?: Other  Please describe your symptoms.: Lump in groin area. Causing discomfort an pain.  Have you had these symptoms before?: " Yes  How long have you been having these symptoms?: Greater than 2 weeks    Hernia: recommend follow up with general surgeon, referral placed, it has worsened and is causing more pain  It is reducible, will defer treatment to surgeon     On omeprazole, having increased abdominal pain, did tolerate night time dosing, ok to take at night if tolerating better    Discussed diabetes, weight gain, will monitor labs  Would consider ozempic to manage diabetes and to help facilitate weight if appropriate, will hold until labs are reviewed

## 2021-10-07 ENCOUNTER — TELEPHONE (OUTPATIENT)
Dept: GASTROENTEROLOGY | Facility: CLINIC | Age: 56
End: 2021-10-07

## 2021-10-07 DIAGNOSIS — E66.1 CLASS 1 DRUG-INDUCED OBESITY WITH BODY MASS INDEX (BMI) OF 32.0 TO 32.9 IN ADULT, UNSPECIFIED WHETHER SERIOUS COMORBIDITY PRESENT: ICD-10-CM

## 2021-10-07 DIAGNOSIS — R73.09 ELEVATED GLUCOSE: Primary | ICD-10-CM

## 2021-10-07 LAB
ALBUMIN SERPL-MCNC: 5 G/DL (ref 3.5–5.2)
ALBUMIN/GLOB SERPL: 2.8 G/DL
ALP SERPL-CCNC: 60 U/L (ref 39–117)
ALT SERPL-CCNC: 44 U/L (ref 1–41)
AST SERPL-CCNC: 27 U/L (ref 1–40)
BILIRUB SERPL-MCNC: 0.6 MG/DL (ref 0–1.2)
BUN SERPL-MCNC: 10 MG/DL (ref 6–20)
BUN/CREAT SERPL: 16.4 (ref 7–25)
CALCIUM SERPL-MCNC: 9.3 MG/DL (ref 8.6–10.5)
CHLORIDE SERPL-SCNC: 99 MMOL/L (ref 98–107)
CO2 SERPL-SCNC: 24 MMOL/L (ref 22–29)
CREAT SERPL-MCNC: 0.61 MG/DL (ref 0.76–1.27)
GLOBULIN SER CALC-MCNC: 1.8 GM/DL
GLUCOSE SERPL-MCNC: 116 MG/DL (ref 65–99)
HBA1C MFR BLD: 5.7 % (ref 4.8–5.6)
POTASSIUM SERPL-SCNC: 4.3 MMOL/L (ref 3.5–5.2)
PROT SERPL-MCNC: 6.8 G/DL (ref 6–8.5)
SODIUM SERPL-SCNC: 135 MMOL/L (ref 136–145)

## 2021-10-07 RX ORDER — SEMAGLUTIDE 1.34 MG/ML
0.25 INJECTION, SOLUTION SUBCUTANEOUS WEEKLY
Qty: 1 PEN | Refills: 2 | Status: SHIPPED | OUTPATIENT
Start: 2021-10-07 | End: 2022-03-23 | Stop reason: SDUPTHER

## 2021-10-07 NOTE — TELEPHONE ENCOUNTER
Patient reports Pepcid and Prilosec OTC are causing nausea and a stomach ache which keeps him awake at night.      I discussed with him that the EGD shows reflux esophagitis and that we will need to repeat the EGD in 12 weeks to check for healing and that he needs to be on a daily PPI.   He reports he has never had reflux and is asking if it is necessary to take a PPI.    trc  Discussed recommendations with patient.  He reports he is feeling fine today and plans to continue the PPI and will get the repeat EGD in 12 weeks.  He will let us know if the sx continue and we can change his PPI. pk

## 2021-10-13 ENCOUNTER — PREP FOR SURGERY (OUTPATIENT)
Dept: SURGERY | Facility: SURGERY CENTER | Age: 56
End: 2021-10-13

## 2021-10-13 DIAGNOSIS — K21.00 GASTROESOPHAGEAL REFLUX DISEASE WITH ESOPHAGITIS, UNSPECIFIED WHETHER HEMORRHAGE: Primary | ICD-10-CM

## 2021-10-18 ENCOUNTER — OFFICE VISIT (OUTPATIENT)
Dept: SURGERY | Facility: CLINIC | Age: 56
End: 2021-10-18

## 2021-10-18 VITALS — HEIGHT: 69 IN | BODY MASS INDEX: 33.03 KG/M2 | WEIGHT: 223 LBS

## 2021-10-18 DIAGNOSIS — K40.90 LEFT INGUINAL HERNIA: Primary | ICD-10-CM

## 2021-10-18 PROCEDURE — 99203 OFFICE O/P NEW LOW 30 MIN: CPT | Performed by: SURGERY

## 2021-10-18 RX ORDER — GUAIFENESIN 1200 MG/1
1200 TABLET, EXTENDED RELEASE ORAL 2 TIMES DAILY PRN
COMMUNITY
End: 2021-12-07 | Stop reason: HOSPADM

## 2021-10-18 RX ORDER — CEFAZOLIN SODIUM 2 G/100ML
2 INJECTION, SOLUTION INTRAVENOUS ONCE
Status: CANCELLED | OUTPATIENT
Start: 2021-12-07 | End: 2021-10-18

## 2021-10-18 NOTE — PROGRESS NOTES
Cc: Left inguinal hernia    History of presenting illness:   This is a nice 56-year-old gentleman who says that in June of this year he noticed a bulge in the left groin.  It is associated with mild pain initially, but since September has been more painful.  The pain extends down towards the testicle.  No change in bowel or bladder habits.  No prior hernia surgery.  He does have a prior history of what sounds like transient ischemic attack and has been taking Plavix for the last several years.    Past Medical History: Amaurosis fugax, transient ischemic attack (no residual symptoms), type 2 diabetes (newly diagnosed), history of anal fissure, depression, hyperlipidemia    Past Surgical History: Significant for recent upper and lower endoscopy (September 2021, appendectomy 2005, right knee reconstruction    Medications: Plavix, omeprazole, Ozempic, Singulair, fluticasone, atorvastatin    Allergies: None known    Social History: Patient smokes cigars occasionally    Family History: Colon cancer in an uncle.    Review of Systems:  Constitutional: Negative for fever, chills, change in weight  Neck: no swollen glands or dysphagia or odynophagia  Respiratory: negative for SOB, cough, hemoptysis or wheezing  Cardiovascular: negative for chest pain, palpitations or peripheral edema  Gastrointestinal: Negative for nausea, vomiting, abdominal pain      Physical Exam:  BMI: 32.9  General: alert and oriented, appropriate, no acute distress  Eyes: No scleral icterus, extraocular movements are intact  Neck: Supple without lymphadenopathy or thyromegaly, trachea is in the midline  Respiratory: There is good bilateral chest expansion, no use of accessory muscles is noted  Cardiovascular: No jugular venous distention or peripheral edema is seen  Gastrointestinal: Soft, no ventral or umbilical hernia, no mass  Genitourinary: Confirmed positive left inguinal hernia, moderate size, suspect indirect.  No hernia felt on the  right.    Laboratory data: Hemoglobin A1c drawn about 2 weeks ago 5.7.    Imaging data: None relevant      Assessment and plan:   -Initial symptomatic left inguinal hernia  -I have recommended proceeding with da Ivon robot-assisted left inguinal hernia repair with mesh placement.    Risks associated with the procedure are noted to include, but not be limited to, bleeding, infection, injury to small or large intestine, major vascular structures, the bladder or ureters.  Possibility of postoperative inguinodynia, mesh migration or infection, hernia recurrence and seroma formation also discussed.      Luis Fernando Son MD, FACS  General, Minimally Invasive and Endoscopic Surgery  [facility]    [address], [address]  [place], [place], [address]  P: [phone]  F: [phone]

## 2021-11-09 ENCOUNTER — IMMUNIZATION (OUTPATIENT)
Dept: VACCINE CLINIC | Facility: HOSPITAL | Age: 56
End: 2021-11-09

## 2021-11-09 PROCEDURE — 0004A ADM SARSCOV2 30MCG/0.3ML BOOSTER: CPT | Performed by: INTERNAL MEDICINE

## 2021-11-09 PROCEDURE — 91300 HC SARSCOV02 VAC 30MCG/0.3ML IM: CPT | Performed by: INTERNAL MEDICINE

## 2021-12-03 ENCOUNTER — PRE-ADMISSION TESTING (OUTPATIENT)
Dept: PREADMISSION TESTING | Facility: HOSPITAL | Age: 56
End: 2021-12-03

## 2021-12-03 VITALS
HEART RATE: 70 BPM | DIASTOLIC BLOOD PRESSURE: 93 MMHG | HEIGHT: 70 IN | BODY MASS INDEX: 31.21 KG/M2 | SYSTOLIC BLOOD PRESSURE: 140 MMHG | RESPIRATION RATE: 18 BRPM | TEMPERATURE: 98 F | OXYGEN SATURATION: 99 % | WEIGHT: 218 LBS

## 2021-12-03 DIAGNOSIS — K40.90 LEFT INGUINAL HERNIA: ICD-10-CM

## 2021-12-03 LAB
ANION GAP SERPL CALCULATED.3IONS-SCNC: 11.7 MMOL/L (ref 5–15)
BASOPHILS # BLD AUTO: 0.05 10*3/MM3 (ref 0–0.2)
BASOPHILS NFR BLD AUTO: 0.8 % (ref 0–1.5)
BUN SERPL-MCNC: 10 MG/DL (ref 6–20)
BUN/CREAT SERPL: 13.5 (ref 7–25)
CALCIUM SPEC-SCNC: 9.2 MG/DL (ref 8.6–10.5)
CHLORIDE SERPL-SCNC: 102 MMOL/L (ref 98–107)
CO2 SERPL-SCNC: 22.3 MMOL/L (ref 22–29)
CREAT SERPL-MCNC: 0.74 MG/DL (ref 0.76–1.27)
DEPRECATED RDW RBC AUTO: 38.9 FL (ref 37–54)
EOSINOPHIL # BLD AUTO: 0.06 10*3/MM3 (ref 0–0.4)
EOSINOPHIL NFR BLD AUTO: 0.9 % (ref 0.3–6.2)
ERYTHROCYTE [DISTWIDTH] IN BLOOD BY AUTOMATED COUNT: 12.1 % (ref 12.3–15.4)
GFR SERPL CREATININE-BSD FRML MDRD: 109 ML/MIN/1.73
GLUCOSE SERPL-MCNC: 101 MG/DL (ref 65–99)
HCT VFR BLD AUTO: 41.6 % (ref 37.5–51)
HGB BLD-MCNC: 14.9 G/DL (ref 13–17.7)
IMM GRANULOCYTES # BLD AUTO: 0.03 10*3/MM3 (ref 0–0.05)
IMM GRANULOCYTES NFR BLD AUTO: 0.5 % (ref 0–0.5)
LYMPHOCYTES # BLD AUTO: 1.65 10*3/MM3 (ref 0.7–3.1)
LYMPHOCYTES NFR BLD AUTO: 25.7 % (ref 19.6–45.3)
MCH RBC QN AUTO: 32 PG (ref 26.6–33)
MCHC RBC AUTO-ENTMCNC: 35.8 G/DL (ref 31.5–35.7)
MCV RBC AUTO: 89.3 FL (ref 79–97)
MONOCYTES # BLD AUTO: 0.58 10*3/MM3 (ref 0.1–0.9)
MONOCYTES NFR BLD AUTO: 9 % (ref 5–12)
NEUTROPHILS NFR BLD AUTO: 4.05 10*3/MM3 (ref 1.7–7)
NEUTROPHILS NFR BLD AUTO: 63.1 % (ref 42.7–76)
NRBC BLD AUTO-RTO: 0 /100 WBC (ref 0–0.2)
PLATELET # BLD AUTO: 193 10*3/MM3 (ref 140–450)
PMV BLD AUTO: 10.5 FL (ref 6–12)
POTASSIUM SERPL-SCNC: 4.2 MMOL/L (ref 3.5–5.2)
RBC # BLD AUTO: 4.66 10*6/MM3 (ref 4.14–5.8)
SODIUM SERPL-SCNC: 136 MMOL/L (ref 136–145)
WBC NRBC COR # BLD: 6.42 10*3/MM3 (ref 3.4–10.8)

## 2021-12-03 PROCEDURE — 93005 ELECTROCARDIOGRAM TRACING: CPT

## 2021-12-03 PROCEDURE — 85025 COMPLETE CBC W/AUTO DIFF WBC: CPT

## 2021-12-03 PROCEDURE — 80048 BASIC METABOLIC PNL TOTAL CA: CPT

## 2021-12-03 PROCEDURE — 93010 ELECTROCARDIOGRAM REPORT: CPT | Performed by: INTERNAL MEDICINE

## 2021-12-03 PROCEDURE — 36415 COLL VENOUS BLD VENIPUNCTURE: CPT

## 2021-12-03 NOTE — DISCHARGE INSTRUCTIONS
Arrive to hospital on your day of surgery at 6AM    Take the following medications the morning of surgery:  NONE      If you are on prescription narcotic pain medication to control your pain you may also take that medication the morning of surgery.    General Instructions:  • Do not eat solid food after midnight the night before surgery.  • You may drink clear liquids day of surgery but must stop at least one hour before your hospital arrival time.  • It is beneficial for you to have a clear drink that contains carbohydrates the day of surgery.  We suggest a 12 to 20 ounce bottle of Gatorade or Powerade for non-diabetic patients or a 12 to 20 ounce bottle of G2 or Powerade Zero for diabetic patients. (Pediatric patients, are not advised to drink a 12 to 20 ounce carbohydrate drink)    Clear liquids are liquids you can see through.  Nothing red in color.     Plain water                               Sports drinks  Sodas                                   Gelatin (Jell-O)  Fruit juices without pulp such as white grape juice and apple juice  Popsicles that contain no fruit or yogurt  Tea or coffee (no cream or milk added)  Gatorade / Powerade  G2 / Powerade Zero    • Infants may have breast milk up to four hours before surgery.  • Infants drinking formula may drink formula up to six hours before surgery.   • Patients who avoid smoking, chewing tobacco and alcohol for 4 weeks prior to surgery have a reduced risk of post-operative complications.  Quit smoking as many days before surgery as you can.  • Do not smoke, use chewing tobacco or drink alcohol the day of surgery.   • If applicable bring your C-PAP/ BI-PAP machine.  • Bring any papers given to you in the doctor’s office.  • Wear clean comfortable clothes.  • Do not wear contact lenses, false eyelashes or make-up.  Bring a case for your glasses.   • Bring crutches or walker if applicable.  • Remove all piercings.  Leave jewelry and any other valuables at  home.  • Hair extensions with metal clips must be removed prior to surgery.  • The Pre-Admission Testing nurse will instruct you to bring medications if unable to obtain an accurate list in Pre-Admission Testing.        If you were given a blood bank ID arm band remember to bring it with you the day of surgery.    Preventing a Surgical Site Infection:  • For 2 to 3 days before surgery, avoid shaving with a razor because the razor can irritate skin and make it easier to develop an infection.    • Any areas of open skin can increase the risk of a post-operative wound infection by allowing bacteria to enter and travel throughout the body.  Notify your surgeon if you have any skin wounds / rashes even if it is not near the expected surgical site.  The area will need assessed to determine if surgery should be delayed until it is healed.  • The night prior to surgery shower using a fresh bar of anti-bacterial soap (such as Dial) and clean washcloth.  Sleep in a clean bed with clean clothing.  Do not allow pets to sleep with you.  • Shower on the morning of surgery using a fresh bar of anti-bacterial soap (such as Dial) and clean washcloth.  Dry with a clean towel and dress in clean clothing.  • Ask your surgeon if you will be receiving antibiotics prior to surgery.  • Make sure you, your family, and all healthcare providers clean their hands with soap and water or an alcohol based hand  before caring for you or your wound.    Day of surgery:  Your arrival time is approximately two hours before your scheduled surgery time.  Upon arrival, a Pre-op nurse and Anesthesiologist will review your health history, obtain vital signs, and answer questions you may have.  The only belongings needed at this time will be a list of your home medications and if applicable your C-PAP/BI-PAP machine.  A Pre-op nurse will start an IV and you may receive medication in preparation for surgery, including something to help you relax.      Please be aware that surgery does come with discomfort.  We want to make every effort to control your discomfort so please discuss any uncontrolled symptoms with your nurse.   Your doctor will most likely have prescribed pain medications.      If you are going home after surgery you will receive individualized written care instructions before being discharged.  A responsible adult must drive you to and from the hospital on the day of your surgery and stay with you for 24 hours.  Discharge prescriptions can be filled by the hospital pharmacy during regular pharmacy hours.  If you are having surgery late in the day/evening your prescription may be e-prescribed to your pharmacy.  Please verify your pharmacy hours or chose a 24 hour pharmacy to avoid not having access to your prescription because your pharmacy has closed for the day.    If you are staying overnight following surgery, you will be transported to your hospital room following the recovery period.  [facility] has all private rooms.    If you have any questions please call Pre-Admission Testing at [phone].  Deductibles and co-payments are collected on the day of service. Please be prepared to pay the required co-pay, deductible or deposit on the day of service as defined by your plan.    Patient Education for Self-Quarantine Process    • Following your COVID testing, we strongly recommend that you wear a mask when you are with other people and practice social distancing.   • Limit your activities to only required outings.  • Wash your hands with soap and water frequently for at least 20 seconds.   • Avoid touching your eyes, nose and mouth with unwashed hands.  • Do not share anything - utensils, drinking glasses, food from the same bowl.   • Sanitize household surfaces daily. Include all high touch areas (door handles, light switches, phones, countertops, etc.)    Call your surgeon immediately if you experience any of the following  symptoms:  • Sore Throat  • Shortness of Breath or difficulty breathing  • Cough  • Chills  • Body soreness or muscle pain  • Headache  • Fever  • New loss of taste or smell  Do not arrive for your surgery ill.  Your procedure will need to be rescheduled to another time.  You will need to call your physician before the day of surgery to avoid any unnecessary exposure to hospital staff as well as other patients.      CHLORHEXIDINE CLOTH INSTRUCTIONS  The morning of surgery follow these instructions using the Chlorhexidine cloths you've been given.  These steps reduce bacteria on the body.  Do not use the cloths near your eyes, ears mouth, genitalia or on open wounds.  Throw the cloths away after use but do not try to flush them down a toilet.      • Open and remove one cloth at a time from the package.    • Leave the cloth unfolded and begin the bathing.  • Massage the skin with the cloths using gentle pressure to remove bacteria.  Do not scrub harshly.   • Follow the steps below with one 2% CHG cloth per area (6 total cloths).  • One cloth for neck, shoulders and chest.  • One cloth for both arms, hands, fingers and underarms (do underarms last).  • One cloth for the abdomen followed by groin.  • One cloth for right leg and foot including between the toes.  • One cloth for left leg and foot including between the toes.  • The last cloth is to be used for the back of the neck, back and buttocks.    Allow the CHG to air dry 3 minutes on the skin which will give it time to work and decrease the chance of irritation.  The skin may feel sticky until it is dry.  Do not rinse with water or any other liquid or you will lose the beneficial effects of the CHG.  If mild skin irritation occurs, do rinse the skin to remove the CHG.  Report this to the nurse at time of admission.  Do not apply lotions, creams, ointments, deodorants or perfumes after using the clothes. Dress in clean clothes before coming to the hospital.

## 2021-12-04 ENCOUNTER — LAB (OUTPATIENT)
Dept: LAB | Facility: HOSPITAL | Age: 56
End: 2021-12-04

## 2021-12-04 DIAGNOSIS — K40.90 LEFT INGUINAL HERNIA: ICD-10-CM

## 2021-12-04 LAB — SARS-COV-2 ORF1AB RESP QL NAA+PROBE: NOT DETECTED

## 2021-12-04 PROCEDURE — U0004 COV-19 TEST NON-CDC HGH THRU: HCPCS

## 2021-12-04 PROCEDURE — C9803 HOPD COVID-19 SPEC COLLECT: HCPCS

## 2021-12-06 LAB — QT INTERVAL: 374 MS

## 2021-12-07 ENCOUNTER — ANESTHESIA (OUTPATIENT)
Dept: PERIOP | Facility: HOSPITAL | Age: 56
End: 2021-12-07

## 2021-12-07 ENCOUNTER — ANESTHESIA EVENT (OUTPATIENT)
Dept: PERIOP | Facility: HOSPITAL | Age: 56
End: 2021-12-07

## 2021-12-07 ENCOUNTER — HOSPITAL ENCOUNTER (OUTPATIENT)
Facility: HOSPITAL | Age: 56
Setting detail: HOSPITAL OUTPATIENT SURGERY
Discharge: HOME | End: 2021-12-07
Attending: SURGERY | Admitting: SURGERY

## 2021-12-07 VITALS
HEART RATE: 75 BPM | TEMPERATURE: 97.5 F | WEIGHT: 218.92 LBS | SYSTOLIC BLOOD PRESSURE: 121 MMHG | RESPIRATION RATE: 16 BRPM | HEIGHT: 69 IN | BODY MASS INDEX: 32.42 KG/M2 | DIASTOLIC BLOOD PRESSURE: 72 MMHG | OXYGEN SATURATION: 97 %

## 2021-12-07 DIAGNOSIS — K40.90 LEFT INGUINAL HERNIA: ICD-10-CM

## 2021-12-07 LAB
GLUCOSE BLDC GLUCOMTR-MCNC: 120 MG/DL (ref 70–130)
GLUCOSE BLDC GLUCOMTR-MCNC: 91 MG/DL (ref 70–130)

## 2021-12-07 PROCEDURE — C1889 IMPLANT/INSERT DEVICE, NOC: HCPCS | Performed by: SURGERY

## 2021-12-07 PROCEDURE — 25010000002 DEXAMETHASONE PER 1 MG: Performed by: NURSE ANESTHETIST, CERTIFIED REGISTERED

## 2021-12-07 PROCEDURE — 25010000002 FENTANYL CITRATE (PF) 50 MCG/ML SOLUTION: Performed by: NURSE ANESTHETIST, CERTIFIED REGISTERED

## 2021-12-07 PROCEDURE — 25010000002 PHENYLEPHRINE 10 MG/ML SOLUTION: Performed by: NURSE ANESTHETIST, CERTIFIED REGISTERED

## 2021-12-07 PROCEDURE — 0 CEFAZOLIN IN DEXTROSE 2-4 GM/100ML-% SOLUTION: Performed by: SURGERY

## 2021-12-07 PROCEDURE — C1781 MESH (IMPLANTABLE): HCPCS | Performed by: SURGERY

## 2021-12-07 PROCEDURE — 25010000002 NEOSTIGMINE 5 MG/10ML SOLUTION: Performed by: NURSE ANESTHETIST, CERTIFIED REGISTERED

## 2021-12-07 PROCEDURE — 25010000002 KETOROLAC TROMETHAMINE PER 15 MG: Performed by: NURSE ANESTHETIST, CERTIFIED REGISTERED

## 2021-12-07 PROCEDURE — 82962 GLUCOSE BLOOD TEST: CPT

## 2021-12-07 PROCEDURE — 49650 LAP ING HERNIA REPAIR INIT: CPT | Performed by: SPECIALIST/TECHNOLOGIST, OTHER

## 2021-12-07 PROCEDURE — 49650 LAP ING HERNIA REPAIR INIT: CPT | Performed by: SURGERY

## 2021-12-07 PROCEDURE — 25010000002 MIDAZOLAM PER 1 MG: Performed by: ANESTHESIOLOGY

## 2021-12-07 PROCEDURE — S0260 H&P FOR SURGERY: HCPCS | Performed by: SURGERY

## 2021-12-07 PROCEDURE — 25010000002 PROPOFOL 10 MG/ML EMULSION: Performed by: NURSE ANESTHETIST, CERTIFIED REGISTERED

## 2021-12-07 PROCEDURE — 25010000002 ONDANSETRON PER 1 MG: Performed by: NURSE ANESTHETIST, CERTIFIED REGISTERED

## 2021-12-07 PROCEDURE — 25010000002 HYDROMORPHONE PER 4 MG: Performed by: NURSE ANESTHETIST, CERTIFIED REGISTERED

## 2021-12-07 DEVICE — DEV WND/CLS CONTRL TISS STRATAFIX SPIRAL MNCRYL SH 2/0 20CM: Type: IMPLANTABLE DEVICE | Site: ABDOMEN | Status: FUNCTIONAL

## 2021-12-07 DEVICE — BARD 3DMAX MESH LEFT LARGE
Type: IMPLANTABLE DEVICE | Site: ABDOMEN | Status: FUNCTIONAL
Brand: BARD 3DMAX MESH

## 2021-12-07 RX ORDER — KETOROLAC TROMETHAMINE 30 MG/ML
INJECTION, SOLUTION INTRAMUSCULAR; INTRAVENOUS AS NEEDED
Status: DISCONTINUED | OUTPATIENT
Start: 2021-12-07 | End: 2021-12-07 | Stop reason: SURG

## 2021-12-07 RX ORDER — SODIUM CHLORIDE 0.9 % (FLUSH) 0.9 %
3 SYRINGE (ML) INJECTION EVERY 12 HOURS SCHEDULED
Status: DISCONTINUED | OUTPATIENT
Start: 2021-12-07 | End: 2021-12-07 | Stop reason: HOSPADM

## 2021-12-07 RX ORDER — SODIUM CHLORIDE 9 MG/ML
INJECTION, SOLUTION INTRAVENOUS AS NEEDED
Status: DISCONTINUED | OUTPATIENT
Start: 2021-12-07 | End: 2021-12-07 | Stop reason: HOSPADM

## 2021-12-07 RX ORDER — PROPOFOL 10 MG/ML
VIAL (ML) INTRAVENOUS AS NEEDED
Status: DISCONTINUED | OUTPATIENT
Start: 2021-12-07 | End: 2021-12-07 | Stop reason: SURG

## 2021-12-07 RX ORDER — SODIUM CHLORIDE, SODIUM LACTATE, POTASSIUM CHLORIDE, CALCIUM CHLORIDE 600; 310; 30; 20 MG/100ML; MG/100ML; MG/100ML; MG/100ML
9 INJECTION, SOLUTION INTRAVENOUS CONTINUOUS
Status: DISCONTINUED | OUTPATIENT
Start: 2021-12-07 | End: 2021-12-07 | Stop reason: HOSPADM

## 2021-12-07 RX ORDER — DIPHENHYDRAMINE HYDROCHLORIDE 50 MG/ML
12.5 INJECTION INTRAMUSCULAR; INTRAVENOUS
Status: DISCONTINUED | OUTPATIENT
Start: 2021-12-07 | End: 2021-12-07 | Stop reason: HOSPADM

## 2021-12-07 RX ORDER — FENTANYL CITRATE 50 UG/ML
50 INJECTION, SOLUTION INTRAMUSCULAR; INTRAVENOUS
Status: DISCONTINUED | OUTPATIENT
Start: 2021-12-07 | End: 2021-12-07 | Stop reason: HOSPADM

## 2021-12-07 RX ORDER — MIDAZOLAM HYDROCHLORIDE 1 MG/ML
1 INJECTION INTRAMUSCULAR; INTRAVENOUS
Status: DISCONTINUED | OUTPATIENT
Start: 2021-12-07 | End: 2021-12-07 | Stop reason: HOSPADM

## 2021-12-07 RX ORDER — EPHEDRINE SULFATE 50 MG/ML
5 INJECTION, SOLUTION INTRAVENOUS ONCE AS NEEDED
Status: DISCONTINUED | OUTPATIENT
Start: 2021-12-07 | End: 2021-12-07 | Stop reason: HOSPADM

## 2021-12-07 RX ORDER — FLUMAZENIL 0.1 MG/ML
0.2 INJECTION INTRAVENOUS AS NEEDED
Status: DISCONTINUED | OUTPATIENT
Start: 2021-12-07 | End: 2021-12-07 | Stop reason: HOSPADM

## 2021-12-07 RX ORDER — ONDANSETRON 2 MG/ML
4 INJECTION INTRAMUSCULAR; INTRAVENOUS ONCE AS NEEDED
Status: DISCONTINUED | OUTPATIENT
Start: 2021-12-07 | End: 2021-12-07 | Stop reason: HOSPADM

## 2021-12-07 RX ORDER — CEFAZOLIN SODIUM 2 G/100ML
2 INJECTION, SOLUTION INTRAVENOUS ONCE
Status: COMPLETED | OUTPATIENT
Start: 2021-12-07 | End: 2021-12-07

## 2021-12-07 RX ORDER — HYDROMORPHONE HYDROCHLORIDE 1 MG/ML
0.5 INJECTION, SOLUTION INTRAMUSCULAR; INTRAVENOUS; SUBCUTANEOUS
Status: DISCONTINUED | OUTPATIENT
Start: 2021-12-07 | End: 2021-12-07 | Stop reason: HOSPADM

## 2021-12-07 RX ORDER — OXYCODONE AND ACETAMINOPHEN 7.5; 325 MG/1; MG/1
1 TABLET ORAL EVERY 4 HOURS PRN
Status: DISCONTINUED | OUTPATIENT
Start: 2021-12-07 | End: 2021-12-07 | Stop reason: HOSPADM

## 2021-12-07 RX ORDER — HYDROCODONE BITARTRATE AND ACETAMINOPHEN 7.5; 325 MG/1; MG/1
1 TABLET ORAL ONCE AS NEEDED
Status: COMPLETED | OUTPATIENT
Start: 2021-12-07 | End: 2021-12-07

## 2021-12-07 RX ORDER — DEXAMETHASONE SODIUM PHOSPHATE 10 MG/ML
INJECTION INTRAMUSCULAR; INTRAVENOUS AS NEEDED
Status: DISCONTINUED | OUTPATIENT
Start: 2021-12-07 | End: 2021-12-07 | Stop reason: SURG

## 2021-12-07 RX ORDER — FENTANYL CITRATE 50 UG/ML
INJECTION, SOLUTION INTRAMUSCULAR; INTRAVENOUS AS NEEDED
Status: DISCONTINUED | OUTPATIENT
Start: 2021-12-07 | End: 2021-12-07 | Stop reason: SURG

## 2021-12-07 RX ORDER — NEOSTIGMINE METHYLSULFATE 0.5 MG/ML
INJECTION, SOLUTION INTRAVENOUS AS NEEDED
Status: DISCONTINUED | OUTPATIENT
Start: 2021-12-07 | End: 2021-12-07 | Stop reason: SURG

## 2021-12-07 RX ORDER — PROMETHAZINE HYDROCHLORIDE 25 MG/1
25 SUPPOSITORY RECTAL ONCE AS NEEDED
Status: DISCONTINUED | OUTPATIENT
Start: 2021-12-07 | End: 2021-12-07 | Stop reason: HOSPADM

## 2021-12-07 RX ORDER — IPRATROPIUM BROMIDE AND ALBUTEROL SULFATE 2.5; .5 MG/3ML; MG/3ML
3 SOLUTION RESPIRATORY (INHALATION) ONCE AS NEEDED
Status: DISCONTINUED | OUTPATIENT
Start: 2021-12-07 | End: 2021-12-07 | Stop reason: HOSPADM

## 2021-12-07 RX ORDER — SODIUM CHLORIDE 0.9 % (FLUSH) 0.9 %
3-10 SYRINGE (ML) INJECTION AS NEEDED
Status: DISCONTINUED | OUTPATIENT
Start: 2021-12-07 | End: 2021-12-07 | Stop reason: HOSPADM

## 2021-12-07 RX ORDER — NALOXONE HCL 0.4 MG/ML
0.2 VIAL (ML) INJECTION AS NEEDED
Status: DISCONTINUED | OUTPATIENT
Start: 2021-12-07 | End: 2021-12-07 | Stop reason: HOSPADM

## 2021-12-07 RX ORDER — HYDRALAZINE HYDROCHLORIDE 20 MG/ML
5 INJECTION INTRAMUSCULAR; INTRAVENOUS
Status: DISCONTINUED | OUTPATIENT
Start: 2021-12-07 | End: 2021-12-07 | Stop reason: HOSPADM

## 2021-12-07 RX ORDER — IBUPROFEN 600 MG/1
600 TABLET ORAL ONCE AS NEEDED
Status: DISCONTINUED | OUTPATIENT
Start: 2021-12-07 | End: 2021-12-07 | Stop reason: HOSPADM

## 2021-12-07 RX ORDER — ONDANSETRON 2 MG/ML
INJECTION INTRAMUSCULAR; INTRAVENOUS AS NEEDED
Status: DISCONTINUED | OUTPATIENT
Start: 2021-12-07 | End: 2021-12-07 | Stop reason: SURG

## 2021-12-07 RX ORDER — DIPHENHYDRAMINE HCL 25 MG
25 CAPSULE ORAL
Status: DISCONTINUED | OUTPATIENT
Start: 2021-12-07 | End: 2021-12-07 | Stop reason: HOSPADM

## 2021-12-07 RX ORDER — GLYCOPYRROLATE 0.2 MG/ML
INJECTION INTRAMUSCULAR; INTRAVENOUS AS NEEDED
Status: DISCONTINUED | OUTPATIENT
Start: 2021-12-07 | End: 2021-12-07 | Stop reason: SURG

## 2021-12-07 RX ORDER — FAMOTIDINE 10 MG/ML
20 INJECTION, SOLUTION INTRAVENOUS ONCE
Status: COMPLETED | OUTPATIENT
Start: 2021-12-07 | End: 2021-12-07

## 2021-12-07 RX ORDER — LIDOCAINE HYDROCHLORIDE 20 MG/ML
INJECTION, SOLUTION INFILTRATION; PERINEURAL AS NEEDED
Status: DISCONTINUED | OUTPATIENT
Start: 2021-12-07 | End: 2021-12-07 | Stop reason: SURG

## 2021-12-07 RX ORDER — HYDROMORPHONE HCL 110MG/55ML
PATIENT CONTROLLED ANALGESIA SYRINGE INTRAVENOUS AS NEEDED
Status: DISCONTINUED | OUTPATIENT
Start: 2021-12-07 | End: 2021-12-07 | Stop reason: SURG

## 2021-12-07 RX ORDER — LABETALOL HYDROCHLORIDE 5 MG/ML
5 INJECTION, SOLUTION INTRAVENOUS
Status: DISCONTINUED | OUTPATIENT
Start: 2021-12-07 | End: 2021-12-07 | Stop reason: HOSPADM

## 2021-12-07 RX ORDER — ROCURONIUM BROMIDE 10 MG/ML
INJECTION, SOLUTION INTRAVENOUS AS NEEDED
Status: DISCONTINUED | OUTPATIENT
Start: 2021-12-07 | End: 2021-12-07 | Stop reason: SURG

## 2021-12-07 RX ORDER — PHENYLEPHRINE HYDROCHLORIDE 10 MG/ML
INJECTION INTRAVENOUS AS NEEDED
Status: DISCONTINUED | OUTPATIENT
Start: 2021-12-07 | End: 2021-12-07 | Stop reason: SURG

## 2021-12-07 RX ORDER — EPHEDRINE SULFATE 50 MG/ML
INJECTION, SOLUTION INTRAVENOUS AS NEEDED
Status: DISCONTINUED | OUTPATIENT
Start: 2021-12-07 | End: 2021-12-07 | Stop reason: SURG

## 2021-12-07 RX ORDER — PROMETHAZINE HYDROCHLORIDE 25 MG/1
25 TABLET ORAL ONCE AS NEEDED
Status: DISCONTINUED | OUTPATIENT
Start: 2021-12-07 | End: 2021-12-07 | Stop reason: HOSPADM

## 2021-12-07 RX ORDER — LIDOCAINE HYDROCHLORIDE 10 MG/ML
0.5 INJECTION, SOLUTION EPIDURAL; INFILTRATION; INTRACAUDAL; PERINEURAL ONCE AS NEEDED
Status: DISCONTINUED | OUTPATIENT
Start: 2021-12-07 | End: 2021-12-07 | Stop reason: HOSPADM

## 2021-12-07 RX ORDER — MAGNESIUM HYDROXIDE 1200 MG/15ML
LIQUID ORAL AS NEEDED
Status: DISCONTINUED | OUTPATIENT
Start: 2021-12-07 | End: 2021-12-07 | Stop reason: HOSPADM

## 2021-12-07 RX ORDER — BUPIVACAINE HYDROCHLORIDE AND EPINEPHRINE 5; 5 MG/ML; UG/ML
INJECTION, SOLUTION EPIDURAL; INTRACAUDAL; PERINEURAL AS NEEDED
Status: DISCONTINUED | OUTPATIENT
Start: 2021-12-07 | End: 2021-12-07 | Stop reason: HOSPADM

## 2021-12-07 RX ORDER — HYDROCODONE BITARTRATE AND ACETAMINOPHEN 7.5; 325 MG/1; MG/1
1 TABLET ORAL EVERY 6 HOURS PRN
Qty: 25 TABLET | Refills: 0 | Status: SHIPPED | OUTPATIENT
Start: 2021-12-07 | End: 2021-12-15

## 2021-12-07 RX ADMIN — EPHEDRINE SULFATE 10 MG: 50 INJECTION INTRAVENOUS at 08:22

## 2021-12-07 RX ADMIN — GLYCOPYRROLATE 0.2 MG: 0.2 INJECTION INTRAMUSCULAR; INTRAVENOUS at 08:26

## 2021-12-07 RX ADMIN — HYDROMORPHONE HYDROCHLORIDE 1 MG: 2 INJECTION, SOLUTION INTRAMUSCULAR; INTRAVENOUS; SUBCUTANEOUS at 09:20

## 2021-12-07 RX ADMIN — NEOSTIGMINE METHYLSULFATE 5 MG: 0.5 INJECTION INTRAVENOUS at 09:18

## 2021-12-07 RX ADMIN — FENTANYL CITRATE 100 MCG: 0.05 INJECTION, SOLUTION INTRAMUSCULAR; INTRAVENOUS at 07:58

## 2021-12-07 RX ADMIN — FAMOTIDINE 20 MG: 10 INJECTION INTRAVENOUS at 06:58

## 2021-12-07 RX ADMIN — CEFAZOLIN SODIUM 2 G: 2 INJECTION, SOLUTION INTRAVENOUS at 07:50

## 2021-12-07 RX ADMIN — MIDAZOLAM 1 MG: 1 INJECTION INTRAMUSCULAR; INTRAVENOUS at 06:58

## 2021-12-07 RX ADMIN — PROPOFOL 200 MG: 10 INJECTION, EMULSION INTRAVENOUS at 08:00

## 2021-12-07 RX ADMIN — KETOROLAC TROMETHAMINE 30 MG: 30 INJECTION, SOLUTION INTRAMUSCULAR at 09:16

## 2021-12-07 RX ADMIN — SODIUM CHLORIDE, POTASSIUM CHLORIDE, SODIUM LACTATE AND CALCIUM CHLORIDE 9 ML/HR: 600; 310; 30; 20 INJECTION, SOLUTION INTRAVENOUS at 06:58

## 2021-12-07 RX ADMIN — ROCURONIUM BROMIDE 50 MG: 50 INJECTION INTRAVENOUS at 08:00

## 2021-12-07 RX ADMIN — PHENYLEPHRINE HYDROCHLORIDE 100 MCG: 10 INJECTION, SOLUTION INTRAVENOUS at 09:01

## 2021-12-07 RX ADMIN — DEXAMETHASONE SODIUM PHOSPHATE 10 MG: 10 INJECTION INTRAMUSCULAR; INTRAVENOUS at 08:06

## 2021-12-07 RX ADMIN — ONDANSETRON 4 MG: 2 INJECTION INTRAMUSCULAR; INTRAVENOUS at 09:16

## 2021-12-07 RX ADMIN — HYDROCODONE BITARTRATE AND ACETAMINOPHEN 1 TABLET: 7.5; 325 TABLET ORAL at 11:16

## 2021-12-07 RX ADMIN — LIDOCAINE HYDROCHLORIDE 100 MG: 20 INJECTION, SOLUTION INFILTRATION; PERINEURAL at 08:00

## 2021-12-07 RX ADMIN — SODIUM CHLORIDE, POTASSIUM CHLORIDE, SODIUM LACTATE AND CALCIUM CHLORIDE: 600; 310; 30; 20 INJECTION, SOLUTION INTRAVENOUS at 09:14

## 2021-12-07 RX ADMIN — GLYCOPYRROLATE 0.6 MG: 0.2 INJECTION INTRAMUSCULAR; INTRAVENOUS at 09:18

## 2021-12-07 NOTE — ANESTHESIA PREPROCEDURE EVALUATION
Anesthesia Evaluation     Patient summary reviewed and Nursing notes reviewed   history of anesthetic complications: PONV               Airway   Mallampati: II  TM distance: >3 FB  Neck ROM: full  no difficulty expected  Dental - normal exam     Pulmonary     breath sounds clear to auscultation  (+) sleep apnea,   (-) shortness of breath, decreased breath sounds, wheezes  Cardiovascular - normal exam  Exercise tolerance: good (4-7 METS)    Rhythm: regular  Rate: normal    (+) hypertension, hyperlipidemia,  carotid artery disease (Right side is graded as moderate. Left is mild) carotid bilateral  (-) past MI, angina, CHF, orthopnea, PND, SUAREZ, PVD      Neuro/Psych  (+) TIA (amaurosis fugax of right eye), CVA,     (-) seizures, neuromuscular disease, dizziness/light headedness, weakness, numbness  GI/Hepatic/Renal/Endo    (+) obesity,  GERD,  diabetes mellitus type 2,   (-) liver disease    Musculoskeletal     (+) neck pain,   Abdominal  - normal exam   Substance History - negative use  (-) alcohol use, drug use     OB/GYN negative ob/gyn ROS         Other - negative ROS                       Anesthesia Plan    ASA 3     general   (I discussed with the patient the relevant risks of general anesthesia including, but not limited to, nausea, vomiting, disorientation, post-op delirium, nerve injury, oral/dental injury, awareness, stroke, and death.  I also reviewed any clinically relevant lab and imaging results.)  intravenous induction     Anesthetic plan, all risks, benefits, and alternatives have been provided, discussed and informed consent has been obtained with: patient.    Plan discussed with CRNA.

## 2021-12-07 NOTE — ANESTHESIA POSTPROCEDURE EVALUATION
Patient: Ramirez Pena    Procedure Summary     Date: 12/07/21 Room / Location: [facility] OR  / [facility] MAIN OR    Anesthesia Start: 0755 Anesthesia Stop: 0939    Procedure: left INGUINAL HERNIA REPAIR LAPAROSCOPIC WITH DAVINCI ROBOT (Left Abdomen) Diagnosis:       Left inguinal hernia      (Left inguinal hernia [K40.90])    Surgeons: Luis Fernando Son MD Provider: Familia Fish MD    Anesthesia Type: general ASA Status: 3          Anesthesia Type: general    Vitals  Vitals Value Taken Time   /93 12/07/21 1006   Temp 36.4 °C (97.5 °F) 12/07/21 0936   Pulse 68 12/07/21 1013   Resp 14 12/07/21 1005   SpO2 95 % 12/07/21 1013   Vitals shown include unvalidated device data.        Post Anesthesia Care and Evaluation    Patient location during evaluation: PACU  Patient participation: complete - patient participated  Level of consciousness: awake and alert  Pain management: adequate  Airway patency: patent  Anesthetic complications: No anesthetic complications    Cardiovascular status: acceptable  Respiratory status: acceptable  Hydration status: acceptable    Comments: --------------------            12/07/21               1046     --------------------   BP:       128/81     Pulse:      66       Resp:       16       Temp:                SpO2:      95%      --------------------

## 2021-12-07 NOTE — H&P
Cc: Left inguinal hernia     History of presenting illness:   This is a nice 56-year-old gentleman who says that in June of this year he noticed a bulge in the left groin.  It is associated with mild pain initially, but since September has been more painful.  The pain extends down towards the testicle.  No change in bowel or bladder habits.  No prior hernia surgery.  He does have a prior history of what sounds like transient ischemic attack and has been taking Plavix for the last several years.     Past Medical History: Amaurosis fugax, transient ischemic attack (no residual symptoms), type 2 diabetes (newly diagnosed), history of anal fissure, depression, hyperlipidemia     Past Surgical History: Significant for recent upper and lower endoscopy (September 2021), appendectomy 2005, right knee reconstruction     Medications: Plavix, omeprazole, Ozempic, Singulair, fluticasone, atorvastatin     Allergies: None known     Social History: Patient smokes cigars occasionally     Family History: Colon cancer in an uncle.     Review of Systems:  Constitutional: Negative for fever, chills, change in weight  Neck: no swollen glands or dysphagia or odynophagia  Respiratory: negative for SOB, cough, hemoptysis or wheezing  Cardiovascular: negative for chest pain, palpitations or peripheral edema  Gastrointestinal: Negative for nausea, vomiting, abdominal pain        Physical Exam:  BMI: 32.9  General: alert and oriented, appropriate, no acute distress  Eyes: No scleral icterus, extraocular movements are intact  Neck: Supple without lymphadenopathy or thyromegaly, trachea is in the midline  Respiratory: There is good bilateral chest expansion, no use of accessory muscles is noted  Cardiovascular: No jugular venous distention or peripheral edema is seen  Gastrointestinal: Soft, no ventral or umbilical hernia, no mass  Genitourinary: Confirmed positive left inguinal hernia, moderate size, suspect indirect.  No hernia felt on the  right.     Laboratory data: Hemoglobin A1c drawn about 2 weeks ago 5.7.     Imaging data: None relevant        Assessment and plan:   -Initial symptomatic left inguinal hernia  -I have recommended proceeding with da Ivon robot-assisted left inguinal hernia repair with mesh placement.     Risks associated with the procedure are noted to include, but not be limited to, bleeding, infection, injury to small or large intestine, major vascular structures, the bladder or ureters.  Possibility of postoperative inguinodynia, mesh migration or infection, hernia recurrence and seroma formation also discussed.        Luis Fernando Son MD, FACS  General, Minimally Invasive and Endoscopic Surgery  [facility]     [address], [address]  [place], [place], [address]  P: [phone]  F: [phone]

## 2021-12-07 NOTE — OP NOTE
Operative Note :   MD Ramirez Izaguirre  1965    Procedure Date: 12/07/21    Pre-op Diagnosis:  Left inguinal hernia [K40.90]    Post-Op Diagnosis:  Same    Procedure:   · Laparoscopic left inguinal hernia repair with da Ivon robot assistance    Surgeon: Luis Fernando Son MD    Assistant: Assistant: Jeff José CSA was responsible for performing the following activities: Retraction, Suction, Irrigation, Suturing, Closing and Held/Positioned Camera and their skilled assistance was necessary for the success of this case.    Anesthesia:  General (general endotracheal tube)    EBL:   minimal    Specimens:   None    Indications:  · 56-year-old gentleman with an initial reducible left inguinal hernia    Findings:   · Moderate sized indirect defect on the left with sigmoid colon within the defect  · No hernia appreciated on the right    Recommendations:   · Routine post hernia care    Description of procedure:    After obtaining full consent, patient was brought to the operating room placed under a general anesthetic. Parmar catheter was placed. Patient was positioned appropriately and his abdomen was then clipped and sterilely prepped and draped. Supraumbilical skin incision was made dissected through the subcutaneous tissue onto the fascia. Fascia was incised in the midline and #1 Vicryl suture was placed on each side of the fascia in a U stitch pattern. The peritoneum was bluntly penetrated. 8 mm robotic Wilson trocar was introduced and the abdomen was insufflated with CO2. Cursory examination was unremarkable other than the finding of the moderate sized indirect defect seen on the left. No hernia was seen on the right. Additional 8 mm trochars were introduced at the level of the supraumbilical trocar on the left and right. The da Ivon Xi was then brought up and docked. Patient was positioned with his head down about 10 degrees. I then moved to the console. The peritoneum was then incised  just above and medial to the anterior superior iliac spine and this peritoneal incision was carried across medially to the level of the obliterated median umbilical vein. Preperitoneal space was then developed, first working medially getting down onto Jordon's ligament including the medial space completely. I then worked out lateral to the inguinal canal and dissected the space. I then began working the hernia sac out of the inguinal canal and was able to free this up and separate the hernia sac from the cord structures. The sac was peeled down, allowing for plenty of space to lay the mesh. The hernia sac was then inverted. The large Bard 10 x 16 cm preshaped heavyweight 3D mesh was then introduced and oriented appropriately. I then secured the mesh against Jordon's ligament medially with 2-0 Vicryl. The mesh was then secured against the anterior abdominal wall again using 2-0 Vicryl, both medial and lateral to the epigastric vessels. The peritoneal flap was then lifted up and I ensured it did not interfere with the inferior aspect of the mesh. The peritoneal flap was then closed with a running barbed 2-0 Monocryl suture, incorporating the hernia sac into the closure. Needles were retrieved. The da Ivon robot was then undocked. Trochars were withdrawn and the abdomen was desufflated. The supraumbilical fascial incision was closed with #1 Vicryl suture. Skin incisions were closed with 4-0 Monocryl.    Luis Fernando Son MD  General and Endoscopic Surgery  [facility]    [address], [address]  [place], [place], [address]  P: [phone]  F: [phone]

## 2021-12-07 NOTE — ANESTHESIA PROCEDURE NOTES
Airway  Urgency: elective    Date/Time: 12/7/2021 8:04 AM  Airway not difficult    General Information and Staff    Patient location during procedure: OR  Anesthesiologist: Familia Fish MD  CRNA: Maia Reyes CRNA    Indications and Patient Condition  Indications for airway management: airway protection    Preoxygenated: yes  MILS maintained throughout  Mask difficulty assessment: 2 - vent by mask + OA or adjuvant +/- NMBA    Final Airway Details  Final airway type: endotracheal airway      Successful airway: ETT  Cuffed: yes   Successful intubation technique: direct laryngoscopy  Facilitating devices/methods: cricoid pressure and Bougie  Endotracheal tube insertion site: oral  Blade: Bhardwaj  Blade size: 2  ETT size (mm): 8.0  Cormack-Lehane Classification: grade IIb - view of arytenoids or posterior of glottis only  Placement verified by: chest auscultation and capnometry   Cuff volume (mL): 10  Measured from: teeth  ETT/EBT  to teeth (cm): 22  Number of attempts at approach: 1  Assessment: lips, teeth, and gum same as pre-op and atraumatic intubation

## 2021-12-09 ENCOUNTER — OFFICE VISIT (OUTPATIENT)
Dept: SLEEP MEDICINE | Facility: HOSPITAL | Age: 56
End: 2021-12-09

## 2021-12-09 VITALS
WEIGHT: 214 LBS | HEART RATE: 83 BPM | SYSTOLIC BLOOD PRESSURE: 117 MMHG | BODY MASS INDEX: 31.7 KG/M2 | DIASTOLIC BLOOD PRESSURE: 74 MMHG | OXYGEN SATURATION: 96 % | HEIGHT: 69 IN

## 2021-12-09 DIAGNOSIS — E66.09 CLASS 1 OBESITY DUE TO EXCESS CALORIES WITHOUT SERIOUS COMORBIDITY WITH BODY MASS INDEX (BMI) OF 31.0 TO 31.9 IN ADULT: ICD-10-CM

## 2021-12-09 DIAGNOSIS — G47.33 OSA ON CPAP: Primary | ICD-10-CM

## 2021-12-09 DIAGNOSIS — Z99.89 OSA ON CPAP: Primary | ICD-10-CM

## 2021-12-09 PROCEDURE — 99213 OFFICE O/P EST LOW 20 MIN: CPT | Performed by: INTERNAL MEDICINE

## 2021-12-09 PROCEDURE — G0463 HOSPITAL OUTPT CLINIC VISIT: HCPCS

## 2021-12-09 NOTE — PROGRESS NOTES
"  [facility]  [address]  [address]rd [place] [address]  Phone   Fax       SLEEP CLINIC FOLLOW UP PROGRESS NOTE.    Ramirez Pena  1965  56 y.o.  male      PCP: Carmina Awad APRN      Date of visit: 12/9/2021    Chief Complaint   Patient presents with   • Sleep Apnea   • Obesity       HPI:  This is a 56 y.o. years old patient who has a history of obstructive sleep apnea is here for  the annual compliance follow-up.  Sleep apnea is mild in severity with a AHI of 12.5/hr. Patient is using positive airway pressure therapy with auto CPAP and the symptoms of snoring, non-restorative sleep and daytime excessive sleepiness have improved significantly on the therapy. Normally goes to bed at 10:30 PM and wakes up at 6 AM.  The patient wakes up 1 time(s) during the night and has no problem going back to sleep.  Feels refreshed after waking up.  Patient also denies headaches and nasal congestion.   Recently underwent inguinal hernia surgery and recovering well    Medications and allergies are reviewed by me and documented in the encounter.     SOCIAL ( habits pertaining to sleep medicine)  History tobacco use:Yes   History of alcohol use: 0 per week  Caffeine use: 5     REVIEW OF SYSTEMS:   [place] Sleepiness Scale :Total score: 2   Nasal congestion:Yes   Dry mouth/nose:No   Post nasal drip; No   Acid reflux/Heartburn:No   Abd bloating:No   Morning headache:No   Anxiety:No   Depression:No    PHYSICAL EXAMINATION:  CONSTITUTIONAL:  Vitals:    12/09/21 1543   BP: 117/74   Pulse: 83   SpO2: 96%   Weight: 97.1 kg (214 lb)   Height: 175.3 cm (69\")    Body mass index is 31.6 kg/m².   NOSE: nasal passages are clear, no nasal polyps, septum in the midline.  THROAT: throat is clear, oral airway Mallampati class 3  RESP SYSTEM: Breath sounds are normal, no wheezes or crackles  CARDIOVASULAR: Heart rate is regular without murmur. No edema      Data reviewed:  The Smart " card downloaded on 12/9/2021 has been reviewed independently by me for compliance and discussed the data with the patient.   Compliance; 100%  More than 4 hr use, 100%  Average use of the device 7 hours and 22 minutes per night  Residual AHI: 1.5 /hr (goal < 5.0 /hr)  Mask type: Nasal mask  DME: [employer]      ASSESSMENT AND PLAN:  · Obstructive sleep apnea ( G 47.33).  The symptoms of sleep apnea have improved with the device and the treatment.  Patient's compliance with the device is excellent for treatment of sleep apnea.  I have independently reviewed the smart card down load and discussed with the patient the download data and encouarged the patient to continue to use the device.The residual AHI is acceptable. The device is benefiting the patient and the device is medically necessary.  Without proper control of sleep apnea and good compliance there is a increased risk for hypertension, diabetes mellitus and nonrestorative sleep with hypersomnia which can increase risk for motor vehicle accidents.  Untreated sleep apnea is also a risk factor for development of atrial fibrillation, pulmonary hypertension and stroke. The patient is also instructed to get the supplies from the [facility] and and change them on a regular basis.  A prescription for supplies has been sent to the [facility].  I have also discussed the good sleep hygiene habits and adequate amount of sleep needed for good health.  · Obesity, class 1 with BMI is Body mass index is 31.6 kg/m².. I have discuss the relationship between the weight and sleep apnea. The benefit of weight loss in reducing severity of sleep apnea was discussed. Discussed diet and exercise with the patient to achieve ideal BMI.   · Return in about 1 year (around 12/9/2022) for Annual visit with smartcard download. . Patient's questions were answered.        Kameron Nolasco MD  Sleep Medicine.  Medical Director, [facility] sleep [facility]  12/9/2021 ,

## 2021-12-15 ENCOUNTER — OFFICE VISIT (OUTPATIENT)
Dept: SURGERY | Facility: CLINIC | Age: 56
End: 2021-12-15

## 2021-12-15 DIAGNOSIS — K40.90 LEFT INGUINAL HERNIA: Primary | ICD-10-CM

## 2021-12-15 PROCEDURE — 99024 POSTOP FOLLOW-UP VISIT: CPT | Performed by: SURGERY

## 2021-12-15 NOTE — PROGRESS NOTES
Postop da Ivon robot-assisted left inguinal hernia repair with mesh    Subjective:  Doing well, no specific complaints, pain seems adequately controlled.    Objective:  General: Awake and alert without distress  Abdomen: Soft and benign, trocar sites healing nicely  Genitourinary: Minimal and appropriate groin tenderness, no evidence of hernia recurrence    Assessment and plan:  -Patient is about 8 days out from da Ivon robot-assisted left inguinal hernia repair with mesh, recovering appropriately  -Gradually increase activities after 2 weeks  -He is already back at work (his job does not require any lifting or straining)  -He may follow-up here as needed    Luis Fernando Son MD  General and Endoscopic Surgery  [facility]    [address], [address]  [place], [place], [address]  P: [phone]  F: [phone]

## 2022-01-08 RX ORDER — ATORVASTATIN CALCIUM 80 MG/1
80 TABLET, FILM COATED ORAL NIGHTLY
Qty: 30 TABLET | Refills: 11 | Status: CANCELLED | OUTPATIENT
Start: 2022-01-08

## 2022-01-10 RX ORDER — ATORVASTATIN CALCIUM 80 MG/1
80 TABLET, FILM COATED ORAL NIGHTLY
Qty: 30 TABLET | Refills: 6 | Status: SHIPPED | OUTPATIENT
Start: 2022-01-10 | End: 2022-08-14 | Stop reason: SDUPTHER

## 2022-03-23 DIAGNOSIS — E66.1 CLASS 1 DRUG-INDUCED OBESITY WITH BODY MASS INDEX (BMI) OF 32.0 TO 32.9 IN ADULT, UNSPECIFIED WHETHER SERIOUS COMORBIDITY PRESENT: ICD-10-CM

## 2022-03-23 DIAGNOSIS — R73.09 ELEVATED GLUCOSE: ICD-10-CM

## 2022-03-24 RX ORDER — SEMAGLUTIDE 1.34 MG/ML
0.25 INJECTION, SOLUTION SUBCUTANEOUS WEEKLY
Qty: 1.5 ML | Refills: 2 | Status: SHIPPED | OUTPATIENT
Start: 2022-03-24 | End: 2022-12-30

## 2022-03-24 NOTE — TELEPHONE ENCOUNTER
Rx Refill Note  Requested Prescriptions     Pending Prescriptions Disp Refills   • Semaglutide,0.25 or 0.5MG/DOS, (Ozempic, 0.25 or 0.5 MG/DOSE,) 2 MG/1.5ML solution pen-injector 1 pen 2     Sig: Inject 0.25 mg under the skin into the appropriate area as directed 1 (One) Time Per Week.      Last office visit with prescribing clinician: 10/6/2021      Next office visit with prescribing clinician: Visit date not found       {TIP  Please add Last Relevant Lab Date if appropriate: 10/06/21    Meghan Myrick MA  03/24/22, 07:45 EDT

## 2022-05-09 RX ORDER — CLOPIDOGREL BISULFATE 75 MG/1
75 TABLET ORAL DAILY
Qty: 90 TABLET | Refills: 2 | Status: SHIPPED | OUTPATIENT
Start: 2022-05-09 | End: 2023-02-05 | Stop reason: SDUPTHER

## 2022-05-09 NOTE — TELEPHONE ENCOUNTER
WARNING REGARDING PT TAKING OMEPRAZOLE WOULD NOT LET ME APPROVE THIS MEDICATION.    PLEASE REVIEW    THANK YOU

## 2022-06-08 ENCOUNTER — TELEPHONE (OUTPATIENT)
Dept: NEUROLOGY | Facility: CLINIC | Age: 57
End: 2022-06-08

## 2022-06-08 NOTE — TELEPHONE ENCOUNTER
Can you please put in a new order for a Duplex Carotid Ultrasound. I closed the order (US carotid bilateral). That is the incorrect order.    Thanks!

## 2022-06-10 ENCOUNTER — DOCUMENTATION (OUTPATIENT)
Dept: NEUROLOGY | Facility: CLINIC | Age: 57
End: 2022-06-10

## 2022-06-10 DIAGNOSIS — I65.21 STENOSIS OF RIGHT CAROTID ARTERY: Primary | ICD-10-CM

## 2022-06-16 DIAGNOSIS — I65.21 STENOSIS OF RIGHT CAROTID ARTERY: Primary | ICD-10-CM

## 2022-06-27 ENCOUNTER — HOSPITAL ENCOUNTER (OUTPATIENT)
Dept: CARDIOLOGY | Facility: HOSPITAL | Age: 57
Discharge: HOME | End: 2022-06-27
Admitting: NURSE PRACTITIONER

## 2022-06-27 DIAGNOSIS — I65.21 STENOSIS OF RIGHT CAROTID ARTERY: ICD-10-CM

## 2022-06-27 LAB
BH CV XLRA MEAS LEFT DIST CCA EDV: -29.9 CM/SEC
BH CV XLRA MEAS LEFT DIST CCA PSV: -102 CM/SEC
BH CV XLRA MEAS LEFT DIST ICA EDV: -35.2 CM/SEC
BH CV XLRA MEAS LEFT DIST ICA PSV: -86.8 CM/SEC
BH CV XLRA MEAS LEFT ICA/CCA RATIO: 0.85
BH CV XLRA MEAS LEFT MID ICA EDV: -24.8 CM/SEC
BH CV XLRA MEAS LEFT MID ICA PSV: -69.5 CM/SEC
BH CV XLRA MEAS LEFT PROX CCA EDV: 22.8 CM/SEC
BH CV XLRA MEAS LEFT PROX CCA PSV: 103 CM/SEC
BH CV XLRA MEAS LEFT PROX ECA EDV: -20.5 CM/SEC
BH CV XLRA MEAS LEFT PROX ECA PSV: -106 CM/SEC
BH CV XLRA MEAS LEFT PROX ICA EDV: -25.9 CM/SEC
BH CV XLRA MEAS LEFT PROX ICA PSV: -65.2 CM/SEC
BH CV XLRA MEAS LEFT PROX SCLA PSV: 142 CM/SEC
BH CV XLRA MEAS LEFT VERTEBRAL A EDV: -15.2 CM/SEC
BH CV XLRA MEAS LEFT VERTEBRAL A PSV: -47.9 CM/SEC
BH CV XLRA MEAS RIGHT DIST CCA EDV: -23.5 CM/SEC
BH CV XLRA MEAS RIGHT DIST CCA PSV: -97.3 CM/SEC
BH CV XLRA MEAS RIGHT DIST ICA EDV: -19.9 CM/SEC
BH CV XLRA MEAS RIGHT DIST ICA PSV: -66.3 CM/SEC
BH CV XLRA MEAS RIGHT ICA/CCA RATIO: 1.56
BH CV XLRA MEAS RIGHT MID ICA EDV: -24.6 CM/SEC
BH CV XLRA MEAS RIGHT MID ICA PSV: -71.5 CM/SEC
BH CV XLRA MEAS RIGHT PROX CCA EDV: 32.4 CM/SEC
BH CV XLRA MEAS RIGHT PROX CCA PSV: 138 CM/SEC
BH CV XLRA MEAS RIGHT PROX ECA EDV: -23.6 CM/SEC
BH CV XLRA MEAS RIGHT PROX ECA PSV: -119 CM/SEC
BH CV XLRA MEAS RIGHT PROX ICA EDV: -44.2 CM/SEC
BH CV XLRA MEAS RIGHT PROX ICA PSV: -152 CM/SEC
BH CV XLRA MEAS RIGHT PROX SCLA PSV: 144 CM/SEC
BH CV XLRA MEAS RIGHT VERTEBRAL A EDV: 13 CM/SEC
BH CV XLRA MEAS RIGHT VERTEBRAL A PSV: 56.6 CM/SEC
LEFT ARM BP: NORMAL MMHG
MAXIMAL PREDICTED HEART RATE: 164 BPM
RIGHT ARM BP: NORMAL MMHG
STRESS TARGET HR: 139 BPM

## 2022-06-27 PROCEDURE — 93880 EXTRACRANIAL BILAT STUDY: CPT

## 2022-06-28 ENCOUNTER — TELEPHONE (OUTPATIENT)
Dept: NEUROLOGY | Facility: CLINIC | Age: 57
End: 2022-06-28

## 2022-07-06 ENCOUNTER — OFFICE VISIT (OUTPATIENT)
Dept: NEUROLOGY | Facility: CLINIC | Age: 57
End: 2022-07-06

## 2022-07-06 VITALS
BODY MASS INDEX: 32.3 KG/M2 | WEIGHT: 218.1 LBS | SYSTOLIC BLOOD PRESSURE: 130 MMHG | HEIGHT: 69 IN | OXYGEN SATURATION: 99 % | HEART RATE: 86 BPM | DIASTOLIC BLOOD PRESSURE: 80 MMHG

## 2022-07-06 DIAGNOSIS — I65.21 STENOSIS OF RIGHT CAROTID ARTERY: ICD-10-CM

## 2022-07-06 DIAGNOSIS — Z72.0 TOBACCO USE: ICD-10-CM

## 2022-07-06 DIAGNOSIS — Z99.89 OSA ON CPAP: ICD-10-CM

## 2022-07-06 DIAGNOSIS — Z86.73 HISTORY OF TIA (TRANSIENT ISCHEMIC ATTACK): Primary | ICD-10-CM

## 2022-07-06 DIAGNOSIS — G47.33 OSA ON CPAP: ICD-10-CM

## 2022-07-06 PROBLEM — IMO0001 CONGESTED: Status: RESOLVED | Noted: 2017-03-20 | Resolved: 2022-07-06

## 2022-07-06 PROBLEM — R53.81 MALAISE: Status: RESOLVED | Noted: 2017-03-20 | Resolved: 2022-07-06

## 2022-07-06 PROCEDURE — 99214 OFFICE O/P EST MOD 30 MIN: CPT | Performed by: NURSE PRACTITIONER

## 2022-08-15 RX ORDER — ATORVASTATIN CALCIUM 80 MG/1
80 TABLET, FILM COATED ORAL NIGHTLY
Qty: 30 TABLET | Refills: 6 | Status: SHIPPED | OUTPATIENT
Start: 2022-08-15 | End: 2023-03-12 | Stop reason: SDUPTHER

## 2022-09-20 ENCOUNTER — OFFICE VISIT (OUTPATIENT)
Dept: FAMILY MEDICINE CLINIC | Facility: CLINIC | Age: 57
End: 2022-09-20

## 2022-09-20 VITALS
RESPIRATION RATE: 18 BRPM | WEIGHT: 220.5 LBS | TEMPERATURE: 96.7 F | BODY MASS INDEX: 32.66 KG/M2 | SYSTOLIC BLOOD PRESSURE: 126 MMHG | DIASTOLIC BLOOD PRESSURE: 70 MMHG | HEIGHT: 69 IN | OXYGEN SATURATION: 98 % | HEART RATE: 65 BPM

## 2022-09-20 DIAGNOSIS — J30.1 SEASONAL ALLERGIC RHINITIS DUE TO POLLEN: Primary | ICD-10-CM

## 2022-09-20 DIAGNOSIS — B35.1 ONYCHOMYCOSIS: ICD-10-CM

## 2022-09-20 LAB
EXPIRATION DATE: NORMAL
INTERNAL CONTROL: NORMAL
Lab: NORMAL
SARS-COV-2 AG UPPER RESP QL IA.RAPID: NOT DETECTED

## 2022-09-20 PROCEDURE — 87426 SARSCOV CORONAVIRUS AG IA: CPT | Performed by: NURSE PRACTITIONER

## 2022-09-20 PROCEDURE — 99213 OFFICE O/P EST LOW 20 MIN: CPT | Performed by: NURSE PRACTITIONER

## 2022-09-20 NOTE — PROGRESS NOTES
"Chief Complaint  Nasal Congestion (X 7 days, fatigue) and Toe Pain (Left, big toes)    Subjective        Ramirez Pena presents to [facility]  History of Present Illness  Ramirez presents with 7 day history of nasal congestion. He states it is major, feels like someone is pinching his nose. He stopped singulair yesterday, as he ran out of his medication. Using flonase and astelin nasal spray. Clears if he is outside. Opened the basement windows.     Left big toe issues: thickened, has been present for a while, present on all toes but his second toe.   URI   This is a new problem. The current episode started in the past 7 days. The problem has been unchanged. There has been no fever. Associated symptoms include congestion. Pertinent negatives include no abdominal pain, chest pain, coughing, diarrhea, dysuria, ear pain, headaches, joint pain, joint swelling, nausea, neck pain, plugged ear sensation, rash, rhinorrhea, sinus pain, sneezing, sore throat, swollen glands, vomiting or wheezing. He has tried decongestant and antihistamine for the symptoms. The treatment provided moderate relief.       Objective   Vital Signs:  /70 (BP Location: Left arm, Patient Position: Sitting, Cuff Size: Adult)   Pulse 65   Temp 96.7 °F (35.9 °C) (Temporal)   Resp 18   Ht 175.3 cm (69.02\")   Wt 100 kg (220 lb 8 oz)   SpO2 98%   BMI 32.55 kg/m²   Estimated body mass index is 32.55 kg/m² as calculated from the following:    Height as of this encounter: 175.3 cm (69.02\").    Weight as of this encounter: 100 kg (220 lb 8 oz).          Physical Exam  Constitutional:       General: He is not in acute distress.     Appearance: He is well-developed. He is not diaphoretic.   HENT:      Right Ear: Tympanic membrane and ear canal normal.      Left Ear: Tympanic membrane and ear canal normal.      Nose: Mucosal edema present.      Right Sinus: No maxillary sinus tenderness or frontal sinus tenderness.    "   Left Sinus: No maxillary sinus tenderness or frontal sinus tenderness.      Mouth/Throat:      Mouth: Mucous membranes are moist.      Pharynx: Oropharynx is clear.   Eyes:      Pupils: Pupils are equal, round, and reactive to light.   Cardiovascular:      Rate and Rhythm: Normal rate and regular rhythm.      Heart sounds: Normal heart sounds. No murmur heard.    No friction rub. No gallop.   Pulmonary:      Effort: Pulmonary effort is normal. No respiratory distress.      Breath sounds: Normal breath sounds. No wheezing or rales.   Abdominal:      General: Bowel sounds are normal. There is no distension.      Palpations: Abdomen is soft.      Tenderness: There is no abdominal tenderness.   Musculoskeletal:      Cervical back: Neck supple.   Skin:     General: Skin is warm and dry.   Neurological:      Mental Status: He is alert and oriented to person, place, and time.        Result Review :                Assessment and Plan   Diagnoses and all orders for this visit:    1. Seasonal allergic rhinitis due to pollen (Primary)  -     POCT SARS-CoV-2 Antigen CHA    2. Onychomycosis      Will resume medications, add singulair,   covid is negative, would hold on antibiotics, has someone coming to his house as his symptoms improve with opening the windows and primarily occur in his home.  Onychomycosis: rx alternative topical treatment, faxed to pharmacy, follow up prn       Follow Up   No follow-ups on file.  Patient was given instructions and counseling regarding his condition or for health maintenance advice. Please see specific information pulled into the AVS if appropriate.

## 2022-12-08 ENCOUNTER — OFFICE VISIT (OUTPATIENT)
Dept: SLEEP MEDICINE | Facility: HOSPITAL | Age: 57
End: 2022-12-08

## 2022-12-08 VITALS
DIASTOLIC BLOOD PRESSURE: 84 MMHG | HEART RATE: 57 BPM | SYSTOLIC BLOOD PRESSURE: 126 MMHG | HEIGHT: 69 IN | BODY MASS INDEX: 32.44 KG/M2 | OXYGEN SATURATION: 96 % | WEIGHT: 219 LBS

## 2022-12-08 DIAGNOSIS — G47.33 OSA ON CPAP: Primary | ICD-10-CM

## 2022-12-08 DIAGNOSIS — E66.09 CLASS 1 OBESITY DUE TO EXCESS CALORIES WITHOUT SERIOUS COMORBIDITY WITH BODY MASS INDEX (BMI) OF 31.0 TO 31.9 IN ADULT: ICD-10-CM

## 2022-12-08 DIAGNOSIS — Z99.89 OSA ON CPAP: Primary | ICD-10-CM

## 2022-12-08 PROCEDURE — G0463 HOSPITAL OUTPT CLINIC VISIT: HCPCS

## 2022-12-08 PROCEDURE — 99213 OFFICE O/P EST LOW 20 MIN: CPT | Performed by: INTERNAL MEDICINE

## 2022-12-08 NOTE — PROGRESS NOTES
"  [facility]  [address]  [address]  [place] [address]  Phone   Fax       SLEEP CLINIC FOLLOW UP PROGRESS NOTE.    Ramirez Pena  [id number]   1965  57 y.o.  male      PCP: Carmina Awad APRN      Date of visit: 12/8/2022    Chief Complaint   Patient presents with   • Sleep Apnea   • Obesity       HPI:  This is a 57 y.o. years old patient is here for the management of obstructive sleep apnea.  Sleep apnea is mild in severity with a AHI of 12.5/hr. Patient is using positive airway pressure therapy with auto CPAP and the symptoms of snoring, non-restorative sleep and daytime excessive sleepiness have improved significantly on the therapy. Normally goes to bed at 10:30 PM and wakes up at 6 AM.  The patient wakes up 0 time(s) during the night and has no problem going back to sleep.  Feels refreshed after waking up.  Patient also denies headaches and nasal congestion.     Medications and allergies are reviewed by me and documented in the encounter.     SOCIAL (habits pertaining to sleep medicine)  History tobacco use:Yes   History of alcohol use: 1 per week  Caffeine use: 4     REVIEW OF SYSTEMS:   [place] Sleepiness Scale :Total score: 2   Nasal congestion:Yes   Dry mouth/nose:Yes   Post nasal drip; No   Acid reflux/Heartburn:No   Abd bloating:No   Morning headache:No   Anxiety:No   Depression:No    PHYSICAL EXAMINATION:  CONSTITUTIONAL:  Vitals:    12/08/22 1500   BP: 126/84   Pulse: 57   SpO2: 96%   Weight: 99.3 kg (219 lb)   Height: 175.3 cm (69.02\")    Body mass index is 32.33 kg/m².   NOSE: nasal passages are clear, No deformities noted   RESP SYSTEM: Not in any respiratory distress, no chest deformities noted,   CARDIOVASULAR: No edema noted  NEURO: Oriented x 3, gait normal,  Mood and affect appeared appropriate      Data reviewed:  The Smart card downloaded on 12/8/2022 has been reviewed independently by me for compliance and discussed the " data with the patient.   Compliance; 100%  More than 4 hr use, 100%  Average use of the device 6 hours and 33-minute per night  Residual AHI: 1.6 /hr (goal < 5.0 /hr)  Mask type: Nasal mask  Device: ResMed  DME: [employer]      ASSESSMENT AND PLAN:  · Obstructive sleep apnea ( G 47.33).  The symptoms of sleep apnea have improved with the device and the treatment.  Patient's compliance with the device is excellent for treatment of sleep apnea.  I have independently reviewed the smart card down load and discussed with the patient the download data and encouarged the patient to continue to use the device.The residual AHI is acceptable. The device is benefiting the patient and the device is medically necessary.  Without proper control of sleep apnea and good compliance there is a increased risk for hypertension, diabetes mellitus and nonrestorative sleep with hypersomnia which can increase risk for motor vehicle accidents.  Untreated sleep apnea is also a risk factor for development of atrial fibrillation, pulmonary hypertension and stroke. The patient is also instructed to get the supplies from the DME [employer] and and change them on a regular basis.  A prescription for supplies has been sent to the DME [employer].  I have also discussed the good sleep hygiene habits and adequate amount of sleep needed for good health.  · Obesity  1 with BMI is Body mass index is 32.33 kg/m².. I have discuss the relationship between the weight and sleep apnea. The benefit of weight loss in reducing severity of sleep apnea was discussed. Discussed diet and exercise with the patient to achieve ideal BMI.   · Return in about 1 year (around 12/8/2023) for with smart card down load. . Patient's questions were answered.      Kameron Nolasco MD  Sleep Medicine.  Medical Director, [facility] sleep [facility]  12/8/2022 ,

## 2022-12-12 ENCOUNTER — PATIENT OUTREACH (OUTPATIENT)
Dept: CASE MANAGEMENT | Facility: OTHER | Age: 57
End: 2022-12-12

## 2022-12-12 RX ORDER — MONTELUKAST SODIUM 10 MG/1
10 TABLET ORAL DAILY
Qty: 90 TABLET | Refills: 3 | Status: SHIPPED | OUTPATIENT
Start: 2022-12-12

## 2022-12-12 NOTE — OUTREACH NOTE
AMBULATORY CASE MANAGEMENT NOTE    Name and Relationship of Patient/Support Person: Ramirez Pena - Self    Patient Outreach    Spoke to patient. Pt has good family support. Introduced self, explained ECM RN role and provided contact information to pt's work email per pt consent. Education provided for CM  Program, [place] benefit and [place] benefit (pt had questions regarding this program). Pt stated he needs to make appt with his PCP, plans to do so this week. Pt states he is doing well. Notices some occasional sweelling of one foot. Plans to discuss with provider. Also states irregularity with his bowels. Can go 3 days with no BM, then on the 4th day, may go several times in an 8 hour period. Reviewed side effects of Ozempic and encouraged pt to discuss with provider as well as other plans for constipation such as Miralax/Metamucil. Pt states he gets lots of fiber in his diet. Pt reports good control with blood sugars.  Pt verbalized understanding.    Pt reports UTD on vaccines.  Pt declined needs or concerns at this time. Pt thanks RN-ECM for calling. Advised pt to call RN-ECM or [facility] nurse line with any needs. Follow up outreach as needed.    NANCY PACHECO  Ambulatory Case Management    12/12/2022, 16:53 EST

## 2022-12-30 ENCOUNTER — OFFICE VISIT (OUTPATIENT)
Dept: FAMILY MEDICINE CLINIC | Facility: CLINIC | Age: 57
End: 2022-12-30
Payer: COMMERCIAL

## 2022-12-30 VITALS
HEART RATE: 69 BPM | WEIGHT: 218.2 LBS | HEIGHT: 69 IN | BODY MASS INDEX: 32.32 KG/M2 | SYSTOLIC BLOOD PRESSURE: 126 MMHG | DIASTOLIC BLOOD PRESSURE: 76 MMHG | RESPIRATION RATE: 16 BRPM | OXYGEN SATURATION: 99 % | TEMPERATURE: 97.1 F

## 2022-12-30 DIAGNOSIS — Z00.00 ANNUAL PHYSICAL EXAM: Primary | ICD-10-CM

## 2022-12-30 DIAGNOSIS — Z13.29 SCREENING FOR THYROID DISORDER: ICD-10-CM

## 2022-12-30 DIAGNOSIS — Z12.5 SCREENING FOR PROSTATE CANCER: ICD-10-CM

## 2022-12-30 DIAGNOSIS — E11.9 TYPE 2 DIABETES MELLITUS WITHOUT COMPLICATION, WITHOUT LONG-TERM CURRENT USE OF INSULIN: ICD-10-CM

## 2022-12-30 DIAGNOSIS — Z79.899 MEDICATION MANAGEMENT: ICD-10-CM

## 2022-12-30 DIAGNOSIS — R73.09 ELEVATED GLUCOSE: ICD-10-CM

## 2022-12-30 DIAGNOSIS — E66.1 CLASS 1 DRUG-INDUCED OBESITY WITH BODY MASS INDEX (BMI) OF 32.0 TO 32.9 IN ADULT, UNSPECIFIED WHETHER SERIOUS COMORBIDITY PRESENT: ICD-10-CM

## 2022-12-30 PROCEDURE — 99396 PREV VISIT EST AGE 40-64: CPT | Performed by: NURSE PRACTITIONER

## 2022-12-30 RX ORDER — SEMAGLUTIDE 1.34 MG/ML
0.5 INJECTION, SOLUTION SUBCUTANEOUS WEEKLY
Qty: 4.5 ML | Refills: 1 | Status: SHIPPED | OUTPATIENT
Start: 2022-12-30 | End: 2023-04-03 | Stop reason: SDUPTHER

## 2022-12-30 NOTE — PROGRESS NOTES
Chief Complaint  Annual Exam (Physical exam, may want some lab work done)    Subjective        Ramirez Pena presents to [facility]  History of Present Illness    Ramirez Pena is a 57-year-old male who presents for an annual physical exam.    The patient has been fasting since 8 PM on 12/29/2022, but he has consumed liquids.    The patient has begun utilizing Ozempic. He states that his A1C level has not been evaluated recently, and he needs a new prescription as well. The patient denies side effects. He states that he bruises at the injection site. He states that he intermittently experiences a \"weird feeling,\" but he does not know what causes this. The patient utilizes Ozempic 0.25 mg/dose. The patient has not lost weight since beginning the medication. He did not administer his injection on 12/25/2022 as scheduled.    The patient states that he had been experiencing past symptoms such as constipation up until this week. He began utilizing Preparation H, and he states that he has improved since then.    The patient states that his medication has caused him to feel grumpiness and moodiness. He has been monitoring this symptom.     The patient states that he experienced soreness on 1 side his right side and then he experienced epistaxis. He thought this was caused by dry weather. He then began experiencing epistaxis on the other side. He has discontinued his usage of nasal sprays temporarily. He states that he will tell his allergist this as well. The patient also experienced pain on the right side of his neck. He states that \"when I pushed in a spot it moved.\" He states that he utilizes throat lozenges for 1 day, and this improved. He states that he could feel a release. He denies currently pain with palpation.      The patient had a hernia surgery in 2021. He was seen by gastroenterology at that time, but followed up with primary care. He began taking Prilosec at that time, and he  was told he was beginning to have acid reflux. He currently takes this, and he does notice if he does not take this medication.     The patient will have a repeat colonoscopy 5 years after his most recent colonoscopy. He had polyps. The patient has a family history.    Objective   Vital Signs:  /76 (BP Location: Right arm, Patient Position: Sitting, Cuff Size: Adult)   Pulse 69   Temp 97.1 °F (36.2 °C) (Temporal)   Resp 16   Ht 175.3 cm (69.02\")   Wt 99 kg (218 lb 3.2 oz)   SpO2 99%   BMI 32.21 kg/m²   Estimated body mass index is 32.21 kg/m² as calculated from the following:    Height as of this encounter: 175.3 cm (69.02\").    Weight as of this encounter: 99 kg (218 lb 3.2 oz).          Physical Exam  Vitals reviewed.   Constitutional:       General: He is not in acute distress.     Appearance: He is well-developed. He is not diaphoretic.   HENT:      Head: Normocephalic and atraumatic.      Right Ear: Ear canal and external ear normal. A middle ear effusion is present. Tympanic membrane is not erythematous.      Left Ear: Tympanic membrane, ear canal and external ear normal.      Nose: Nose normal.      Mouth/Throat:      Mouth: Mucous membranes are moist.      Pharynx: Oropharynx is clear. Uvula midline. No oropharyngeal exudate.   Eyes:      Conjunctiva/sclera: Conjunctivae normal.      Pupils: Pupils are equal, round, and reactive to light.   Cardiovascular:      Rate and Rhythm: Normal rate and regular rhythm.      Heart sounds: Normal heart sounds. No murmur heard.    No friction rub. No gallop.   Pulmonary:      Effort: Pulmonary effort is normal. No respiratory distress.      Breath sounds: Normal breath sounds. No wheezing or rales.   Abdominal:      General: Bowel sounds are normal. There is no distension.      Palpations: Abdomen is soft.      Tenderness: There is no abdominal tenderness.   Musculoskeletal:      Cervical back: Neck supple.   Lymphadenopathy:      Cervical: No cervical  adenopathy.   Skin:     General: Skin is warm and dry.   Neurological:      Mental Status: He is alert and oriented to person, place, and time.   Psychiatric:         Mood and Affect: Mood normal.          Result Review :                  Assessment and Plan   Diagnoses and all orders for this visit:    1. Annual physical exam (Primary)  -     CBC & Differential  -     Comprehensive Metabolic Panel  -     Lipid Panel With LDL / HDL Ratio    2. Type 2 diabetes mellitus without complication, without long-term current use of insulin (HCC)  -     Hemoglobin A1c  -     Microalbumin / Creatinine Urine Ratio - Urine, Clean Catch    3. Screening for thyroid disorder  -     TSH Rfx On Abnormal To Free T4    4. Screening for prostate cancer  -     PSA Screen    5. Medication management  -     Lipid Panel With LDL / HDL Ratio    6. Elevated glucose  -     Semaglutide,0.25 or 0.5MG/DOS, (Ozempic, 0.25 or 0.5 MG/DOSE,) 2 MG/1.5ML solution pen-injector; Inject 0.5 mg under the skin into the appropriate area as directed 1 (One) Time Per Week.  Dispense: 4.5 mL; Refill: 1    7. 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  Dispense: 4.5 mL; Refill: 1      1. Annual physical exam  - The patient's blood pressure is normal.    2. Type 2 diabetes mellitus  - The patient was prescribed Ozempic 0.5 mg/dose. He will inject 0.25 mg once more, and then he will change his dosage. He has tolerated the 0.25 mg dosage well. He has had some constipation, but he is taking over-the-counter medication and reports improvement.     Patient is going to do a full panel of labs today. He follows-up with neurology. They are prescribing his Plavix and his atorvastatin. He is also  up-to-date on his eye exams. He will follow-up with the allergist. He is doing allergy injections this afternoon and then follow-up in 6 months with me.       Follow Up   No follow-ups on file.  Patient was given instructions and counseling regarding his condition or for health maintenance advice. Please see specific information pulled into the AVS if appropriate.       Transcribed from ambient dictation for IVETTE Welch by Karely Kumar.  12/30/22   16:25 EST    Patient or patient representative verbalized consent to the visit recording.  I have personally performed the services described in this document as transcribed by the above individual, and it is both accurate and complete.

## 2022-12-31 LAB
ALBUMIN SERPL-MCNC: 4.7 G/DL (ref 3.5–5.2)
ALBUMIN/CREAT UR: <12 MG/G CREAT (ref 0–29)
ALBUMIN/GLOB SERPL: 2.4 G/DL
ALP SERPL-CCNC: 63 U/L (ref 39–117)
ALT SERPL-CCNC: 32 U/L (ref 1–41)
AST SERPL-CCNC: 24 U/L (ref 1–40)
BASOPHILS # BLD AUTO: 0.04 10*3/MM3 (ref 0–0.2)
BASOPHILS NFR BLD AUTO: 0.6 % (ref 0–1.5)
BILIRUB SERPL-MCNC: 0.7 MG/DL (ref 0–1.2)
BUN SERPL-MCNC: 7 MG/DL (ref 6–20)
BUN/CREAT SERPL: 8.1 (ref 7–25)
CALCIUM SERPL-MCNC: 9.2 MG/DL (ref 8.6–10.5)
CHLORIDE SERPL-SCNC: 98 MMOL/L (ref 98–107)
CHOLEST SERPL-MCNC: 109 MG/DL (ref 0–200)
CO2 SERPL-SCNC: 27.4 MMOL/L (ref 22–29)
CREAT SERPL-MCNC: 0.86 MG/DL (ref 0.76–1.27)
CREAT UR-MCNC: 26 MG/DL
EGFRCR SERPLBLD CKD-EPI 2021: 101 ML/MIN/1.73
EOSINOPHIL # BLD AUTO: 0.13 10*3/MM3 (ref 0–0.4)
EOSINOPHIL NFR BLD AUTO: 2 % (ref 0.3–6.2)
ERYTHROCYTE [DISTWIDTH] IN BLOOD BY AUTOMATED COUNT: 12.3 % (ref 12.3–15.4)
GLOBULIN SER CALC-MCNC: 2 GM/DL
GLUCOSE SERPL-MCNC: 90 MG/DL (ref 65–99)
HBA1C MFR BLD: 5.6 % (ref 4.8–5.6)
HCT VFR BLD AUTO: 42.4 % (ref 37.5–51)
HDLC SERPL-MCNC: 36 MG/DL (ref 40–60)
HGB BLD-MCNC: 14.7 G/DL (ref 13–17.7)
IMM GRANULOCYTES # BLD AUTO: 0.03 10*3/MM3 (ref 0–0.05)
IMM GRANULOCYTES NFR BLD AUTO: 0.5 % (ref 0–0.5)
LDLC SERPL CALC-MCNC: 56 MG/DL (ref 0–100)
LDLC/HDLC SERPL: 1.53 {RATIO}
LYMPHOCYTES # BLD AUTO: 2.13 10*3/MM3 (ref 0.7–3.1)
LYMPHOCYTES NFR BLD AUTO: 33.2 % (ref 19.6–45.3)
MCH RBC QN AUTO: 31.7 PG (ref 26.6–33)
MCHC RBC AUTO-ENTMCNC: 34.7 G/DL (ref 31.5–35.7)
MCV RBC AUTO: 91.6 FL (ref 79–97)
MICROALBUMIN UR-MCNC: <3 UG/ML
MONOCYTES # BLD AUTO: 0.6 10*3/MM3 (ref 0.1–0.9)
MONOCYTES NFR BLD AUTO: 9.3 % (ref 5–12)
NEUTROPHILS # BLD AUTO: 3.49 10*3/MM3 (ref 1.7–7)
NEUTROPHILS NFR BLD AUTO: 54.4 % (ref 42.7–76)
NRBC BLD AUTO-RTO: 0 /100 WBC (ref 0–0.2)
PLATELET # BLD AUTO: 179 10*3/MM3 (ref 140–450)
POTASSIUM SERPL-SCNC: 4 MMOL/L (ref 3.5–5.2)
PROT SERPL-MCNC: 6.7 G/DL (ref 6–8.5)
PSA SERPL-MCNC: 1.26 NG/ML (ref 0–4)
RBC # BLD AUTO: 4.63 10*6/MM3 (ref 4.14–5.8)
SODIUM SERPL-SCNC: 136 MMOL/L (ref 136–145)
TRIGL SERPL-MCNC: 89 MG/DL (ref 0–150)
TSH SERPL DL<=0.005 MIU/L-ACNC: 1.47 UIU/ML (ref 0.27–4.2)
VLDLC SERPL CALC-MCNC: 17 MG/DL (ref 5–40)
WBC # BLD AUTO: 6.42 10*3/MM3 (ref 3.4–10.8)

## 2023-02-08 RX ORDER — CLOPIDOGREL BISULFATE 75 MG/1
75 TABLET ORAL DAILY
Qty: 90 TABLET | Refills: 3 | Status: SHIPPED | OUTPATIENT
Start: 2023-02-08

## 2023-02-08 RX ORDER — PANTOPRAZOLE SODIUM 20 MG/1
20 TABLET, DELAYED RELEASE ORAL DAILY
Qty: 90 TABLET | Refills: 1 | Status: SHIPPED | OUTPATIENT
Start: 2023-02-08 | End: 2024-02-08

## 2023-03-02 RX ORDER — AZELASTINE 1 MG/ML
2 SPRAY, METERED NASAL 2 TIMES DAILY
Qty: 90 ML | Refills: 3 | Status: SHIPPED | OUTPATIENT
Start: 2023-03-02

## 2023-03-02 NOTE — TELEPHONE ENCOUNTER
Rx Refill Note  Requested Prescriptions     Pending Prescriptions Disp Refills   • azelastine (ASTELIN) 0.1 % nasal spray 90 mL 3     Sig: Use 2 sprays into each nostril twice daily      Last office visit with prescribing clinician: 12/30/2022   Last telemedicine visit with prescribing clinician: Visit date not found   Next office visit with prescribing clinician: Visit date not found       {TIP  Please add Last Relevant Lab Date if appropriate: 12/30/22                 Would you like a call back once the refill request has been completed: [] Yes [] No    If the office needs to give you a call back, can they leave a voicemail: [] Yes [] No    Meghan Myrick MA  03/02/23, 07:01 EST

## 2023-03-13 RX ORDER — ATORVASTATIN CALCIUM 80 MG/1
80 TABLET, FILM COATED ORAL NIGHTLY
Qty: 30 TABLET | Refills: 3 | Status: SHIPPED | OUTPATIENT
Start: 2023-03-13

## 2023-06-07 ENCOUNTER — TELEPHONE (OUTPATIENT)
Dept: NEUROLOGY | Facility: CLINIC | Age: 58
End: 2023-06-07
Payer: COMMERCIAL

## 2023-06-07 NOTE — TELEPHONE ENCOUNTER
I was waiting on the authorization for that test. Test has been approved, patient can call scheduling [phone] to get test scheduled.

## 2023-06-07 NOTE — TELEPHONE ENCOUNTER
PT CALLING REGARDING REFERRAL FOR     DUPLEX CAROTID BILATERAL CAR     PT WOULD LIKE TO COMPLETE THIS PRIOR TO NEXT OV SCHEDULED FOR 7/6/23     REFERRAL IS IN CHART BUT NOT READY TO SCHEDULE.      PLEASE ADVISE PT IF THIS NEEDS TO BE COMPLETED PRIOR TO NEXT OV AND IF SO SEND REFERRAL.    THANK YOU

## 2023-08-01 RX ORDER — PANTOPRAZOLE SODIUM 20 MG/1
20 TABLET, DELAYED RELEASE ORAL DAILY
Qty: 90 TABLET | Refills: 3 | Status: SHIPPED | OUTPATIENT
Start: 2023-08-01 | End: 2024-07-31

## 2023-10-06 ENCOUNTER — OFFICE VISIT (OUTPATIENT)
Dept: FAMILY MEDICINE CLINIC | Facility: CLINIC | Age: 58
End: 2023-10-06
Payer: COMMERCIAL

## 2023-10-06 VITALS
OXYGEN SATURATION: 98 % | BODY MASS INDEX: 32.29 KG/M2 | WEIGHT: 218 LBS | TEMPERATURE: 96.9 F | HEART RATE: 55 BPM | SYSTOLIC BLOOD PRESSURE: 120 MMHG | RESPIRATION RATE: 18 BRPM | HEIGHT: 69 IN | DIASTOLIC BLOOD PRESSURE: 72 MMHG

## 2023-10-06 DIAGNOSIS — M79.671 BILATERAL FOOT PAIN: Primary | ICD-10-CM

## 2023-10-06 DIAGNOSIS — E11.9 TYPE 2 DIABETES MELLITUS WITHOUT COMPLICATION, WITHOUT LONG-TERM CURRENT USE OF INSULIN: ICD-10-CM

## 2023-10-06 DIAGNOSIS — M79.672 BILATERAL FOOT PAIN: Primary | ICD-10-CM

## 2023-10-06 DIAGNOSIS — N50.89 TESTICULAR MASS: ICD-10-CM

## 2023-10-06 RX ORDER — SEMAGLUTIDE 0.68 MG/ML
0.5 INJECTION, SOLUTION SUBCUTANEOUS WEEKLY
Qty: 9 ML | Refills: 1 | Status: SHIPPED | OUTPATIENT
Start: 2023-10-06

## 2023-10-06 NOTE — PROGRESS NOTES
"Chief Complaint  Pain (Balls of feet, felt like patient was stepping on tacks middle of feet,  2 weeks. ) and Testicle Pain (Lump, Middle month.  Left.)    Subjective        Ramirez Pena presents to [facility]  History of Present Illness    Ramirez Pena is a 58-year-old male who presents today for testicular lump and foot pain.    The patient is doing well. He reports pain in the middle of the plantar aspect of his bilateral feet that started approximately 2 weeks ago. The pain is not severe today; however, it was bad enough that he was limping. He states it felt like \"someone was trying to drive a stake\" through his foot. The pain only seems to occur when he is walking. He denies the pain radiating into his heel.  He denies any injuries to his feet. He notes his one foot has always been swollen since 2020. He denies any bruises. He advises his foot feels okay right now; however, it is a little tingly. He denies it being a shooting tingly sensation. He denies it being neuropathy. He denies ever having plantar fasciitis. He denies pain in his heel or arch. He tried changing shoes, but the pain occurred regardless of the shoes he wore. He wears [employer] shoes and other shoes with good support.     He discovered a mass on his scrotum at the beginning of 10/2023. He denies pain. It is just a mass on one end of the testicle. He describes it as being on the inside of the scrotum and attached to the testicle. At first, he thought it was possibly a small blood clot that needed to work itself out. He increased his water intake. He denies the mass changing, not getting smaller or growing.     He is currently taking Ozempic 0.5 mg. He reports that after taking an injection, he will go 3 to 4 days with no bowel movement. He denies taking anything for it. When he is finally able to have a bowel movement, he feels a lot better; however, it is usually time to take another injection at this " "point and then it all starts again. He is taking the Ozempic injection at night. He inquires if he should continue taking the Ozempic. His last hemoglobin A1c was 5.6 percent, however, he is not losing weight. He queries if he should monitor his blood glucose. He wonders if the Ozempic could cause his other issues.      Objective   Vital Signs:  /72 (BP Location: Right arm, Patient Position: Sitting, Cuff Size: Adult)   Pulse 55   Temp 96.9 øF (36.1 øC) (Temporal)   Resp 18   Ht 175.3 cm (69.02\")   Wt 98.9 kg (218 lb)   SpO2 98%   BMI 32.18 kg/mý   Estimated body mass index is 32.18 kg/mý as calculated from the following:    Height as of this encounter: 175.3 cm (69.02\").    Weight as of this encounter: 98.9 kg (218 lb).            Physical Exam  Constitutional:       General: He is not in acute distress.     Appearance: He is well-developed. He is not diaphoretic.   Cardiovascular:      Rate and Rhythm: Normal rate and regular rhythm.      Heart sounds: Normal heart sounds. No murmur heard.     No friction rub. No gallop.   Pulmonary:      Effort: Pulmonary effort is normal. No respiratory distress.      Breath sounds: Normal breath sounds. No wheezing or rales.   Abdominal:      General: Bowel sounds are normal. There is no distension.      Palpations: Abdomen is soft.      Tenderness: There is no abdominal tenderness.   Genitourinary:     Comments: Testicular mass, left    Musculoskeletal:      Cervical back: Neck supple.   Feet:      Right foot:      Skin integrity: Skin integrity normal.      Left foot:      Skin integrity: Skin integrity normal.   Skin:     General: Skin is warm and dry.   Neurological:      Mental Status: He is alert and oriented to person, place, and time.          Result Review :  Lab work completed on 12/30/2022 showed his hemoglobin A1c was normal at 5.60 percent.                  Assessment and Plan   Diagnoses and all orders for this visit:    1. Bilateral foot pain " (Primary)    2. Testicular mass  -     US testicular or ovarian vascular limited; Future  -     US scrotum and testicles; Future    3. Type 2 diabetes mellitus without complication, without long-term current use of insulin    Other orders  -     Semaglutide,0.25 or 0.5MG/DOS, (Ozempic, 0.25 or 0.5 MG/DOSE,) 2 MG/3ML solution pen-injector; Inject 0.5mg under the skin into the appropriate area as directed 1 (One) Time Per Week.  Dispense: 9 mL; Refill: 1        1. Bilateral foot pain   The patient will continue to monitor his symptoms. If his symptoms worsen, he will contact the office and we will send a referral to podiatry.     2. Testicular mass  An order has been placed for an ultrasound testicular vascular and an ultrasound scrotum and testicles.     3. DM  His last hemoglobin A1c was 5.6 percent, however, he is not losing weight. The patient will continue Ozempic 0.5 mg. A prescription refill for semaglutide 0.25 or 0.5 mg/dose (Ozempic 0.25 or 0.5 mg/dose) 2 mg/3 ml solution pen-injector has been sent.         Follow Up   No follow-ups on file.  Patient was given instructions and counseling regarding his condition or for health maintenance advice. Please see specific information pulled into the AVS if appropriate.       Transcribed from ambient dictation for IVETTE Welch by Shoshana Garrison.  10/06/23   17:31 EDT    Patient or patient representative verbalized consent to the visit recording.  I have personally performed the services described in this document as transcribed by the above individual, and it is both accurate and complete.

## 2023-10-06 NOTE — PROGRESS NOTES
Chief Complaint  Pain (Balls of feet, felt like patient was stepping on tacks middle of feet,  2 weeks. ) and Testicle Pain (Lump, Middle month.  Left.)    Subjective    {Problem List  Visit Diagnosis   Encounters  Notes  Medications  Labs  Result Review Imaging  Media :23}    Ramirez Pena presents to [facility]  History of Present Illness  Bilateral foot pain, in past two weeks, at base of foot, denies arch pain or heel pain    Lump in left testicle, over past month,   Pain  This is a new problem. The current episode started 1 to 4 weeks ago. The problem occurs daily. The problem has been unchanged. Associated symptoms include arthralgias (bilateral foot). Pertinent negatives include no abdominal pain, anorexia, change in bowel habit, chest pain, chills, congestion, coughing, diaphoresis, fatigue, fever, headaches, joint swelling, myalgias, nausea, neck pain, numbness, rash, sore throat, swollen glands, urinary symptoms, vertigo, visual change, vomiting or weakness. The symptoms are aggravated by walking.   Testicle Pain  The patient's primary symptoms include testicular pain. This is a new problem. The current episode started 1 to 4 weeks ago. The problem occurs daily. The problem has been unchanged. The patient is experiencing no pain. Pertinent negatives include no abdominal pain, anorexia, chest pain, chills, constipation, coughing, diarrhea, discolored urine, dysuria, fever, flank pain, frequency, headaches, hematuria, hesitancy, joint pain, joint swelling, nausea, painful intercourse, rash, shortness of breath, sore throat, urgency, urinary retention or vomiting. The testicular pain affects the left testicle. There is swelling in the left testicle. The color of the testicles is Normal.       Objective   Vital Signs:  /72 (BP Location: Right arm, Patient Position: Sitting, Cuff Size: Adult)   Pulse 55   Temp 96.9 øF (36.1 øC) (Temporal)   Resp 18   Ht 175.3  "cm (69.02\")   Wt 98.9 kg (218 lb)   SpO2 98%   BMI 32.18 kg/mý   Estimated body mass index is 32.18 kg/mý as calculated from the following:    Height as of this encounter: 175.3 cm (69.02\").    Weight as of this encounter: 98.9 kg (218 lb).       {BMI is >= 30 and <35. (Class 1 Obesity). The following options were offered after discussion; (Optional):01806}      Physical Exam   Result Review :{Labs  Result Review  Imaging  Med Tab  Media  Procedures  :23}  {The following data was reviewed by (Optional):58595}  {Ambulatory Labs (Optional):70212}  {Data reviewed (Optional):86867:::1}             Assessment and Plan {CC Problem List  Visit Diagnosis   ROS  Review (Popup)  Health Maintenance  Quality  BestPractice  Medications  SmartSets  SnapShot Encounters  Media :23}  Diagnoses and all orders for this visit:    1. Bilateral foot pain (Primary)           {Time Spent (Optional):51341}  Follow Up {Instructions Charge Capture  Follow-up Communications :23}  No follow-ups on file.  Patient was given instructions and counseling regarding his condition or for health maintenance advice. Please see specific information pulled into the AVS if appropriate.         Answers submitted by the patient for this visit:  Primary Reason for Visit (Submitted on 10/6/2023)  What is the primary reason for your visit?: Other  Other (Submitted on 10/6/2023)  Please describe your symptoms.: Have a bump on my left testical. Also the balls of my feet hurt  Have you had these symptoms before?: No  How long have you been having these symptoms?: Greater than 2 weeks    "

## 2023-10-17 ENCOUNTER — HOSPITAL ENCOUNTER (OUTPATIENT)
Dept: ULTRASOUND IMAGING | Facility: HOSPITAL | Age: 58
Discharge: HOME | End: 2023-10-17
Admitting: NURSE PRACTITIONER
Payer: COMMERCIAL

## 2023-10-17 DIAGNOSIS — N50.89 TESTICULAR MASS: ICD-10-CM

## 2023-10-17 PROCEDURE — 76870 US EXAM SCROTUM: CPT

## 2023-11-14 RX ORDER — ATORVASTATIN CALCIUM 80 MG/1
80 TABLET, FILM COATED ORAL NIGHTLY
Qty: 30 TABLET | Refills: 3 | Status: SHIPPED | OUTPATIENT
Start: 2023-11-14

## 2023-12-07 RX ORDER — MONTELUKAST SODIUM 10 MG/1
10 TABLET ORAL DAILY
Qty: 90 TABLET | Refills: 3 | Status: SHIPPED | OUTPATIENT
Start: 2023-12-07

## 2023-12-07 NOTE — TELEPHONE ENCOUNTER
Rx Refill Note  Requested Prescriptions     Pending Prescriptions Disp Refills    montelukast (SINGULAIR) 10 MG tablet 90 tablet 3     Sig: Take 1 tablet by mouth Daily.      Last office visit with prescribing clinician: 10/6/2023   Last telemedicine visit with prescribing clinician: Visit date not found   Next office visit with prescribing clinician: Visit date not found                         Would you like a call back once the refill request has been completed: [] Yes [] No    If the office needs to give you a call back, can they leave a voicemail: [] Yes [] No    Michael Hilario Rep  12/07/23, 11:58 EST

## 2023-12-14 ENCOUNTER — OFFICE VISIT (OUTPATIENT)
Dept: SLEEP MEDICINE | Facility: HOSPITAL | Age: 58
End: 2023-12-14
Payer: COMMERCIAL

## 2023-12-14 VITALS
SYSTOLIC BLOOD PRESSURE: 147 MMHG | WEIGHT: 217.2 LBS | HEART RATE: 79 BPM | OXYGEN SATURATION: 95 % | BODY MASS INDEX: 32.17 KG/M2 | DIASTOLIC BLOOD PRESSURE: 100 MMHG | HEIGHT: 69 IN

## 2023-12-14 DIAGNOSIS — G47.33 OSA ON CPAP: Primary | ICD-10-CM

## 2023-12-14 PROBLEM — E66.9 CLASS 1 OBESITY: Status: ACTIVE | Noted: 2023-12-14

## 2023-12-14 PROBLEM — E66.811 CLASS 1 OBESITY: Status: ACTIVE | Noted: 2023-12-14

## 2023-12-14 NOTE — PROGRESS NOTES
"  [facility]  [address]  [address]  [place] [address]  Phone   Fax       SLEEP CLINIC FOLLOW UP PROGRESS NOTE.    Ramirez Pena  [id number]   1965  58 y.o.  male      PCP: Carmina Awad APRN      Date of visit: 12/14/2023    Chief Complaint   Patient presents with    Sleep Apnea    Obesity       HPI:  This is a 58 y.o. years old patient is here for the management of obstructive sleep apnea.  Sleep apnea is mild in severity with a AHI of 13/hr. Patient is using positive airway pressure therapy with auto CPAP and the symptoms of sleep apnea have improved significantly on the therapy. Normally patient goes to bed at 10 PM and wakes up at 6 AM .  The patient wakes up 1-2 time(s) during the night and has no problem going back to sleep.  Feels refreshed after waking up.  He works in [employer]    Medications and allergies are reviewed by me and documented in the encounter.     SOCIAL (habits pertaining to sleep medicine)  History tobacco use:Yes   History of alcohol use: 0 per week  Caffeine use: 3     REVIEW OF SYSTEMS:   Pertaining positive symptoms are:  [place] Sleepiness Scale :Total score: 3         PHYSICAL EXAMINATION:  CONSTITUTIONAL:  Vitals:    12/14/23 1500   BP: 147/100   Pulse: 79   SpO2: 95%   Weight: 98.5 kg (217 lb 3.2 oz)   Height: 175.3 cm (69\")    Body mass index is 32.07 kg/m².   NOSE: nasal passages are clear, No deformities noted   RESP SYSTEM: Not in any respiratory distress, no chest deformities noted,   CARDIOVASULAR: No edema noted  NEURO: Oriented x 3, gait normal,  Mood and affect appeared appropriate      Data reviewed:  The Smart card downloaded on 12/14/2023 has been reviewed independently by me for compliance and discussed the data with the patient.   Compliance; 100%  More than 4 hr use, 100%  Average use of the device 6 hours and 55 minutes per night  Residual AHI: 1.5 /hr (goal < 5.0 /hr)  Mask type: Nasal " pillows  Device: ResMed  DME: [employer]      ASSESSMENT AND PLAN:  Obstructive sleep apnea ( G 47.33).  The symptoms of sleep apnea have improved with the device and the treatment.  Patient's compliance with the device is excellent for treatment of sleep apnea.  I have independently reviewed the smart card down load and discussed with the patient the download data and encouarged the patient to continue to use the device.The residual AHI is acceptable. The device is benefiting the patient and the device is medically necessary.  Without proper control of sleep apnea and good compliance there is a increased risk for hypertension, diabetes mellitus and nonrestorative sleep with hypersomnia which can increase risk for motor vehicle accidents.  Untreated sleep apnea is also a risk factor for development of atrial fibrillation, pulmonary hypertension, insulin resistance and stroke. The patient is also instructed to get the supplies from the DME company and and change them on a regular basis.  A prescription for supplies has been sent to the DME company.  I have also discussed the good sleep hygiene habits and adequate amount of sleep needed for good health.  Obesity  1 with BMI is Body mass index is 32.07 kg/m².. I have discuss the relationship between the weight and sleep apnea. The benefit of weight loss in reducing severity of sleep apnea was discussed. Discussed diet and exercise with the patient to achieve ideal BMI.   Return in about 1 year (around 12/14/2024) for with smart card down load. . Patient's questions were answered.    12/14/2023  Kameron Nolasco MD  Sleep Medicine.  Medical Director,   [facility] [place] [facility].

## 2024-01-29 RX ORDER — CLOPIDOGREL BISULFATE 75 MG/1
75 TABLET ORAL DAILY
Qty: 90 TABLET | Refills: 1 | Status: SHIPPED | OUTPATIENT
Start: 2024-01-29

## 2024-03-11 RX ORDER — ATORVASTATIN CALCIUM 80 MG/1
80 TABLET, FILM COATED ORAL NIGHTLY
Qty: 30 TABLET | Refills: 3 | Status: SHIPPED | OUTPATIENT
Start: 2024-03-11

## 2024-04-22 RX ORDER — AZELASTINE 1 MG/ML
2 SPRAY, METERED NASAL 2 TIMES DAILY
Qty: 90 ML | Refills: 3 | Status: SHIPPED | OUTPATIENT
Start: 2024-04-22

## 2024-04-22 RX ORDER — SEMAGLUTIDE 0.68 MG/ML
0.5 INJECTION, SOLUTION SUBCUTANEOUS WEEKLY
Qty: 9 ML | Refills: 1 | Status: SHIPPED | OUTPATIENT
Start: 2024-04-22

## 2024-04-22 NOTE — TELEPHONE ENCOUNTER
Rx Refill Note  Requested Prescriptions     Pending Prescriptions Disp Refills    azelastine (ASTELIN) 0.1 % nasal spray 90 mL 3     Sig: Instill 2 sprays into the nostril(s) as directed by provider 2 (Two) Times a Day.    Semaglutide,0.25 or 0.5MG/DOS, (Ozempic, 0.25 or 0.5 MG/DOSE,) 2 MG/3ML solution pen-injector 9 mL 1     Sig: Inject 0.5mg under the skin into the appropriate area as directed 1 (One) Time Per Week.      Last office visit with prescribing clinician: 10/6/2023   Last telemedicine visit with prescribing clinician: Visit date not found   Next office visit with prescribing clinician: Visit date not found                         Would you like a call back once the refill request has been completed: [] Yes [] No    If the office needs to give you a call back, can they leave a voicemail: [] Yes [] No    Can Boone MA  04/22/24, 08:06 EDT

## 2024-06-04 ENCOUNTER — TELEPHONE (OUTPATIENT)
Dept: NEUROLOGY | Facility: CLINIC | Age: 59
End: 2024-06-04
Payer: COMMERCIAL

## 2024-06-04 NOTE — TELEPHONE ENCOUNTER
Can order be placed for Duplex Carotid Ultrasound CAR. I will cancel current order placed under US Carotid Bilateral, that order can only be used if patient is having scan done in [place].

## 2024-06-05 DIAGNOSIS — I65.21 CAROTID STENOSIS, ASYMPTOMATIC, RIGHT: Primary | ICD-10-CM

## 2024-06-17 ENCOUNTER — HOSPITAL ENCOUNTER (OUTPATIENT)
Dept: CARDIOLOGY | Facility: HOSPITAL | Age: 59
Discharge: HOME | End: 2024-06-17
Admitting: NURSE PRACTITIONER
Payer: COMMERCIAL

## 2024-06-17 DIAGNOSIS — I65.21 CAROTID STENOSIS, ASYMPTOMATIC, RIGHT: ICD-10-CM

## 2024-06-17 LAB
BH CV XLRA MEAS LEFT DIST CCA EDV: -22.8 CM/SEC
BH CV XLRA MEAS LEFT DIST CCA PSV: -62.1 CM/SEC
BH CV XLRA MEAS LEFT DIST ICA EDV: -25.1 CM/SEC
BH CV XLRA MEAS LEFT DIST ICA PSV: -64.8 CM/SEC
BH CV XLRA MEAS LEFT ICA/CCA RATIO: 1.13
BH CV XLRA MEAS LEFT MID ICA EDV: -28.7 CM/SEC
BH CV XLRA MEAS LEFT MID ICA PSV: -70.3 CM/SEC
BH CV XLRA MEAS LEFT PROX CCA EDV: 20.8 CM/SEC
BH CV XLRA MEAS LEFT PROX CCA PSV: 82.5 CM/SEC
BH CV XLRA MEAS LEFT PROX ECA EDV: -21.7 CM/SEC
BH CV XLRA MEAS LEFT PROX ECA PSV: -77.4 CM/SEC
BH CV XLRA MEAS LEFT PROX ICA EDV: -20.4 CM/SEC
BH CV XLRA MEAS LEFT PROX ICA PSV: -59.3 CM/SEC
BH CV XLRA MEAS LEFT PROX SCLA PSV: 128 CM/SEC
BH CV XLRA MEAS LEFT VERTEBRAL A EDV: 19.6 CM/SEC
BH CV XLRA MEAS LEFT VERTEBRAL A PSV: 58.1 CM/SEC
BH CV XLRA MEAS RIGHT DIST CCA EDV: -19.3 CM/SEC
BH CV XLRA MEAS RIGHT DIST CCA PSV: -71.5 CM/SEC
BH CV XLRA MEAS RIGHT DIST ICA EDV: -27.6 CM/SEC
BH CV XLRA MEAS RIGHT DIST ICA PSV: -71.5 CM/SEC
BH CV XLRA MEAS RIGHT ICA/CCA RATIO: 1.55
BH CV XLRA MEAS RIGHT MID ICA EDV: -35.8 CM/SEC
BH CV XLRA MEAS RIGHT MID ICA PSV: -95.6 CM/SEC
BH CV XLRA MEAS RIGHT PROX CCA EDV: -22.9 CM/SEC
BH CV XLRA MEAS RIGHT PROX CCA PSV: -84.4 CM/SEC
BH CV XLRA MEAS RIGHT PROX ECA EDV: -23.5 CM/SEC
BH CV XLRA MEAS RIGHT PROX ECA PSV: -89.1 CM/SEC
BH CV XLRA MEAS RIGHT PROX ICA EDV: -35.2 CM/SEC
BH CV XLRA MEAS RIGHT PROX ICA PSV: -111 CM/SEC
BH CV XLRA MEAS RIGHT PROX SCLA PSV: 88.5 CM/SEC
BH CV XLRA MEAS RIGHT VERTEBRAL A EDV: 10.5 CM/SEC
BH CV XLRA MEAS RIGHT VERTEBRAL A PSV: 43.6 CM/SEC
LEFT ARM BP: NORMAL MMHG
RIGHT ARM BP: NORMAL MMHG

## 2024-06-17 PROCEDURE — 93880 EXTRACRANIAL BILAT STUDY: CPT | Performed by: SURGERY

## 2024-06-17 PROCEDURE — 93880 EXTRACRANIAL BILAT STUDY: CPT

## 2024-07-01 NOTE — PROGRESS NOTES
CC: Follow-up TIA, carotid disease    HPI:  Ramirez Pena is a  59 y.o.  right-handed male with hyperlipidemia, borderline diabetes, and ROBYN on CPAP who is being seen in follow-up today for TIA and carotid stenosis.    In March 2020 an episode of transient monocular vision loss in the right eye lasting about 10 minutes.  CTA head/neck showed mild left vertebral artery narrowing, 50% stenosis of the right ICA secondary to calcified noncalcified plaque.  2D echo was unremarkable.  He was diagnosed with diabetes due to hemoglobin A1c of 7.1% and hyperlipidemia with LDL of 127.    Had an annual carotid ultrasound in June 2024 which showed less than 50% stenosis bilaterally. Reports when he had his CUS his BP and HR were elevated with BPs of 153/100 and 150/101.    No TIA or stroke in the last year.  Continues to take Plavix 75 mg daily and Lipitor 80 mg daily.  He is on Ozempic.  Last labs were in December 2022 when hemoglobin A1c was 5.6% and LDL was 56.  He tries to walk daily, tries to get at least 8000 steps per day.  Compliant with CPAP.    Past Medical History:   Diagnosis Date    Allergic 8/2018    Amaurosis fugax of right eye 03/18/2020    Carotid stenosis 03/19/2020    Class 1 obesity due to excess calories without serious comorbidity with body mass index (BMI) of 31.0 to 31.9 in adult 12/10/2020    COVID 01/2022    Depression     Elevated cholesterol     Environmental allergies     Fissure, anal 1998    GERD (gastroesophageal reflux disease)     History of TIAs 2020    Hypertension     NO MEDS    Inguinal hernia     Low back pain 10/1986    ROBYN on CPAP     AHI 12.5    PONV (postoperative nausea and vomiting)     Rectal bleeding 1998    Seasonal allergies     Stroke     Type 2 diabetes mellitus 03/19/2020         Past Surgical History:   Procedure Laterality Date    APPENDECTOMY Bilateral 2005    COLONOSCOPY N/A 9/28/2021    Procedure: COLONOSCOPY FOR SCREENING;  Surgeon: Nato Vu MD;  Location: [facility];  Service: Gastroenterology;  Laterality: N/A;  hemorrhoids , diverticulosis and polyps    CYST REMOVAL      ON HEAD    ENDOSCOPY N/A 9/28/2021    Procedure: ESOPHAGOGASTRODUODENOSCOPY;  Surgeon: Nato Vu MD;  Location: [facility];  Service: Gastroenterology;  Laterality: N/A;  Esophagitis and Gastritis    INGUINAL HERNIA REPAIR Left 12/7/2021    Procedure: left INGUINAL HERNIA REPAIR LAPAROSCOPIC WITH DAVINCI ROBOT;  Surgeon: Luis Fernando Son MD;  Location: [facility] MAIN OR;  Service: Robotics - DaVinci;  Laterality: Left;    KNEE ACL RECONSTRUCTION Right 2011           Current Outpatient Medications:     atorvastatin (LIPITOR) 80 MG tablet, Take 1 tablet by mouth Every Night., Disp: 90 tablet, Rfl: 3    azelastine (ASTELIN) 0.1 % nasal spray, Instill 2 sprays into the nostril(s) as directed by provider 2 (Two) Times a Day., Disp: 90 mL, Rfl: 3    clopidogrel (PLAVIX) 75 MG tablet, Take 1 tablet by mouth Daily., Disp: 90 tablet, Rfl: 1    EPINEPHrine (EPIPEN) 0.3 MG/0.3ML solution auto-injector injection, Use as directed., Disp: 2 each, Rfl: 3    fluticasone (FLONASE) 50 MCG/ACT nasal spray, 2 sprays into the nostril(s) as directed by provider Daily., Disp: , Rfl:     montelukast (SINGULAIR) 10 MG tablet, Take 1 tablet by mouth Daily., Disp: 90 tablet, Rfl: 3    multivitamin with minerals tablet tablet, Take 1 tablet by mouth Daily., Disp: , Rfl:     Naproxen Sodium (ALEVE PO), Take 2 capsules by mouth As Needed., Disp: , Rfl:     pantoprazole (Protonix) 20 MG EC tablet, Take 1 tablet by mouth Daily., Disp: 90 tablet, Rfl: 3    Semaglutide,0.25 or 0.5MG/DOS, (Ozempic, 0.25 or 0.5 MG/DOSE,) 2 MG/3ML solution pen-injector, Inject 0.5mg under the skin into the appropriate area as directed 1 (One) Time Per Week., Disp: 9 mL, Rfl: 1    triamcinolone (KENALOG) 0.1 % cream, Apply topically to the affected areas of upper extremities for 2 (Two) Times a Day for 2-3 weeks as needed  for flares., Disp:  "80 g, Rfl: 5      Family History   Problem Relation Age of Onset    Cancer Mother         Bladder Cancer    Heart disease Mother     Hypertension Mother     Esophageal cancer Father     Cancer Father         Esophagus Cancer    Uterine cancer Paternal Grandmother     Cancer Paternal Grandmother         Uterus Cancer    Colon cancer Paternal Grandfather     Prostate cancer Paternal Grandfather     Cancer Paternal Grandfather         Prostate Cancer    Stomach cancer Maternal Grandfather         Not sure if it was stomach cancer     Stroke Maternal Grandmother     Colon polyps Neg Hx     Crohn's disease Neg Hx     Irritable bowel syndrome Neg Hx     Ulcerative colitis Neg Hx     Malig Hyperthermia Neg Hx          Social History     Socioeconomic History    Marital status:    Tobacco Use    Smoking status: Light Smoker     Types: Cigars    Smokeless tobacco: Never    Tobacco comments:     once a week   Vaping Use    Vaping status: Never Used   Substance and Sexual Activity    Alcohol use: Yes     Comment: occasional not every day or week.    Drug use: No    Sexual activity: Yes     Partners: Male     Birth control/protection: Condom, Same-sex partner         No Known Allergies        Physical Exam:  Vitals:    07/09/24 1136   BP: 120/80   Pulse: 70   SpO2: 98%   Weight: 98.7 kg (217 lb 9.6 oz)   Height: 175.3 cm (69\")     Orthostatic BP:    Body mass index is 32.13 kg/m².    General appearance: Well developed, well nourished, well groomed, alert and cooperative.   HEENT: Normocephalic.   Cardiac: Regular rate and rhythm. No murmurs.   Chest Exam: Clear to auscultation bilaterally, no wheezes, no rhonchi.  Extremities: Normal, no edema.   Skin: No rashes or birthmarks.     Higher integrative function: Oriented to time, place, person, intact recent and remote memory, attention span, concentration and language. Spontaneous speech, fund of vocabulary are normal.   CN II: Normal visual fields.   CN III IV VI: " "Extraocular movements are full without nystagmus. Pupils are equal, round, and reactive to light.   CN V: Normal facial sensation and strength of muscles of mastication.   CN VII: Facial movements are symmetric, no weakness.   CN VIII: Auditory acuity is normal.   CN IX & X: Symmetric palatal movement.   CN XI: Sternocleidomastoid and trapezius are normal. No weakness.   CN XII: The tongue is midline. No atrophy or fasciculations.   Motor: Normal muscle strength, bulk, and tone in upper and lower extremities. No fasciculations, rigidity, spasticity or abnormal movements.   Sensation: Normal light touch in extremities.    Station and gait: Normal gait and station.   Coordination: Finger to nose test showed no dysmetria.   Results:      Lab Results   Component Value Date    GLUCOSE 90 12/30/2022    BUN 7 12/30/2022    CREATININE 0.86 12/30/2022    EGFRIFNONA 109 12/03/2021    EGFRIFAFRI >150 10/06/2021    BCR 8.1 12/30/2022    CO2 27.4 12/30/2022    CALCIUM 9.2 12/30/2022    PROTENTOTREF 6.7 12/30/2022    ALBUMIN 4.7 12/30/2022    LABIL2 2.4 12/30/2022    AST 24 12/30/2022    ALT 32 12/30/2022       Lab Results   Component Value Date    WBC 6.42 12/30/2022    HGB 14.7 12/30/2022    HCT 42.4 12/30/2022    MCV 91.6 12/30/2022     12/30/2022         .  Lab Results   Component Value Date    RPR Comment 04/02/2021         Lab Results   Component Value Date    TSH 1.470 12/30/2022    W4ISVJJ 8.2 03/21/2017         No results found for: \"EUYPELDZ69\"      No results found for: \"FOLATE\"      Lab Results   Component Value Date    HGBA1C 5.60 12/30/2022         Lab Results   Component Value Date    GLUCOSE 90 12/30/2022    BUN 7 12/30/2022    CREATININE 0.86 12/30/2022    EGFRIFNONA 109 12/03/2021    EGFRIFAFRI >150 10/06/2021    BCR 8.1 12/30/2022    K 4.0 12/30/2022    CO2 27.4 12/30/2022    CALCIUM 9.2 12/30/2022    PROTENTOTREF 6.7 12/30/2022    ALBUMIN 4.7 12/30/2022    LABIL2 2.4 12/30/2022    AST 24 12/30/2022    " ALT 32 12/30/2022         Lab Results   Component Value Date    WBC 6.42 12/30/2022    HGB 14.7 12/30/2022    HCT 42.4 12/30/2022    MCV 91.6 12/30/2022     12/30/2022             Assessment/Plan:       Diagnoses and all orders for this visit:    1. History of TIA (transient ischemic attack) (Primary)    2. Carotid stenosis, asymptomatic, right  -     Comprehensive Metabolic Panel; Future  -     Lipid Panel; Future  -     Hemoglobin A1c; Future  -     CBC & Differential; Future    3. Mixed hyperlipidemia  -     Comprehensive Metabolic Panel; Future  -     Lipid Panel; Future  -     Hemoglobin A1c; Future  -     CBC & Differential; Future    4. Type 2 diabetes mellitus with hypoglycemia without coma, without long-term current use of insulin  -     Comprehensive Metabolic Panel; Future  -     Lipid Panel; Future  -     Hemoglobin A1c; Future  -     CBC & Differential; Future    Other orders  -     atorvastatin (LIPITOR) 80 MG tablet; Take 1 tablet by mouth Every Night.  Dispense: 90 tablet; Refill: 3      Ramirez was seen today for h/o TIA/Amaurosis. CUS stable 6/2024 with <50% stenosis bilaterally   Continue plavix 75 mg daily   Blood pressure control to <130/80, recommend checking BP at home    Goal LDL <70-recommend high dose statins-Refilled lipitor 80 mg daily today    Serum glucose < 140   Call 911 for stroke any stroke symptoms   Follow-up in 1 year with repeat CUS    Checking CMP, CBC, A1c, fasting lipid panel. Messaged his PCP to see if she wanted any additional labs        Total time:>30 min            Dictated utilizing Dragon dictation.

## 2024-07-09 ENCOUNTER — OFFICE VISIT (OUTPATIENT)
Dept: NEUROLOGY | Facility: CLINIC | Age: 59
End: 2024-07-09
Payer: COMMERCIAL

## 2024-07-09 VITALS
HEIGHT: 69 IN | BODY MASS INDEX: 32.23 KG/M2 | DIASTOLIC BLOOD PRESSURE: 80 MMHG | HEART RATE: 70 BPM | SYSTOLIC BLOOD PRESSURE: 120 MMHG | OXYGEN SATURATION: 98 % | WEIGHT: 217.6 LBS

## 2024-07-09 DIAGNOSIS — E11.649 TYPE 2 DIABETES MELLITUS WITH HYPOGLYCEMIA WITHOUT COMA, WITHOUT LONG-TERM CURRENT USE OF INSULIN: ICD-10-CM

## 2024-07-09 DIAGNOSIS — I65.21 CAROTID STENOSIS, ASYMPTOMATIC, RIGHT: ICD-10-CM

## 2024-07-09 DIAGNOSIS — E78.2 MIXED HYPERLIPIDEMIA: ICD-10-CM

## 2024-07-09 DIAGNOSIS — Z86.73 HISTORY OF TIA (TRANSIENT ISCHEMIC ATTACK): Primary | ICD-10-CM

## 2024-07-09 DIAGNOSIS — Z12.5 SCREENING FOR PROSTATE CANCER: ICD-10-CM

## 2024-07-09 DIAGNOSIS — R73.09 ELEVATED GLUCOSE: Primary | ICD-10-CM

## 2024-07-09 DIAGNOSIS — Z13.29 SCREENING FOR THYROID DISORDER: ICD-10-CM

## 2024-07-09 RX ORDER — ATORVASTATIN CALCIUM 80 MG/1
80 TABLET, FILM COATED ORAL NIGHTLY
Qty: 90 TABLET | Refills: 3 | Status: SHIPPED | OUTPATIENT
Start: 2024-07-09

## 2024-07-18 ENCOUNTER — LAB (OUTPATIENT)
Dept: LAB | Facility: HOSPITAL | Age: 59
End: 2024-07-18
Payer: COMMERCIAL

## 2024-07-18 DIAGNOSIS — I65.21 CAROTID STENOSIS, ASYMPTOMATIC, RIGHT: ICD-10-CM

## 2024-07-18 DIAGNOSIS — E78.2 MIXED HYPERLIPIDEMIA: ICD-10-CM

## 2024-07-18 DIAGNOSIS — R73.09 ELEVATED GLUCOSE: ICD-10-CM

## 2024-07-18 DIAGNOSIS — E11.649 TYPE 2 DIABETES MELLITUS WITH HYPOGLYCEMIA WITHOUT COMA, WITHOUT LONG-TERM CURRENT USE OF INSULIN: ICD-10-CM

## 2024-07-18 LAB
ALBUMIN SERPL-MCNC: 4.8 G/DL (ref 3.5–5.2)
ALBUMIN UR-MCNC: <1.2 MG/DL
ALBUMIN/GLOB SERPL: 2.3 G/DL
ALP SERPL-CCNC: 55 U/L (ref 39–117)
ALT SERPL W P-5'-P-CCNC: 42 U/L (ref 1–41)
ANION GAP SERPL CALCULATED.3IONS-SCNC: 11 MMOL/L (ref 5–15)
AST SERPL-CCNC: 29 U/L (ref 1–40)
BASOPHILS # BLD AUTO: 0.04 10*3/MM3 (ref 0–0.2)
BASOPHILS NFR BLD AUTO: 0.6 % (ref 0–1.5)
BILIRUB SERPL-MCNC: 0.8 MG/DL (ref 0–1.2)
BUN SERPL-MCNC: 9 MG/DL (ref 6–20)
BUN/CREAT SERPL: 10.1 (ref 7–25)
CALCIUM SPEC-SCNC: 9.4 MG/DL (ref 8.6–10.5)
CHLORIDE SERPL-SCNC: 102 MMOL/L (ref 98–107)
CHOLEST SERPL-MCNC: 93 MG/DL (ref 0–200)
CO2 SERPL-SCNC: 25 MMOL/L (ref 22–29)
CREAT SERPL-MCNC: 0.89 MG/DL (ref 0.76–1.27)
CREAT UR-MCNC: 176.5 MG/DL
DEPRECATED RDW RBC AUTO: 40.6 FL (ref 37–54)
EGFRCR SERPLBLD CKD-EPI 2021: 98.7 ML/MIN/1.73
EOSINOPHIL # BLD AUTO: 0.09 10*3/MM3 (ref 0–0.4)
EOSINOPHIL NFR BLD AUTO: 1.3 % (ref 0.3–6.2)
ERYTHROCYTE [DISTWIDTH] IN BLOOD BY AUTOMATED COUNT: 11.8 % (ref 12.3–15.4)
GLOBULIN UR ELPH-MCNC: 2.1 GM/DL
GLUCOSE SERPL-MCNC: 99 MG/DL (ref 65–99)
HBA1C MFR BLD: 5.6 % (ref 4.8–5.6)
HCT VFR BLD AUTO: 44.8 % (ref 37.5–51)
HDLC SERPL-MCNC: 33 MG/DL (ref 40–60)
HGB BLD-MCNC: 15.5 G/DL (ref 13–17.7)
IMM GRANULOCYTES # BLD AUTO: 0.04 10*3/MM3 (ref 0–0.05)
IMM GRANULOCYTES NFR BLD AUTO: 0.6 % (ref 0–0.5)
LDLC SERPL CALC-MCNC: 44 MG/DL (ref 0–100)
LDLC/HDLC SERPL: 1.36 {RATIO}
LYMPHOCYTES # BLD AUTO: 1.98 10*3/MM3 (ref 0.7–3.1)
LYMPHOCYTES NFR BLD AUTO: 28.6 % (ref 19.6–45.3)
MCH RBC QN AUTO: 32.2 PG (ref 26.6–33)
MCHC RBC AUTO-ENTMCNC: 34.6 G/DL (ref 31.5–35.7)
MCV RBC AUTO: 92.9 FL (ref 79–97)
MICROALBUMIN/CREAT UR: NORMAL MG/G{CREAT}
MONOCYTES # BLD AUTO: 0.81 10*3/MM3 (ref 0.1–0.9)
MONOCYTES NFR BLD AUTO: 11.7 % (ref 5–12)
NEUTROPHILS NFR BLD AUTO: 3.96 10*3/MM3 (ref 1.7–7)
NEUTROPHILS NFR BLD AUTO: 57.2 % (ref 42.7–76)
NRBC BLD AUTO-RTO: 0 /100 WBC (ref 0–0.2)
PLATELET # BLD AUTO: 191 10*3/MM3 (ref 140–450)
PMV BLD AUTO: 9.7 FL (ref 6–12)
POTASSIUM SERPL-SCNC: 4.5 MMOL/L (ref 3.5–5.2)
PROT SERPL-MCNC: 6.9 G/DL (ref 6–8.5)
RBC # BLD AUTO: 4.82 10*6/MM3 (ref 4.14–5.8)
SODIUM SERPL-SCNC: 138 MMOL/L (ref 136–145)
TRIGL SERPL-MCNC: 75 MG/DL (ref 0–150)
VLDLC SERPL-MCNC: 16 MG/DL (ref 5–40)
WBC NRBC COR # BLD AUTO: 6.92 10*3/MM3 (ref 3.4–10.8)

## 2024-07-18 PROCEDURE — 80050 GENERAL HEALTH PANEL: CPT

## 2024-07-18 PROCEDURE — 80061 LIPID PANEL: CPT

## 2024-07-18 PROCEDURE — 82570 ASSAY OF URINE CREATININE: CPT

## 2024-07-18 PROCEDURE — G0103 PSA SCREENING: HCPCS | Performed by: NURSE PRACTITIONER

## 2024-07-18 PROCEDURE — 82043 UR ALBUMIN QUANTITATIVE: CPT

## 2024-07-18 PROCEDURE — 36415 COLL VENOUS BLD VENIPUNCTURE: CPT

## 2024-07-18 PROCEDURE — 83036 HEMOGLOBIN GLYCOSYLATED A1C: CPT

## 2024-07-29 RX ORDER — PANTOPRAZOLE SODIUM 20 MG/1
20 TABLET, DELAYED RELEASE ORAL DAILY
Qty: 90 TABLET | Refills: 3 | Status: SHIPPED | OUTPATIENT
Start: 2024-07-29 | End: 2025-07-29

## 2024-07-29 RX ORDER — CLOPIDOGREL BISULFATE 75 MG/1
75 TABLET ORAL DAILY
Qty: 90 TABLET | Refills: 1 | Status: SHIPPED | OUTPATIENT
Start: 2024-07-29

## 2024-09-20 ENCOUNTER — OFFICE VISIT (OUTPATIENT)
Dept: FAMILY MEDICINE CLINIC | Facility: CLINIC | Age: 59
End: 2024-09-20
Payer: COMMERCIAL

## 2024-09-20 VITALS
HEIGHT: 69 IN | RESPIRATION RATE: 19 BRPM | OXYGEN SATURATION: 97 % | WEIGHT: 210 LBS | HEART RATE: 70 BPM | DIASTOLIC BLOOD PRESSURE: 100 MMHG | TEMPERATURE: 97.5 F | SYSTOLIC BLOOD PRESSURE: 150 MMHG | BODY MASS INDEX: 31.1 KG/M2

## 2024-09-20 DIAGNOSIS — Z11.4 ENCOUNTER FOR SCREENING FOR HUMAN IMMUNODEFICIENCY VIRUS (HIV): ICD-10-CM

## 2024-09-20 DIAGNOSIS — Z20.2 STD EXPOSURE: Primary | ICD-10-CM

## 2024-09-20 DIAGNOSIS — Z11.3 SCREEN FOR STD (SEXUALLY TRANSMITTED DISEASE): ICD-10-CM

## 2024-09-20 PROCEDURE — 99213 OFFICE O/P EST LOW 20 MIN: CPT | Performed by: NURSE PRACTITIONER

## 2024-09-20 NOTE — PATIENT INSTRUCTIONS
Discharge instructions,  Labs today the results should be next week and call me for results,  Make sure you get these any difficulty getting your results please let me know here to help you,    Blood pressure likely situational elevated due to topic of discussion however we want to make sure  As long as  you are feeling good simply recheck your blood pressure tonight or tomorrow    Any chest pain shortness of breath other difficulties emergency room,  If your blood pressures 160 or above consistently then going to urgent care

## 2024-09-24 LAB
C TRACH RRNA SPEC QL NAA+PROBE: NEGATIVE
HIV 1+2 AB+HIV1 P24 AG SERPL QL IA: NON REACTIVE
N GONORRHOEA RRNA SPEC QL NAA+PROBE: NEGATIVE
RPR SER QL: NON REACTIVE
T VAGINALIS RRNA SPEC QL NAA+PROBE: NEGATIVE

## 2024-10-02 ENCOUNTER — TELEPHONE (OUTPATIENT)
Dept: NEUROLOGY | Facility: CLINIC | Age: 59
End: 2024-10-02
Payer: COMMERCIAL

## 2024-10-02 NOTE — TELEPHONE ENCOUNTER
Provider: JOHNSON    Caller: DWAIN    Phone Number: [phone]     Reason for Call: PT CALLED AND STATES THAT HE HAS BEEN HAVING ISSUES WITH HIGH BLOOD PRESSURE FOR ABOUT A WEEK NOW AND IT FEELS DIFFERENT THAN IN THE PAST WITH HIS HIGH BLOOD PRESSURE. PT BLOOD PRESSURE HAS NOT BEEN BELOW 134-75 AAND LAST TIME IT WAS TAKEN IT WAS  146/86. PT IS CONCERNED AND IS WANTING TO KNOW IF THIS IS SOMETHING PROVIDER WOULD LOOK FLOYD OR IF PT IS NEEDING TO SEE PCP.    PLEASE REVIEW AND ADVISE  THANK YOU

## 2024-10-02 NOTE — TELEPHONE ENCOUNTER
I spoke with pt and informed him that the provider he sees in our office is out of office for the rest of the week.  He says that he has an appt with his PCP tomorrow.  I advised pt if he has any worsening symptoms or starts to feel lightheaded or off balance before his appt , please proceed to the nearest ED for evaluation.  Pt voices understanding and agrees.

## 2024-10-03 ENCOUNTER — OFFICE VISIT (OUTPATIENT)
Dept: FAMILY MEDICINE CLINIC | Facility: CLINIC | Age: 59
End: 2024-10-03
Payer: COMMERCIAL

## 2024-10-03 VITALS
BODY MASS INDEX: 31.99 KG/M2 | DIASTOLIC BLOOD PRESSURE: 100 MMHG | SYSTOLIC BLOOD PRESSURE: 150 MMHG | WEIGHT: 216 LBS | OXYGEN SATURATION: 98 % | HEIGHT: 69 IN | HEART RATE: 73 BPM | RESPIRATION RATE: 17 BRPM

## 2024-10-03 DIAGNOSIS — I10 PRIMARY HYPERTENSION: Primary | ICD-10-CM

## 2024-10-03 PROCEDURE — 99213 OFFICE O/P EST LOW 20 MIN: CPT | Performed by: FAMILY MEDICINE

## 2024-10-03 RX ORDER — LISINOPRIL/HYDROCHLOROTHIAZIDE 10-12.5 MG
1 TABLET ORAL DAILY
Qty: 30 TABLET | Refills: 1 | Status: SHIPPED | OUTPATIENT
Start: 2024-10-03 | End: 2024-10-08 | Stop reason: SDUPTHER

## 2024-10-03 NOTE — PROGRESS NOTES
"Chief Complaint  Chief Complaint   Patient presents with    Hypertension     Pt c/o elevated BP with home cuff. Pt states he is getting lightheaded at times        Subjective    History of Present Illness        Ramirez Pena presents to [facility] for   Hypertension  This is a recurrent problem. The current episode started 1 to 4 weeks ago. The problem has been worse since onset. Associated symptoms include headaches, malaise/fatigue and peripheral edema. Pertinent negatives include no anxiety, blurred vision, chest pain, orthopnea, palpitations or shortness of breath. Agents associated with hypertension include NSAIDs. Risk factors for coronary artery disease include male gender, obesity and family history. There are no compliance problems.  There is no history of coarctation of the aorta, hyperaldosteronism, hypercortisolism, hyperparathyroidism or pheochromocytoma.      History of Present Illness      Objective   Vital Signs:   Visit Vitals  /100   Pulse 73   Resp 17   Ht 175.3 cm (69.02\")   Wt 98 kg (216 lb)   SpO2 98%   BMI 31.88 kg/m²                Physical Exam  Vitals reviewed.   Constitutional:       Appearance: He is well-developed.   HENT:      Head: Normocephalic.      Right Ear: External ear normal.      Left Ear: External ear normal.      Nose: Nose normal.   Eyes:      Conjunctiva/sclera: Conjunctivae normal.   Cardiovascular:      Rate and Rhythm: Normal rate and regular rhythm.   Pulmonary:      Effort: Pulmonary effort is normal.      Breath sounds: Normal breath sounds.   Musculoskeletal:         General: Normal range of motion.      Cervical back: Normal range of motion and neck supple.   Skin:     General: Skin is warm and dry.      Capillary Refill: Capillary refill takes less than 2 seconds.   Neurological:      Mental Status: He is alert and oriented to person, place, and time.        Physical Exam           Result Review :  Results                     "        Assessment and Plan      Diagnoses and all orders for this visit:    1. Primary hypertension (Primary)  Assessment & Plan:  Hypertension is uncontrolled  Medication changes per orders.  Dietary sodium restriction.  Weight loss.  Regular aerobic exercise.  Blood pressure will be reassessedin 4 weeks.    Orders:  -     Discontinue: lisinopril-hydrochlorothiazide (PRINZIDE,ZESTORETIC) 10-12.5 MG per tablet; Take 1 tablet by mouth Daily.  Dispense: 30 tablet; Refill: 1       Assessment & Plan             Follow Up   No follow-ups on file.  Patient was given instructions and counseling regarding his condition or for health maintenance advice. Please see specific information pulled into the AVS if appropriate.       Answers submitted by the patient for this visit:  Primary Reason for Visit (Submitted on 10/2/2024)  What is the primary reason for your visit?: High Blood Pressure

## 2024-10-07 NOTE — PROGRESS NOTES
"Chief Complaint  cephalix     Carroll Pena presents to [facility] PRIMARY CARE  History of Present Illness  Very pleasant patient here today, reports he was informed he may have potentially been exposed to syphilis, has had no symptoms he has no burning frequency urgency he has no lesions on his penis or any history of any painless ulcerations, there is no suspicious rashes or recurrent arthralgias or any cognitive difficulties, generally good health.  He reports he had syphilis many years ago, treated appropriately at that time,  No hypertension, thinks blood pressure situationally elevated,        Objective   Vital Signs:  /100 (BP Location: Right arm, Patient Position: Sitting, Cuff Size: Adult)   Pulse 70   Temp 97.5 °F (36.4 °C) (Infrared)   Resp 19   Ht 175.3 cm (69.02\")   Wt 95.3 kg (210 lb)   SpO2 97%   BMI 31.00 kg/m²   Estimated body mass index is 31 kg/m² as calculated from the following:    Height as of this encounter: 175.3 cm (69.02\").    Weight as of this encounter: 95.3 kg (210 lb).            Physical Exam  Constitutional:       Appearance: Normal appearance.      Comments: Very pleasant appropriate   Eyes:      Conjunctiva/sclera: Conjunctivae normal.      Pupils: Pupils are equal, round, and reactive to light.   Pulmonary:      Effort: Pulmonary effort is normal. No respiratory distress.   Skin:     General: Skin is warm and dry.      Capillary Refill: Capillary refill takes less than 2 seconds.   Neurological:      Mental Status: He is alert.   Psychiatric:         Mood and Affect: Mood normal.         Behavior: Behavior normal.         Thought Content: Thought content normal.         Judgment: Judgment normal.        Result Review :                Assessment and Plan   Diagnoses and all orders for this visit:    1. STD exposure (Primary)  -     RPR, Rfx Qn RPR / Confirm TP ([facility])  -     HIV-1 / O / 2 Ag / Antibody  -     Chlamydia " trachomatis, Neisseria gonorrhoeae, Trichomonas vaginalis, PCR - Urine, Urine, Clean Catch    2. Encounter for screening for human immunodeficiency virus (HIV)  -     RPR, Rfx Qn RPR / Confirm TP ([facility])  -     HIV-1 / O / 2 Ag / Antibody  -     Chlamydia trachomatis, Neisseria gonorrhoeae, Trichomonas vaginalis, PCR - Urine, Urine, Clean Catch    3. Screen for STD (sexually transmitted disease)  -     RPR, Rfx Qn RPR / Confirm TP ([facility])  -     HIV-1 / O / 2 Ag / Antibody  -     Chlamydia trachomatis, Neisseria gonorrhoeae, Trichomonas vaginalis, PCR - Urine, Urine, Clean Catch           I spent 20  minutes caring for Ramirez on this date of service. This time includes time spent by me in the following activities    Appropriate history physical exam,   Counseling, testing,   follow Up   No follow-ups on file.  Patient was given instructions and counseling regarding his condition or for health maintenance advice. Please see specific information pulled into the AVS if appropriate.     Patient Instructions   Discharge instructions,  Labs today the results should be next week and call me for results,  Make sure you get these any difficulty getting your results please let me know here to help you,    Blood pressure likely situational elevated due to topic of discussion however we want to make sure  As long as  you are feeling good simply recheck your blood pressure tonight or tomorrow    Any chest pain shortness of breath other difficulties emergency room,  If your blood pressures 160 or above consistently then going to urgent care

## 2024-10-08 ENCOUNTER — OFFICE VISIT (OUTPATIENT)
Dept: FAMILY MEDICINE CLINIC | Facility: CLINIC | Age: 59
End: 2024-10-08
Payer: COMMERCIAL

## 2024-10-08 VITALS
WEIGHT: 221.2 LBS | BODY MASS INDEX: 32.76 KG/M2 | HEART RATE: 67 BPM | HEIGHT: 69 IN | DIASTOLIC BLOOD PRESSURE: 72 MMHG | OXYGEN SATURATION: 97 % | SYSTOLIC BLOOD PRESSURE: 116 MMHG

## 2024-10-08 DIAGNOSIS — M79.674 GREAT TOE PAIN, RIGHT: ICD-10-CM

## 2024-10-08 DIAGNOSIS — I10 PRIMARY HYPERTENSION: ICD-10-CM

## 2024-10-08 DIAGNOSIS — Z00.00 ANNUAL PHYSICAL EXAM: Primary | ICD-10-CM

## 2024-10-08 PROCEDURE — 90656 IIV3 VACC NO PRSV 0.5 ML IM: CPT | Performed by: NURSE PRACTITIONER

## 2024-10-08 PROCEDURE — 90471 IMMUNIZATION ADMIN: CPT | Performed by: NURSE PRACTITIONER

## 2024-10-08 PROCEDURE — 99396 PREV VISIT EST AGE 40-64: CPT | Performed by: NURSE PRACTITIONER

## 2024-10-08 RX ORDER — LISINOPRIL/HYDROCHLOROTHIAZIDE 10-12.5 MG
1 TABLET ORAL DAILY
Qty: 30 TABLET | Refills: 1 | Status: SHIPPED | OUTPATIENT
Start: 2024-10-08

## 2024-10-08 NOTE — PROGRESS NOTES
"Chief Complaint  Annual Exam    Subjective        Ramirez Pena presents to [facility] PRIMARY [facility]ARE  History of Present Illness  History of Present Illness  The patient presents for evaluation of multiple medical concerns.    He reports a significant improvement in his condition. He had a consultation with Dr. Calderon last week due to elevated blood pressure, which was recorded as 180 systolic. His blood pressure has been fluctuating, but he was unaware of this until Dr. Caldeorn informed him. He was prescribed lisinopril HCTZ, which has since helped to lower his blood pressure. However, he occasionally experiences lightheadedness. He also mentions that his home blood pressure monitor shows varying readings within a short span of time, which he finds confusing.    He is currently on Ozempic 0.5 mg, but it has not resulted in any weight loss. He is considering increasing the dosage to 1 mg. He also reports experiencing constipation, which he finds bothersome.    He expresses interest in receiving the influenza vaccine today. He has an eye exam scheduled for Friday, along with the latest COVID-19 and pneumonia vaccines. He does not require any medication refills at this time. He generally sleeps well, although there have been instances where he struggles to fall asleep. He has been working from home since 2003.    Supplemental Information  He has had an eye exam about 3 weeks ago. He goes twice a year once for vision and then once because they found something in there like the start of glaucoma.    Objective   Vital Signs:  /72 (BP Location: Left arm, Patient Position: Sitting, Cuff Size: Large Adult)   Pulse 67   Ht 175.3 cm (69.02\")   Wt 100 kg (221 lb 3.2 oz)   SpO2 97%   BMI 32.65 kg/m²   Estimated body mass index is 32.65 kg/m² as calculated from the following:    Height as of this encounter: 175.3 cm (69.02\").    Weight as of this encounter: 100 kg (221 lb 3.2 oz).         "       Physical Exam  Vitals reviewed.   Constitutional:       General: He is not in acute distress.     Appearance: He is well-developed. He is not diaphoretic.   HENT:      Head: Normocephalic and atraumatic.      Right Ear: Tympanic membrane, ear canal and external ear normal.      Left Ear: Tympanic membrane, ear canal and external ear normal.      Nose: Nose normal.      Mouth/Throat:      Mouth: Mucous membranes are moist.      Pharynx: Oropharynx is clear. Uvula midline. No oropharyngeal exudate.   Eyes:      Conjunctiva/sclera: Conjunctivae normal.      Pupils: Pupils are equal, round, and reactive to light.   Cardiovascular:      Rate and Rhythm: Normal rate and regular rhythm.      Heart sounds: Normal heart sounds. No murmur heard.     No friction rub. No gallop.   Pulmonary:      Effort: Pulmonary effort is normal. No respiratory distress.      Breath sounds: Normal breath sounds. No wheezing or rales.   Abdominal:      General: Bowel sounds are normal. There is no distension.      Palpations: Abdomen is soft.      Tenderness: There is no abdominal tenderness.   Musculoskeletal:      Cervical back: Neck supple.   Lymphadenopathy:      Cervical: No cervical adenopathy.   Skin:     General: Skin is warm and dry.   Neurological:      Mental Status: He is alert and oriented to person, place, and time.   Psychiatric:         Mood and Affect: Mood normal.       Physical Exam  Lungs sound great.  Heart sounds good.    Result Review :          Results  Laboratory Studies  A1c is 5.6.    Assessment & Plan  1. Hypertension.  Blood pressure readings have been fluctuating but are currently within the normal range. He is on lisinopril and HCTZ, which have helped stabilize his blood pressure. A 90-day supply of lisinopril will be provided. A Comprehensive Metabolic Panel (CMP) will be conducted in a month to assess kidney function and potassium levels. If his blood pressure becomes problematic, he is advised to seek  immediate medical attention for a blood pressure check. If hypotension is detected, the lisinopril dosage may be reduced by half.    2. Diabetes Mellitus.  His Hemoglobin A1c level is 5.6, which is within the normal range. He is currently on Ozempic 0.5 mg but has not experienced weight loss. He will be switched to Ozempic 1 mg to potentially aid in weight loss. He is advised to complete the remaining Ozempic 0.5 mg he has at home before starting the new prescription. The new prescription will be sent to [facility] pharmacy.    3. Allergic Rhinitis.  He reports feeling drainage and experiencing allergies over the past few days. Examination shows some drainage. No new medications are prescribed at this time.    4. Health Maintenance.  Recent blood work results are satisfactory. He is due for a colonoscopy in 2026. An influenza vaccine will be administered today. He is scheduled to receive the COVID-19 and pneumonia vaccines on Friday. A diabetic foot exam will be conducted today.    Follow-up  Return in 6 months for follow-up.           Assessment and Plan     Diagnoses and all orders for this visit:    1. Annual physical exam (Primary)    2. Primary hypertension  -     lisinopril-hydrochlorothiazide (PRINZIDE,ZESTORETIC) 10-12.5 MG per tablet; Take 1 tablet by mouth Daily.  Dispense: 30 tablet; Refill: 1  -     Comprehensive metabolic panel; Future    3. Great toe pain, right  -     Ambulatory Referral to Podiatry    Other orders  -     Fluzone >6mos ([id number])  -     Semaglutide, 1 MG/DOSE, (OZEMPIC) 4 MG/3ML solution pen-injector; Inject 1 mg under the skin into the appropriate area as directed 1 (One) Time Per Week.  Dispense: 9 mL; Refill: 1        Information/counseling provided to the patient regarding periodic dwain maintenance recommendations, including but not limited to immunizations, diet/exercise/healthy lifestyle, laboratory, and other screenings. BMI is discussed. Appropriate exercise, diet, and weight  plans are discussed.         Follow Up     No follow-ups on file.  Patient was given instructions and counseling regarding his condition or for health maintenance advice. Please see specific information pulled into the AVS if appropriate.       Patient or patient representative verbalized consent for the use of Ambient Listening during the visit with  IVETTE Welch for chart documentation. 10/8/2024  10:17 EDT

## 2024-10-19 PROBLEM — I10 PRIMARY HYPERTENSION: Status: ACTIVE | Noted: 2024-10-19

## 2024-10-19 NOTE — ASSESSMENT & PLAN NOTE
Hypertension is uncontrolled  Medication changes per orders.  Dietary sodium restriction.  Weight loss.  Regular aerobic exercise.  Blood pressure will be reassessedin 4 weeks.

## 2024-10-24 RX ORDER — MONTELUKAST SODIUM 10 MG/1
10 TABLET ORAL DAILY
Qty: 90 TABLET | Refills: 3 | Status: SHIPPED | OUTPATIENT
Start: 2024-10-24

## 2024-11-21 DIAGNOSIS — Z11.3 SCREENING EXAMINATION FOR STI: Primary | ICD-10-CM

## 2024-12-19 ENCOUNTER — OFFICE VISIT (OUTPATIENT)
Dept: SLEEP MEDICINE | Facility: HOSPITAL | Age: 59
End: 2024-12-19
Payer: COMMERCIAL

## 2024-12-19 VITALS
HEART RATE: 74 BPM | BODY MASS INDEX: 31.99 KG/M2 | DIASTOLIC BLOOD PRESSURE: 83 MMHG | HEIGHT: 69 IN | WEIGHT: 216 LBS | SYSTOLIC BLOOD PRESSURE: 120 MMHG | OXYGEN SATURATION: 97 %

## 2024-12-19 DIAGNOSIS — G47.33 OSA ON CPAP: Primary | ICD-10-CM

## 2024-12-19 DIAGNOSIS — E66.09 CLASS 1 OBESITY DUE TO EXCESS CALORIES WITHOUT SERIOUS COMORBIDITY WITH BODY MASS INDEX (BMI) OF 31.0 TO 31.9 IN ADULT: ICD-10-CM

## 2024-12-19 DIAGNOSIS — E66.811 CLASS 1 OBESITY DUE TO EXCESS CALORIES WITHOUT SERIOUS COMORBIDITY WITH BODY MASS INDEX (BMI) OF 31.0 TO 31.9 IN ADULT: ICD-10-CM

## 2024-12-19 PROCEDURE — G0463 HOSPITAL OUTPT CLINIC VISIT: HCPCS

## 2024-12-19 PROCEDURE — 99213 OFFICE O/P EST LOW 20 MIN: CPT | Performed by: INTERNAL MEDICINE

## 2024-12-19 NOTE — PROGRESS NOTES
"  [facility]  Sleep medicine  [address]  [address]  [place] [address]  Phone   Fax       SLEEP CLINIC FOLLOW UP PROGRESS NOTE.    Ramirez Pena  [id number]   1965  59 y.o.  male      PCP: Carmina Awad APRN      Date of visit: 12/19/2024    Chief Complaint   Patient presents with    Sleep Apnea    Obesity       HPI:  This is a 59 y.o. years old patient is here for the management of obstructive sleep apnea.  Sleep apnea is mild in severity with a AHI of 13/hr. Patient is using positive airway pressure therapy with auto CPAP and the symptoms of sleep apnea have improved significantly on the therapy. Normally patient goes to bed at 10 PM and wakes up at 6 AM .  The patient wakes up 1-2 time(s) during the night and has no problem going back to sleep.  Feels refreshed after waking up.  He works in [employer]    Medications and allergies are reviewed by me and documented in the encounter.     SOCIAL (habits pertaining to sleep medicine)  History tobacco use:Yes   History of alcohol use: 0 per week  Caffeine use: 3     REVIEW OF SYSTEMS:   Pertaining positive symptoms are:  [place] Sleepiness Scale :Total score: 1         PHYSICAL EXAMINATION:  CONSTITUTIONAL:  Vitals:    12/19/24 1452   BP: 120/83   Pulse: 74   SpO2: 97%   Weight: 98 kg (216 lb)   Height: 175.3 cm (69.02\")    Body mass index is 31.88 kg/m².   NOSE: nasal passages are clear, No deformities noted   RESP SYSTEM: Not in any respiratory distress, no chest deformities noted,   CARDIOVASULAR: No edema noted  NEURO: Oriented x 3, gait normal,  Mood and affect appeared appropriate      Data reviewed:  The Smart card downloaded on 12/19/2024 has been reviewed independently by me for compliance and discussed the data with the patient.   Compliance; 100%  More than 4 hr use, 91%  Average use of the device 6 hours and 55 minutes per night  Residual AHI: 1.5 /hr (goal < 5.0 /hr)  Mask type: Nasal " pillows  Device: ResMed  DME: [employer]      ASSESSMENT AND PLAN:  Obstructive sleep apnea ( G 47.33).  The symptoms of sleep apnea have improved with the device and the treatment.  Patient's compliance with the device is excellent for treatment of sleep apnea.  I have independently reviewed the smart card down load and discussed with the patient the download data and encouarged the patient to continue to use the device.The residual AHI is acceptable. The device is benefiting the patient and the device is medically necessary.  Without proper control of sleep apnea and good compliance there is a increased risk for hypertension, diabetes mellitus and nonrestorative sleep with hypersomnia which can increase risk for motor vehicle accidents.  Untreated sleep apnea is also a risk factor for development of atrial fibrillation, pulmonary hypertension, insulin resistance and stroke. The patient is also instructed to get the supplies from the DME company and and change them on a regular basis.  A prescription for supplies has been sent to the DME company.  I have also discussed the good sleep hygiene habits and adequate amount of sleep needed for good health.  Obesity  1 with BMI is Body mass index is 31.88 kg/m².. I have discuss the relationship between the weight and sleep apnea. The benefit of weight loss in reducing severity of sleep apnea was discussed. Discussed diet and exercise with the patient to achieve ideal BMI.   Hypertension  Return in about 1 year (around 12/19/2025). . Patient's questions were answered.    12/19/2024  Kameron Nolasco MD  Sleep Medicine.  Medical Director,   [facility].

## 2025-01-12 DIAGNOSIS — I10 PRIMARY HYPERTENSION: ICD-10-CM

## 2025-01-13 NOTE — TELEPHONE ENCOUNTER
Rx Refill Note  Requested Prescriptions     Pending Prescriptions Disp Refills    lisinopril-hydrochlorothiazide (PRINZIDE,ZESTORETIC) 10-12.5 MG per tablet 30 tablet 1     Sig: Take 1 tablet by mouth Daily.      Last office visit with prescribing clinician: 10/8/2024   Last telemedicine visit with prescribing clinician: Visit date not found   Next office visit with prescribing clinician: 4/8/2025                         Would you like a call back once the refill request has been completed: [] Yes [] No    If the office needs to give you a call back, can they leave a voicemail: [] Yes [] No    Can Boone MA  01/13/25, 08:47 EST

## 2025-01-14 RX ORDER — LISINOPRIL AND HYDROCHLOROTHIAZIDE 10; 12.5 MG/1; MG/1
1 TABLET ORAL DAILY
Qty: 90 TABLET | Refills: 1 | Status: SHIPPED | OUTPATIENT
Start: 2025-01-14

## 2025-01-20 RX ORDER — CLOPIDOGREL BISULFATE 75 MG/1
75 TABLET ORAL DAILY
Qty: 90 TABLET | Refills: 1 | Status: SHIPPED | OUTPATIENT
Start: 2025-01-20

## 2025-04-08 ENCOUNTER — OFFICE VISIT (OUTPATIENT)
Dept: FAMILY MEDICINE CLINIC | Facility: CLINIC | Age: 60
End: 2025-04-08
Payer: COMMERCIAL

## 2025-04-08 VITALS
SYSTOLIC BLOOD PRESSURE: 126 MMHG | OXYGEN SATURATION: 100 % | HEART RATE: 72 BPM | BODY MASS INDEX: 31.99 KG/M2 | WEIGHT: 216 LBS | DIASTOLIC BLOOD PRESSURE: 88 MMHG | HEIGHT: 69 IN

## 2025-04-08 DIAGNOSIS — J30.1 SEASONAL ALLERGIC RHINITIS DUE TO POLLEN: ICD-10-CM

## 2025-04-08 DIAGNOSIS — Z12.5 SCREENING FOR PROSTATE CANCER: ICD-10-CM

## 2025-04-08 DIAGNOSIS — I10 PRIMARY HYPERTENSION: Primary | ICD-10-CM

## 2025-04-08 DIAGNOSIS — E11.9 TYPE 2 DIABETES MELLITUS WITHOUT COMPLICATION, WITHOUT LONG-TERM CURRENT USE OF INSULIN: ICD-10-CM

## 2025-04-08 DIAGNOSIS — Z13.29 SCREENING FOR THYROID DISORDER: ICD-10-CM

## 2025-04-08 NOTE — PROGRESS NOTES
Chief Complaint  Constipation and Follow-up    Subjective        Ramirez Pena presents to [facility] PRIMARY CARE  Constipation      History of Present Illness  The patient presents for evaluation of postnasal drainage, skin rash, constipation  and blood pressure management.    He reports experiencing postnasal drainage at night, which he attributes to seasonal allergies. This symptom has been absent for several years but has recently returned, causing his to cough during the night. He is currently on a regimen of Astelin, Flonase, Singulair, and Allegra, and is under the care of an allergist, Dr. Olivier. He has been receiving allergy injections for the past 5 years, with his most recent injection administered last Monday.    He continues to experience a skin rash, which temporarily resolves with the application of a prescribed cream but recurs upon discontinuation. He describes the rash as consisting of small bumps and suspects it may be a form of contact dermatitis. He also believes that dryness may be contributing to the rash and has been applying lotion as a preventative measure.    He experiences alternating periods of constipation lasting 2 to 3 days, followed by episodes of diarrhea. he is currently on Ozempic, which was initiated approximately 8 months ago and has provided some relief from constipation. However, he continues to experience discomfort due to gas. He is considering switching to Mounjaro after completing his current course of Ozempic. he has expressed interest in glucose monitoring but is hesitant due to a dislike of needles.    He is currently on medication for blood pressure management and is interested in exploring the possibility of discontinuing these medications.    MEDICATIONS  Astelin, Flonase, Singulair, Allegra, Ozempic, atorvastatin, Plavix    Objective   Vital Signs:  /88 (BP Location: Right arm, Patient Position: Sitting, Cuff Size: Large Adult)   Pulse 72  "  Ht 175.3 cm (69.02\")   Wt 98 kg (216 lb)   SpO2 100%   BMI 31.88 kg/m²   Estimated body mass index is 31.88 kg/m² as calculated from the following:    Height as of this encounter: 175.3 cm (69.02\").    Weight as of this encounter: 98 kg (216 lb).             Physical Exam  Constitutional:       General: He is not in acute distress.     Appearance: He is well-developed. He is not diaphoretic.   Cardiovascular:      Rate and Rhythm: Normal rate and regular rhythm.      Heart sounds: Normal heart sounds. No murmur heard.     No friction rub. No gallop.   Pulmonary:      Effort: Pulmonary effort is normal. No respiratory distress.      Breath sounds: Normal breath sounds. No wheezing or rales.   Musculoskeletal:      Cervical back: Neck supple.   Skin:     General: Skin is warm and dry.   Neurological:      Mental Status: He is alert and oriented to person, place, and time.       Physical Exam  Vital Signs  Blood pressure is 126/88.    Result Review :          Results  Laboratory Studies  A1c is 5.7.    Assessment & Plan  1. Allergic rhinitis  Managed by allergist, will continue current treatment to include flonase, astelin, allegra and montelukast    3. DM  Increased flatulence and abdominal discomfort associated with the medication, will trial mounjaro and monitor side effects, starting dose given with 5 mg maintenance, follow up in six months    4. Blood pressure management.  HTN is chronic and stable, continue current treatment           Assessment and Plan     Diagnoses and all orders for this visit:    1. Primary hypertension (Primary)  -     CBC & Differential  -     Comprehensive Metabolic Panel  -     Lipid Panel With LDL / HDL Ratio    2. Screening for prostate cancer  -     PSA Screen    3. Screening for thyroid disorder  -     TSH Rfx On Abnormal To Free T4    4. Type 2 diabetes mellitus without complication, without long-term current use of insulin  -     CBC & Differential  -     Comprehensive " Metabolic Panel  -     Lipid Panel With LDL / HDL Ratio  -     Hemoglobin A1c  -     Tirzepatide 2.5 MG/0.5ML solution auto-injector; Inject 2.5 mg under the skin into the appropriate area as directed 1 (One) Time Per Week.  Dispense: 2 mL; Refill: 0  -     Tirzepatide 5 MG/0.5ML solution auto-injector; Inject 5 mg under the skin into the appropriate area as directed 1 (One) Time Per Week.  Dispense: 6 mL; Refill: 0    5. Seasonal allergic rhinitis due to pollen             Follow Up     No follow-ups on file.  Patient was given instructions and counseling regarding his condition or for health maintenance advice. Please see specific information pulled into the AVS if appropriate.       Patient or patient representative verbalized consent for the use of Ambient Listening during the visit with  IVETTE Welch for chart documentation. 4/8/2025  12:56 EDT

## 2025-04-10 LAB
ALBUMIN SERPL-MCNC: 4.7 G/DL (ref 3.8–4.9)
ALP SERPL-CCNC: 58 IU/L (ref 44–121)
ALT SERPL-CCNC: 35 IU/L (ref 0–44)
AST SERPL-CCNC: 21 IU/L (ref 0–40)
BASOPHILS # BLD AUTO: 0.1 X10E3/UL (ref 0–0.2)
BASOPHILS NFR BLD AUTO: 1 %
BILIRUB SERPL-MCNC: 0.7 MG/DL (ref 0–1.2)
BUN SERPL-MCNC: 8 MG/DL (ref 6–24)
BUN/CREAT SERPL: 9 (ref 9–20)
C TRACH RRNA SPEC QL NAA+PROBE: NEGATIVE
CALCIUM SERPL-MCNC: 9.2 MG/DL (ref 8.7–10.2)
CHLORIDE SERPL-SCNC: 96 MMOL/L (ref 96–106)
CHOLEST SERPL-MCNC: 100 MG/DL (ref 100–199)
CO2 SERPL-SCNC: 22 MMOL/L (ref 20–29)
CREAT SERPL-MCNC: 0.89 MG/DL (ref 0.76–1.27)
EGFRCR SERPLBLD CKD-EPI 2021: 99 ML/MIN/1.73
EOSINOPHIL # BLD AUTO: 0.3 X10E3/UL (ref 0–0.4)
EOSINOPHIL NFR BLD AUTO: 4 %
ERYTHROCYTE [DISTWIDTH] IN BLOOD BY AUTOMATED COUNT: 11.7 % (ref 11.6–15.4)
GLOBULIN SER CALC-MCNC: 2 G/DL (ref 1.5–4.5)
GLUCOSE SERPL-MCNC: 86 MG/DL (ref 70–99)
HBA1C MFR BLD: 5.8 % (ref 4.8–5.6)
HCT VFR BLD AUTO: 43.1 % (ref 37.5–51)
HDLC SERPL-MCNC: 35 MG/DL
HGB BLD-MCNC: 15.1 G/DL (ref 13–17.7)
HIV 1+2 AB+HIV1 P24 AG SERPL QL IA: NON REACTIVE
IMM GRANULOCYTES # BLD AUTO: 0 X10E3/UL (ref 0–0.1)
IMM GRANULOCYTES NFR BLD AUTO: 0 %
LDLC SERPL CALC-MCNC: 49 MG/DL (ref 0–99)
LDLC/HDLC SERPL: 1.4 RATIO (ref 0–3.6)
LYMPHOCYTES # BLD AUTO: 1.6 X10E3/UL (ref 0.7–3.1)
LYMPHOCYTES NFR BLD AUTO: 24 %
MCH RBC QN AUTO: 32.5 PG (ref 26.6–33)
MCHC RBC AUTO-ENTMCNC: 35 G/DL (ref 31.5–35.7)
MCV RBC AUTO: 93 FL (ref 79–97)
MONOCYTES # BLD AUTO: 0.7 X10E3/UL (ref 0.1–0.9)
MONOCYTES NFR BLD AUTO: 10 %
N GONORRHOEA RRNA SPEC QL NAA+PROBE: NEGATIVE
NEUTROPHILS # BLD AUTO: 4.2 X10E3/UL (ref 1.4–7)
NEUTROPHILS NFR BLD AUTO: 61 %
PLATELET # BLD AUTO: 211 X10E3/UL (ref 150–450)
POTASSIUM SERPL-SCNC: 4.1 MMOL/L (ref 3.5–5.2)
PROT SERPL-MCNC: 6.7 G/DL (ref 6–8.5)
PSA SERPL-MCNC: 1 NG/ML (ref 0–4)
RBC # BLD AUTO: 4.65 X10E6/UL (ref 4.14–5.8)
RPR SER QL: NON REACTIVE
SODIUM SERPL-SCNC: 133 MMOL/L (ref 134–144)
TRIGL SERPL-MCNC: 80 MG/DL (ref 0–149)
TSH SERPL DL<=0.005 MIU/L-ACNC: 1.74 UIU/ML (ref 0.45–4.5)
VLDLC SERPL CALC-MCNC: 16 MG/DL (ref 5–40)
WBC # BLD AUTO: 6.8 X10E3/UL (ref 3.4–10.8)

## 2025-04-30 ENCOUNTER — HOSPITAL ENCOUNTER (OUTPATIENT)
Facility: HOSPITAL | Age: 60
Setting detail: OBSERVATION
Discharge: HOME | End: 2025-05-02
Attending: STUDENT IN AN ORGANIZED HEALTH CARE EDUCATION/TRAINING PROGRAM | Admitting: EMERGENCY MEDICINE
Payer: COMMERCIAL

## 2025-04-30 ENCOUNTER — NURSE TRIAGE (OUTPATIENT)
Dept: CALL CENTER | Facility: HOSPITAL | Age: 60
End: 2025-04-30
Payer: COMMERCIAL

## 2025-04-30 DIAGNOSIS — R19.7 DIARRHEA, UNSPECIFIED TYPE: ICD-10-CM

## 2025-04-30 DIAGNOSIS — K62.5 BRBPR (BRIGHT RED BLOOD PER RECTUM): Primary | ICD-10-CM

## 2025-04-30 LAB
ALBUMIN SERPL-MCNC: 4.4 G/DL (ref 3.5–5.2)
ALBUMIN/GLOB SERPL: 2.1 G/DL
ALP SERPL-CCNC: 62 U/L (ref 39–117)
ALT SERPL W P-5'-P-CCNC: 23 U/L (ref 1–41)
ANION GAP SERPL CALCULATED.3IONS-SCNC: 10 MMOL/L (ref 5–15)
AST SERPL-CCNC: 20 U/L (ref 1–40)
BASOPHILS # BLD AUTO: 0.05 10*3/MM3 (ref 0–0.2)
BASOPHILS NFR BLD AUTO: 0.7 % (ref 0–1.5)
BILIRUB SERPL-MCNC: 0.7 MG/DL (ref 0–1.2)
BUN SERPL-MCNC: 7 MG/DL (ref 6–20)
BUN/CREAT SERPL: 9 (ref 7–25)
CALCIUM SPEC-SCNC: 8.7 MG/DL (ref 8.6–10.5)
CHLORIDE SERPL-SCNC: 96 MMOL/L (ref 98–107)
CO2 SERPL-SCNC: 21 MMOL/L (ref 22–29)
CREAT SERPL-MCNC: 0.78 MG/DL (ref 0.76–1.27)
DEPRECATED RDW RBC AUTO: 40.2 FL (ref 37–54)
EGFRCR SERPLBLD CKD-EPI 2021: 102.7 ML/MIN/1.73
EOSINOPHIL # BLD AUTO: 0.27 10*3/MM3 (ref 0–0.4)
EOSINOPHIL NFR BLD AUTO: 4 % (ref 0.3–6.2)
ERYTHROCYTE [DISTWIDTH] IN BLOOD BY AUTOMATED COUNT: 11.9 % (ref 12.3–15.4)
GLOBULIN UR ELPH-MCNC: 2.1 GM/DL
GLUCOSE BLDC GLUCOMTR-MCNC: 109 MG/DL (ref 70–130)
GLUCOSE SERPL-MCNC: 105 MG/DL (ref 65–99)
HCT VFR BLD AUTO: 39.7 % (ref 37.5–51)
HGB BLD-MCNC: 14.2 G/DL (ref 13–17.7)
HOLD SPECIMEN: NORMAL
IMM GRANULOCYTES # BLD AUTO: 0.02 10*3/MM3 (ref 0–0.05)
IMM GRANULOCYTES NFR BLD AUTO: 0.3 % (ref 0–0.5)
LYMPHOCYTES # BLD AUTO: 1.5 10*3/MM3 (ref 0.7–3.1)
LYMPHOCYTES NFR BLD AUTO: 22.1 % (ref 19.6–45.3)
MCH RBC QN AUTO: 33 PG (ref 26.6–33)
MCHC RBC AUTO-ENTMCNC: 35.8 G/DL (ref 31.5–35.7)
MCV RBC AUTO: 92.3 FL (ref 79–97)
MONOCYTES # BLD AUTO: 0.91 10*3/MM3 (ref 0.1–0.9)
MONOCYTES NFR BLD AUTO: 13.4 % (ref 5–12)
NEUTROPHILS NFR BLD AUTO: 4.05 10*3/MM3 (ref 1.7–7)
NEUTROPHILS NFR BLD AUTO: 59.5 % (ref 42.7–76)
NRBC BLD AUTO-RTO: 0 /100 WBC (ref 0–0.2)
PLATELET # BLD AUTO: 184 10*3/MM3 (ref 140–450)
PMV BLD AUTO: 9.2 FL (ref 6–12)
POTASSIUM SERPL-SCNC: 3.8 MMOL/L (ref 3.5–5.2)
PROT SERPL-MCNC: 6.5 G/DL (ref 6–8.5)
RBC # BLD AUTO: 4.3 10*6/MM3 (ref 4.14–5.8)
SODIUM SERPL-SCNC: 127 MMOL/L (ref 136–145)
WBC NRBC COR # BLD AUTO: 6.8 10*3/MM3 (ref 3.4–10.8)
WHOLE BLOOD HOLD COAG: NORMAL
WHOLE BLOOD HOLD SPECIMEN: NORMAL

## 2025-04-30 PROCEDURE — 85025 COMPLETE CBC W/AUTO DIFF WBC: CPT | Performed by: STUDENT IN AN ORGANIZED HEALTH CARE EDUCATION/TRAINING PROGRAM

## 2025-04-30 PROCEDURE — G0378 HOSPITAL OBSERVATION PER HR: HCPCS

## 2025-04-30 PROCEDURE — 99285 EMERGENCY DEPT VISIT HI MDM: CPT

## 2025-04-30 PROCEDURE — 86140 C-REACTIVE PROTEIN: CPT

## 2025-04-30 PROCEDURE — 80053 COMPREHEN METABOLIC PANEL: CPT | Performed by: STUDENT IN AN ORGANIZED HEALTH CARE EDUCATION/TRAINING PROGRAM

## 2025-04-30 PROCEDURE — 82948 REAGENT STRIP/BLOOD GLUCOSE: CPT

## 2025-04-30 RX ORDER — SEMAGLUTIDE 1.34 MG/ML
INJECTION, SOLUTION SUBCUTANEOUS WEEKLY
COMMUNITY
End: 2025-05-02 | Stop reason: HOSPADM

## 2025-04-30 RX ORDER — IBUPROFEN 600 MG/1
1 TABLET ORAL
Status: DISCONTINUED | OUTPATIENT
Start: 2025-04-30 | End: 2025-05-02 | Stop reason: HOSPADM

## 2025-04-30 RX ORDER — ONDANSETRON 4 MG/1
4 TABLET, ORALLY DISINTEGRATING ORAL EVERY 6 HOURS PRN
Status: DISCONTINUED | OUTPATIENT
Start: 2025-04-30 | End: 2025-05-02 | Stop reason: HOSPADM

## 2025-04-30 RX ORDER — PANTOPRAZOLE SODIUM 40 MG/1
40 TABLET, DELAYED RELEASE ORAL
Status: DISCONTINUED | OUTPATIENT
Start: 2025-05-01 | End: 2025-04-30

## 2025-04-30 RX ORDER — ATORVASTATIN CALCIUM 80 MG/1
80 TABLET, FILM COATED ORAL NIGHTLY
Status: DISCONTINUED | OUTPATIENT
Start: 2025-04-30 | End: 2025-05-02 | Stop reason: HOSPADM

## 2025-04-30 RX ORDER — CLOPIDOGREL BISULFATE 75 MG/1
75 TABLET ORAL DAILY
Status: DISCONTINUED | OUTPATIENT
Start: 2025-05-01 | End: 2025-05-02 | Stop reason: HOSPADM

## 2025-04-30 RX ORDER — HYDROCHLOROTHIAZIDE 12.5 MG/1
12.5 TABLET ORAL
Status: DISCONTINUED | OUTPATIENT
Start: 2025-05-01 | End: 2025-05-01

## 2025-04-30 RX ORDER — DEXTROSE MONOHYDRATE 25 G/50ML
25 INJECTION, SOLUTION INTRAVENOUS
Status: DISCONTINUED | OUTPATIENT
Start: 2025-04-30 | End: 2025-05-02 | Stop reason: HOSPADM

## 2025-04-30 RX ORDER — SODIUM CHLORIDE 0.9 % (FLUSH) 0.9 %
10 SYRINGE (ML) INJECTION AS NEEDED
Status: DISCONTINUED | OUTPATIENT
Start: 2025-04-30 | End: 2025-05-02 | Stop reason: HOSPADM

## 2025-04-30 RX ORDER — LISINOPRIL 10 MG/1
10 TABLET ORAL
Status: DISCONTINUED | OUTPATIENT
Start: 2025-05-01 | End: 2025-04-30

## 2025-04-30 RX ORDER — PANTOPRAZOLE SODIUM 40 MG/1
40 TABLET, DELAYED RELEASE ORAL
Status: DISCONTINUED | OUTPATIENT
Start: 2025-05-01 | End: 2025-05-01

## 2025-04-30 RX ORDER — LISINOPRIL 10 MG/1
10 TABLET ORAL
Status: DISCONTINUED | OUTPATIENT
Start: 2025-05-01 | End: 2025-05-01

## 2025-04-30 RX ORDER — NITROGLYCERIN 0.4 MG/1
0.4 TABLET SUBLINGUAL
Status: DISCONTINUED | OUTPATIENT
Start: 2025-04-30 | End: 2025-05-02 | Stop reason: HOSPADM

## 2025-04-30 RX ORDER — NICOTINE POLACRILEX 4 MG
15 LOZENGE BUCCAL
Status: DISCONTINUED | OUTPATIENT
Start: 2025-04-30 | End: 2025-05-02 | Stop reason: HOSPADM

## 2025-04-30 RX ORDER — SODIUM CHLORIDE 9 MG/ML
40 INJECTION, SOLUTION INTRAVENOUS AS NEEDED
Status: DISCONTINUED | OUTPATIENT
Start: 2025-04-30 | End: 2025-05-02 | Stop reason: HOSPADM

## 2025-04-30 RX ORDER — SODIUM CHLORIDE 0.9 % (FLUSH) 0.9 %
10 SYRINGE (ML) INJECTION EVERY 12 HOURS SCHEDULED
Status: DISCONTINUED | OUTPATIENT
Start: 2025-04-30 | End: 2025-05-02 | Stop reason: HOSPADM

## 2025-04-30 RX ORDER — ONDANSETRON 2 MG/ML
4 INJECTION INTRAMUSCULAR; INTRAVENOUS EVERY 6 HOURS PRN
Status: DISCONTINUED | OUTPATIENT
Start: 2025-04-30 | End: 2025-05-02 | Stop reason: HOSPADM

## 2025-04-30 RX ORDER — HYDROCHLOROTHIAZIDE 12.5 MG/1
12.5 TABLET ORAL
Status: DISCONTINUED | OUTPATIENT
Start: 2025-05-01 | End: 2025-04-30

## 2025-04-30 RX ORDER — INSULIN LISPRO 100 [IU]/ML
2-7 INJECTION, SOLUTION INTRAVENOUS; SUBCUTANEOUS
Status: DISCONTINUED | OUTPATIENT
Start: 2025-04-30 | End: 2025-05-02 | Stop reason: HOSPADM

## 2025-04-30 RX ADMIN — ATORVASTATIN CALCIUM 80 MG: 80 TABLET, FILM COATED ORAL at 23:53

## 2025-04-30 NOTE — TELEPHONE ENCOUNTER
"Had diarrhea that started this morning. Now has bloody stool. Reviewed protocol with patient. Advised to go to ER. Patient verbalizes agreement with plan.    Reason for Disposition   [1] MODERATE rectal bleeding (small blood clots, passing blood without stool, or toilet water turns red) AND [2] more than once a day    Additional Information   Negative: Shock suspected (e.g., cold/pale/clammy skin, too weak to stand, low BP, rapid pulse)   Negative: Difficult to awaken or acting confused (e.g., disoriented, slurred speech)   Negative: Passed out (i.e., lost consciousness, collapsed and was not responding)   Negative: [1] Vomiting AND [2] contains red blood or black (\"coffee ground\") material  (Exception: Few red streaks in vomit that only happened once.)   Negative: Sounds like a life-threatening emergency to the triager   Negative: Diarrhea is main symptom   Negative: Stool color other than brown or tan is main concern  (no bleeding and no melena)   Negative: SEVERE rectal bleeding (large blood clots; constant or on and off bleeding)   Negative: SEVERE dizziness (e.g., unable to stand, requires support to walk, feels like passing out now)    Answer Assessment - Initial Assessment Questions  1. APPEARANCE of BLOOD: \"What color is it?\" \"Is it passed separately, on the surface of the stool, or mixed in with the stool?\"       Bright red with some blood clots  2. AMOUNT: \"How much blood was passed?\"       Moderate   3. FREQUENCY: \"How many times has blood been passed with the stools?\"       3-4 times today  4. ONSET: \"When was the blood first seen in the stools?\" (Days or weeks)       This morning  5. DIARRHEA: \"Is there also some diarrhea?\" If Yes, ask: \"How many diarrhea stools in the past 24 hours?\"       Yes   6. CONSTIPATION: \"Do you have constipation?\" If Yes, ask: \"How bad is it?\"      No   7. RECURRENT SYMPTOMS: \"Have you had blood in your stools before?\" If Yes, ask: \"When was the last time?\" and \"What happened " "that time?\"       Yes, with fissures  8. BLOOD THINNERS: \"Do you take any blood thinners?\" (e.g., Coumadin/warfarin, Pradaxa/dabigatran, aspirin)      Plavix  9. OTHER SYMPTOMS: \"Do you have any other symptoms?\"  (e.g., abdomen pain, vomiting, dizziness, fever)      Tired    10. PREGNANCY: \"Is there any chance you are pregnant?\" \"When was your last menstrual period?\"        N/A    Protocols used: Rectal Bleeding-ADULT-AH    "

## 2025-04-30 NOTE — ED NOTES
"Nursing report ED to floor  Ramirez Pena  59 y.o.  male    HPI :  HPI  Stated Reason for Visit: PT states he began having diarrhea early this morning and then turned to bloody stool. PT states it has been bright red and dark almost black at times.  History Obtained From: patient    Chief Complaint  Chief Complaint   Patient presents with    Black or Bloody Stool       Admitting doctor:   Ulises Bhardwaj MD    Admitting diagnosis:   The primary encounter diagnosis was BRBPR (bright red blood per rectum). A diagnosis of Diarrhea, unspecified type was also pertinent to this visit.    Code status:   Current Code Status       Date Active Code Status Order ID Comments User Context       4/30/2025 1934 CPR (Attempt to Resuscitate) [id number]  Juliane Hare APRN ED        Question Answer    Code Status (Patient has no pulse and is not breathing) CPR (Attempt to Resuscitate)    Medical Interventions (Patient has pulse or is breathing) Full Support                    Allergies:   Patient has no known allergies.    Isolation:   No active isolations    Intake and Output  No intake or output data in the 24 hours ending 04/30/25 1943    Weight:       04/30/25  1551   Weight: 98 kg (216 lb 0.8 oz)       Most recent vitals:   Vitals:    04/30/25 1550 04/30/25 1551 04/30/25 1701 04/30/25 1731   BP:  138/88 136/83 136/86   BP Location:  Right arm     Patient Position:  Sitting     Pulse: 85  70 68   Resp: 14  18 18   Temp: 97.9 °F (36.6 °C)      TempSrc: Tympanic      SpO2: 96%  96% 98%   Weight:  98 kg (216 lb 0.8 oz)     Height:  175.3 cm (69.02\")         Active LDAs/IV Access:   Lines, Drains & Airways       Active LDAs       Name Placement date Placement time Site Days    Peripheral IV 04/30/25 1613 20 G Posterior;Right Hand 04/30/25  1613  Hand  less than 1                    Labs (abnormal labs have a star):   Labs Reviewed   COMPREHENSIVE METABOLIC PANEL - Abnormal; Notable for the following components:       Result " Value    Glucose 105 (*)     Sodium 127 (*)     Chloride 96 (*)     CO2 21.0 (*)     All other components within normal limits    Narrative:     GFR Categories in Chronic Kidney Disease (CKD)      GFR Category          GFR (mL/min/1.73)    Interpretation  G1                     90 or greater         Normal or high (1)  G2                      60-89                Mild decrease (1)  G3a                   45-59                Mild to moderate decrease  G3b                   30-44                Moderate to severe decrease  G4                    15-29                Severe decrease  G5                    14 or less           Kidney failure          (1)In the absence of evidence of kidney disease, neither GFR category G1 or G2 fulfill the criteria for CKD.    eGFR calculation 2021 CKD-EPI creatinine equation, which does not include race as a factor   CBC WITH AUTO DIFFERENTIAL - Abnormal; Notable for the following components:    MCHC 35.8 (*)     RDW 11.9 (*)     Monocyte % 13.4 (*)     Monocytes, Absolute 0.91 (*)     All other components within normal limits   GASTROINTESTINAL PANEL, PCR (PREFERRED) DOES NOT INCLUDE CDIFF   CLOSTRIDIOIDES DIFFICILE TOXIN    Narrative:     The following orders were created for panel order Clostridioides difficile Toxin - Stool, Per Rectum.  Procedure                               Abnormality         Status                     ---------                               -----------         ------                     Clostridioides difficile...[[id number]]                                                   Please view results for these tests on the individual orders.   CLOSTRIDIOIDES DIFFICILE TOXIN, PCR   RAINBOW DRAW    Narrative:     The following orders were created for panel order [place] Draw.  Procedure                               Abnormality         Status                     ---------                               -----------         ------                     Green Top  (Gel)[[id number]]                                  Final result               Lavender Top[[id number]]                                     Final result               Gold Top - SST[[id number]]                                                              Light Blue Top[[id number]]                                   Final result                 Please view results for these tests on the individual orders.   HEMOGLOBIN AND HEMATOCRIT, BLOOD   POCT GLUCOSE FINGERSTICK   POCT GLUCOSE FINGERSTICK   POCT GLUCOSE FINGERSTICK   POCT GLUCOSE FINGERSTICK   CBC AND DIFFERENTIAL    Narrative:     The following orders were created for panel order CBC & Differential.  Procedure                               Abnormality         Status                     ---------                               -----------         ------                     CBC Auto Differential[[id number]]        Abnormal            Final result                 Please view results for these tests on the individual orders.   GREEN TOP   LAVENDER TOP   LIGHT BLUE TOP   GOLD TOP - SST       EKG:   No orders to display       Meds given in ED:   Medications   sodium chloride 0.9 % flush 10 mL (has no administration in time range)   sodium chloride 0.9 % flush 10 mL (has no administration in time range)   sodium chloride 0.9 % infusion 40 mL (has no administration in time range)   ondansetron ODT (ZOFRAN-ODT) disintegrating tablet 4 mg (has no administration in time range)     Or   ondansetron (ZOFRAN) injection 4 mg (has no administration in time range)   nitroglycerin (NITROSTAT) SL tablet 0.4 mg (has no administration in time range)   Potassium Replacement - Follow Nurse / BPA Driven Protocol (has no administration in time range)   Magnesium Standard Dose Replacement - Follow Nurse / BPA Driven Protocol (has no administration in time range)   Phosphorus Replacement - Follow Nurse / BPA Driven Protocol (has no administration in time range)   Calcium Replacement - Follow  Nurse / BPA Driven Protocol (has no administration in time range)   dextrose (GLUTOSE) oral gel 15 g (has no administration in time range)   dextrose (D50W) (25 g/50 mL) IV injection 25 g (has no administration in time range)   glucagon (GLUCAGEN) injection 1 mg (has no administration in time range)   insulin lispro (HUMALOG/ADMELOG) injection 2-7 Units (has no administration in time range)       Imaging results:  No radiology results for the last day    Ambulatory status:   - independent    Social issues:   Social History     Socioeconomic History    Marital status:    Tobacco Use    Smoking status: Light Smoker     Types: Cigars    Smokeless tobacco: Never    Tobacco comments:     once a week   Vaping Use    Vaping status: Never Used   Substance and Sexual Activity    Alcohol use: Yes     Comment: occasional not every day or week.    Drug use: Never    Sexual activity: Yes     Partners: Male     Birth control/protection: Condom, Partner of same sex       Peripheral Neurovascular  Peripheral Neurovascular (Adult)  Peripheral Neurovascular WDL: WDL    Neuro Cognitive  Neuro Cognitive (Adult)  Cognitive/Neuro/Behavioral WDL: WDL    Learning  Learning Assessment  Learning Readiness and Ability: no barriers identified    Respiratory  Respiratory  Airway WDL: WDL  Respiratory WDL  Respiratory WDL: WDL    Abdominal Pain       Pain Assessments  Pain (Adult)  (0-10) Pain Rating: Rest: 0  (0-10) Pain Rating: Activity: 0    NIH Stroke Scale       Ryne Faith RN  04/30/25 19:43 EDT

## 2025-04-30 NOTE — ED PROVIDER NOTES
EMERGENCY DEPARTMENT ENCOUNTER  Room Number:  13/13  PCP: Carmina Awad APRN  Independent Historians: Patient      HPI:  Chief Complaint: had concerns including Black or Bloody Stool.     A complete HPI/ROS/PMH/PSH/SH/FH are unobtainable due to: None    Chronic or social conditions impacting patient care (Social Determinants of Health): None      Context: Ramirez Pena is a 59 y.o. male with a medical history of GERD, colon polyps, internal hemorrhoids, rectal fissures, who presents to the ED c/o acute diarrhea and bright red blood per rectum onset this morning.  He states the diarrhea onset around 1 AM.  He has had multiple episodes throughout the day.  He denies any fever, chills, nausea, abdominal pain.  He has had a colonoscopy with polyps and upper GI which showed mild reflux in the past.  States he has had internal hemorrhoids before and rectal fissures.        Review of prior external notes (non-ED) -and- Review of prior external test results outside of this encounter:  I reviewed colonoscopy report from 9/28/2021.  3 mm polyp ascending colon, 4 mm polyp descending colon.  Multiple diverticula.    External and internal hemorrhoids.    I reviewed EGD report from 9/28/2021.  Grade B reflux esophagitis.  Gastritis.    Prescription drug monitoring program review:         PAST MEDICAL HISTORY  Active Ambulatory Problems     Diagnosis Date Noted    Sore throat 03/20/2017    Rash lower extremities 03/20/2017    Postviral fatigue syndrome 03/20/2017    Congestion of respiratory tract 03/20/2017    Pansinusitis 03/20/2017    ROBYN on CPAP 10/02/2018    Amaurosis fugax of right eye 03/18/2020    History of TIA (transient ischemic attack) 03/18/2020    Allergies 03/18/2020    Carotid stenosis 03/19/2020    Class 1 obesity due to excess calories without serious comorbidity with body mass index (BMI) of 31.0 to 31.9 in adult 12/10/2020    History of colon polyps 06/16/2021    Encounter for screening for  malignant neoplasm of colon 06/16/2021    Family history of colon cancer 06/16/2021    Family history of esophageal cancer 06/16/2021    Left inguinal hernia 10/18/2021    Class 1 obesity 12/14/2023    Primary hypertension 10/19/2024     Resolved Ambulatory Problems     Diagnosis Date Noted    Congested 03/20/2017    Malaise 03/20/2017    Snoring 10/02/2018    Type 2 diabetes mellitus 03/19/2020     Past Medical History:   Diagnosis Date    Allergic 8/2018    Arthritis 10/23/2024    COVID 01/2022    Depression     Elevated cholesterol     Environmental allergies     Fissure, anal 1998    GERD (gastroesophageal reflux disease)     History of TIAs 2020    Hypertension     Inguinal hernia     Low back pain 10/1986    PONV (postoperative nausea and vomiting)     Rectal bleeding 1998    Seasonal allergies     Stroke          PAST SURGICAL HISTORY  Past Surgical History:   Procedure Laterality Date    APPENDECTOMY Bilateral 2005    COLONOSCOPY N/A 09/28/2021    Procedure: COLONOSCOPY FOR SCREENING;  Surgeon: Nato Vu MD;  Location: [facility] MAIN OR;  Service: Gastroenterology;  Laterality: N/A;  hemorrhoids , diverticulosis and polyps    CYST REMOVAL      ON HEAD    ENDOSCOPY N/A 09/28/2021    Procedure: ESOPHAGOGASTRODUODENOSCOPY;  Surgeon: Nato Vu MD;  Location: [facility] MAIN OR;  Service: Gastroenterology;  Laterality: N/A;  Esophagitis and Gastritis    HERNIA REPAIR  2023    INGUINAL HERNIA REPAIR Left 12/07/2021    Procedure: left INGUINAL HERNIA REPAIR LAPAROSCOPIC WITH DAVINCI ROBOT;  Surgeon: Luis Fernando Son MD;  Location: [facility] MAIN OR;  Service: Robotics - DaVinci;  Laterality: Left;    KNEE ACL RECONSTRUCTION Right 2011         FAMILY HISTORY  Family History   Problem Relation Age of Onset    Cancer Mother         Bladder Cancer    Heart disease Mother     Hypertension Mother     Stroke Mother     Esophageal cancer Father         Passed away from this.    Cancer Father         Esophagus  Cancer    Uterine cancer Paternal Grandmother     Cancer Paternal Grandmother         Uterus Cancer    Dementia Paternal Grandmother     Colon cancer Paternal Grandfather         Passed away from this    Prostate cancer Paternal Grandfather     Cancer Paternal Grandfather         Prostate Cancer    Stomach cancer Maternal Grandfather         Not sure if it was stomach cancer     Stroke Maternal Grandmother     Colon polyps Neg Hx     Crohn's disease Neg Hx     Irritable bowel syndrome Neg Hx     Ulcerative colitis Neg Hx     Malig Hyperthermia Neg Hx          SOCIAL HISTORY  Social History     Socioeconomic History    Marital status:    Tobacco Use    Smoking status: Light Smoker     Types: Cigars    Smokeless tobacco: Never    Tobacco comments:     once a week   Vaping Use    Vaping status: Never Used   Substance and Sexual Activity    Alcohol use: Yes     Comment: occasional not every day or week.    Drug use: Never    Sexual activity: Yes     Partners: Male     Birth control/protection: Condom, Partner of same sex       ALLERGIES  Patient has no known allergies.      PHYSICAL EXAM    I have reviewed the triage vital signs and nursing notes.    ED Triage Vitals   Temp Heart Rate Resp BP SpO2   03/02/25 1448 03/02/25 1448 03/02/25 1448 03/02/25 1450 03/02/25 1448   97.4 °F (36.3 °C) 95 16 141/84 97 %      Temp src Heart Rate Source Patient Position BP Location FiO2 (%)   -- -- -- -- --              Physical Exam  GENERAL: alert, no acute distress  SKIN: Warm, dry  HENT: Normocephalic, atraumatic  EYES: no scleral icterus  CV: regular rhythm, regular rate  RESPIRATORY: normal effort, lungs clear  ABDOMEN: soft, nondistended, nontender  Patient with bright red blood surrounding rectum, no external hemorrhoids or fissures noted, no tenderness to palpation  MUSCULOSKELETAL: no deformity  NEURO: alert, moves all extremities, follows commands        LAB RESULTS  Recent Results (from the past 24 hours)    Comprehensive Metabolic Panel    Collection Time: 04/30/25  4:12 PM    Specimen: Blood   Result Value Ref Range    Glucose 105 (H) 65 - 99 mg/dL    BUN 7 6 - 20 mg/dL    Creatinine 0.78 0.76 - 1.27 mg/dL    Sodium 127 (L) 136 - 145 mmol/L    Potassium 3.8 3.5 - 5.2 mmol/L    Chloride 96 (L) 98 - 107 mmol/L    CO2 21.0 (L) 22.0 - 29.0 mmol/L    Calcium 8.7 8.6 - 10.5 mg/dL    Total Protein 6.5 6.0 - 8.5 g/dL    Albumin 4.4 3.5 - 5.2 g/dL    ALT (SGPT) 23 1 - 41 U/L    AST (SGOT) 20 1 - 40 U/L    Alkaline Phosphatase 62 39 - 117 U/L    Total Bilirubin 0.7 0.0 - 1.2 mg/dL    Globulin 2.1 gm/dL    A/G Ratio 2.1 g/dL    BUN/Creatinine Ratio 9.0 7.0 - 25.0    Anion Gap 10.0 5.0 - 15.0 mmol/L    eGFR 102.7 >60.0 mL/min/1.73   CBC Auto Differential    Collection Time: 04/30/25  4:12 PM    Specimen: Blood   Result Value Ref Range    WBC 6.80 3.40 - 10.80 10*3/mm3    RBC 4.30 4.14 - 5.80 10*6/mm3    Hemoglobin 14.2 13.0 - 17.7 g/dL    Hematocrit 39.7 37.5 - 51.0 %    MCV 92.3 79.0 - 97.0 fL    MCH 33.0 26.6 - 33.0 pg    MCHC 35.8 (H) 31.5 - 35.7 g/dL    RDW 11.9 (L) 12.3 - 15.4 %    RDW-SD 40.2 37.0 - 54.0 fl    MPV 9.2 6.0 - 12.0 fL    Platelets 184 140 - 450 10*3/mm3    Neutrophil % 59.5 42.7 - 76.0 %    Lymphocyte % 22.1 19.6 - 45.3 %    Monocyte % 13.4 (H) 5.0 - 12.0 %    Eosinophil % 4.0 0.3 - 6.2 %    Basophil % 0.7 0.0 - 1.5 %    Immature Grans % 0.3 0.0 - 0.5 %    Neutrophils, Absolute 4.05 1.70 - 7.00 10*3/mm3    Lymphocytes, Absolute 1.50 0.70 - 3.10 10*3/mm3    Monocytes, Absolute 0.91 (H) 0.10 - 0.90 10*3/mm3    Eosinophils, Absolute 0.27 0.00 - 0.40 10*3/mm3    Basophils, Absolute 0.05 0.00 - 0.20 10*3/mm3    Immature Grans, Absolute 0.02 0.00 - 0.05 10*3/mm3    nRBC 0.0 0.0 - 0.2 /100 WBC   Green Top (Gel)    Collection Time: 04/30/25  4:12 PM   Result Value Ref Range    Extra Tube Hold for add-ons.    Lavender Top    Collection Time: 04/30/25  4:12 PM   Result Value Ref Range    Extra Tube hold for add-on     Light Blue Top    Collection Time: 04/30/25  4:12 PM   Result Value Ref Range    Extra Tube Hold for add-ons.        RADIOLOGY  No Radiology Exams Resulted Within Past 24 Hours      MEDICATIONS GIVEN IN ER  Medications - No data to display      ORDERS PLACED DURING THIS VISIT:  Orders Placed This Encounter   Procedures    Gastrointestinal Panel, PCR - Stool, Per Rectum    Clostridioides difficile Toxin - Stool, Per Rectum    Clostridioides difficile Toxin, PCR - Stool, Per Rectum    Comprehensive Metabolic Panel    [place] Draw    CBC Auto Differential    NPO Diet NPO Type: Strict NPO    Gastroenterology (on-call MD unless specified)    Inpatient Gastroenterology Consult    Patient Isolation Contact Spore    Initiate Emergency Department Observation Status    CBC & Differential    Green Top (Gel)    Lavender Top    Gold Top - SST    Light Blue Top         OUTPATIENT MEDICATION MANAGEMENT:  No current Epic-ordered facility-administered medications on file.     Current Outpatient Medications Ordered in Epic   Medication Sig Dispense Refill    atorvastatin (LIPITOR) 80 MG tablet Take 1 tablet by mouth Every Night. 90 tablet 3    azelastine (ASTELIN) 0.1 % nasal spray Instill 2 sprays into the nostril(s) as directed by provider 2 (Two) Times a Day. 90 mL 3    clopidogrel (PLAVIX) 75 MG tablet Take 1 tablet by mouth Daily. 90 tablet 1    EPINEPHrine (EPIPEN) 0.3 MG/0.3ML solution auto-injector injection Use as directed. 2 each 3    fluticasone (FLONASE) 50 MCG/ACT nasal spray Administer 2 sprays into the nostril(s) as directed by provider Daily.      lisinopril-hydrochlorothiazide (PRINZIDE,ZESTORETIC) 10-12.5 MG per tablet Take 1 tablet by mouth Daily. 90 tablet 1    montelukast (SINGULAIR) 10 MG tablet Take 1 tablet by mouth Daily. 90 tablet 3    multivitamin with minerals tablet tablet Take 1 tablet by mouth Daily. (Patient not taking: Reported on 4/8/2025)      Naproxen Sodium (ALEVE PO) Take 2 capsules by mouth As  Needed.      pantoprazole (Protonix) 20 MG EC tablet Take 1 tablet by mouth Daily. 90 tablet 3    terbinafine (lamiSIL) 250 MG tablet Take 1 tablet by mouth Daily. Per provider: patient needs to keep appointment on 02/02/25. 90 tablet 0    Tirzepatide 2.5 MG/0.5ML solution auto-injector Inject 2.5 mg under the skin into the appropriate area as directed 1 (One) Time Per Week. 2 mL 0    Tirzepatide 5 MG/0.5ML solution auto-injector Inject 5 mg under the skin into the appropriate area as directed 1 (One) Time Per Week. 6 mL 0    triamcinolone (KENALOG) 0.1 % cream Apply topically to the affected areas of upper extremities 2 (Two) Times a Day for 2-3 weeks as needed for flares. 80 g 5         PROCEDURES  Procedures            PROGRESS, DATA ANALYSIS, CONSULTS, AND MEDICAL DECISION MAKING  All labs have been independently interpreted by me.  All radiology studies have been reviewed by me. All EKG's have been independently viewed and interpreted by me.  Discussion below represents my analysis of pertinent findings related to patient's condition, differential diagnosis, treatment plan and final disposition.    Differential diagnosis includes but is not limited to GI bleed, external or internal hemorrhoids, rectal fissures, diverticulosis, invasive diarrheal disease, anemia.    Clinical Scores:                                       ED Course as of 04/30/25 1932 Wed Apr 30, 2025   1554 Patient presents to the ED for evaluation of diarrhea and bright red blood per rectum onset this morning.  He states the diarrhea onset around 1 AM.  He has had multiple episodes throughout the day.  He denies any fever, chills, nausea, abdominal pain.  He has had a colonoscopy with polyps and upper GI which showed mild reflux in the past.  States he has had internal hemorrhoids before and rectal fissures.    On exam patient is well-appearing.  Not tachycardic, abdomen soft nontender.  Patient has bright red blood on skin surrounding rectum,  no external fissures.    Will obtain basic labs and observe.  He is not hypertensive or tachycardic.  Plan for JIMMY after patient has bowel movement here.  Discussed likely need for outpatient colonoscopy.  Patient expressed understanding and is agreeable [DN]   1638 Hemoglobin: 14.2  Slightly decreased from 15.1 on labs 3 weeks ago [DN]   1704 BUN: 7 [DN]   1704 Creatinine: 0.78 [DN]   1849 Patient without any large diarrhea, but reports consistent leaking BRBPR.  JIMMY without any large internal hemorrhoids palpated, bright red blood noted.  Discussed reassuring blood pressure, heart rate, and hemoglobin with patient.  He is concerned due to ongoing bleeding.  Discussed admission for observation and GI consult.  They expressed understanding and are agreeable [DN]   1857 I discussed patient with Dr. Mcguire.  Request C. difficile and enteric PCR tests.  N.p.o. at midnight.  Possible flex sig tomorrow pending evaluation by gastroenterologist then [DN]   1932 I discussed patient with on-call KAITLIN for observation unit, agreeable with plan to admit [DN]      ED Course User Index  [DN] Blaise Gallagher MD             AS OF 19:32 EDT VITALS:    BP - 136/86  HR - 68  TEMP - 97.9 °F (36.6 °C) (Tympanic)  O2 SATS - 98%    COMPLEXITY OF CARE  The patient requires admission.      DIAGNOSIS  Final diagnoses:   BRBPR (bright red blood per rectum)   Diarrhea, unspecified type         DISPOSITION  ED Disposition       ED Disposition   Decision to Admit    Condition   --    Comment   --                    Please note that portions of this document were completed with a voice recognition program.    Note Disclaimer: At [facility], we believe that sharing information builds trust and better relationships. You are receiving this note because you recently visited [facility]. It is possible you will see health information before a provider has talked with you about it. This kind of information can be easy to misunderstand. To help you  fully understand what it means for your health, we urge you to discuss this note with your provider.         Blaise Gallagher MD  04/30/25 1619       Blaise Gallagher MD  04/30/25 8632

## 2025-04-30 NOTE — H&P
[facility]   HISTORY AND PHYSICAL    Patient Name: Ramirez Pena  : 1965  MRN: [id number]  Primary Care Physician:  Carmina Awad APRN  Date of admission: 2025    Patient Care Team:  Carmina Awad APRN as PCP - General (Family Medicine)  Nato Vu MD as Consulting Physician (Gastroenterology)  Mely Winkler APRN as Nurse Practitioner (Nurse Practitioner)       Subjective   Subjective     Chief Complaint:   Chief Complaint   Patient presents with    Black or Bloody Stool         HPI:    Ramirez Pena is a 59 y.o. male, with a past medical history including, but not limited to, CVA, type 2 diabetes, depression, carotid stenosis, hemorrhoids, rectal fissures, and hyperlipidemia, was admitted to the observation unit with a complaint of BRBPR.  He states that he has been having issues with constipation while on Ozempic.  As he will not have a bowel movement for several days and then have multiple bowel movements over a day and then return to being constipated.  Yesterday he started having frequent bowel movements that continued through the night.  This noon he noticed that there was bright red blood and blood clots on the toilet paper after he wiped and states that there was dark red blood as well as bright red blood in the toilet.  He denies any nausea, vomiting, abdominal pain or cramping, no recent antibiotic usage, fever, or chills.  GI has been consulted to see the patient in the AM.  Patient remain on clear liquids and then n.p.o. at midnight.  We will monitor his H&H every 6 hours.    Review of Systems   All systems were reviewed and negative except for: What was mentioned above in the HPI.    Personal History     Past Medical History:   Diagnosis Date    Allergic 2018    Amaurosis fugax of right eye 2020    Arthritis 10/23/2024    In big toes.    Carotid stenosis 2020    Class 1 obesity due to excess calories without serious comorbidity  with body mass index (BMI) of 31.0 to 31.9 in adult 12/10/2020    COVID 01/2022    Depression     Elevated cholesterol     Environmental allergies     Fissure, anal 1998    GERD (gastroesophageal reflux disease)     History of TIAs 2020    Hypertension     NO MEDS    Inguinal hernia     Low back pain 10/1986    ROBYN on CPAP     AHI 12.5    PONV (postoperative nausea and vomiting)     Rectal bleeding 1998    Seasonal allergies     Stroke     Type 2 diabetes mellitus 03/19/2020       Past Surgical History:   Procedure Laterality Date    APPENDECTOMY Bilateral 2005    COLONOSCOPY N/A 09/28/2021    Procedure: COLONOSCOPY FOR SCREENING;  Surgeon: Nato Vu MD;  Location: [facility] [place] MAIN OR;  Service: Gastroenterology;  Laterality: N/A;  hemorrhoids , diverticulosis and polyps    CYST REMOVAL      ON HEAD    ENDOSCOPY N/A 09/28/2021    Procedure: ESOPHAGOGASTRODUODENOSCOPY;  Surgeon: Nato Vu MD;  Location: [facility] [place] MAIN OR;  Service: Gastroenterology;  Laterality: N/A;  Esophagitis and Gastritis    HERNIA REPAIR  2023    INGUINAL HERNIA REPAIR Left 12/07/2021    Procedure: left INGUINAL HERNIA REPAIR LAPAROSCOPIC WITH DAVINCI ROBOT;  Surgeon: Luis Fernando Son MD;  [facility]: [facility] MAIN OR;  Service: Robotics - DaVinci;  Laterality: Left;    KNEE ACL RECONSTRUCTION Right 2011       Family History: family history includes Cancer in his father, mother, paternal grandfather, and paternal grandmother; Colon cancer in his paternal grandfather; Dementia in his paternal grandmother; Esophageal cancer in his father; Heart disease in his mother; Hypertension in his mother; Prostate cancer in his paternal grandfather; Stomach cancer in his maternal grandfather; Stroke in his maternal grandmother and mother; Uterine cancer in his paternal grandmother. Otherwise pertinent FHx was reviewed and not pertinent to current issue.    Social History:  reports that he has been smoking cigars. He has never used smokeless tobacco.  He reports current alcohol use. He reports that he does not use drugs.    Home Medications:  Azelastine HCl, EPINEPHrine, Naproxen Sodium, Tirzepatide, atorvastatin, clopidogrel, fluticasone, lisinopril-hydrochlorothiazide, montelukast, multivitamin with minerals, pantoprazole, terbinafine, and triamcinolone    Allergies:  No Known Allergies    Objective   Objective     Vitals:   Temp:  [97.9 °F (36.6 °C)] 97.9 °F (36.6 °C)  Heart Rate:  [68-85] 68  Resp:  [14-18] 18  BP: (136-138)/(83-88) 136/86  Physical Exam    Constitutional: Awake, alert   Eyes: PERRLA, sclerae anicteric, no conjunctival injection   HENT: NCAT, mucous membranes moist   Neck: Supple, no thyromegaly, no lymphadenopathy, trachea midline   Respiratory: Clear to auscultation bilaterally, nonlabored respirations    Cardiovascular: RRR, no murmurs, rubs, or gallops, palpable pedal pulses bilaterally   Gastrointestinal: Positive bowel sounds, soft, nontender, nondistended   Musculoskeletal: No bilateral ankle edema, no clubbing or cyanosis to extremities   Psychiatric: Appropriate affect, cooperative   Neurologic: Oriented x 3, strength symmetric in all extremities, Cranial Nerves grossly intact to confrontation, speech clear   Skin: No rashes     Result Review    Result Review:  I have personally reviewed the results from the time of this admission to 4/30/2025 19:36 EDT and agree with these findings:  [x]  Laboratory list / accordion  []  Microbiology  []  Radiology  []  EKG/Telemetry   []  Cardiology/Vascular   []  Pathology  [x]  Old records  []  Other:    Initial lab work in the emergency department shows a sodium of 127, glucose 105, hemoglobin 14.2, hematocrit 39.7.  All of the lab work is baseline for the patient.    The ASCVD Risk score (Jordin LINTON, et al., 2019) failed to calculate for the following reasons:    Risk score cannot be calculated because patient has a medical history suggesting prior/existing ASCVD     Assessment & Plan    Assessment / Plan     Brief Patient Summary:  Ramirez Pena is a 59 y.o. male who was admitted to the observation unit for further evaluation and treatment of his BRBPR.  He will remain on clear liquid diet and n.p.o. at midnight.  GI has been consulted to see the patient in the AM.    Active Hospital Problems:  Active Hospital Problems    Diagnosis     **BRBPR (bright red blood per rectum)      Plan:     BRBPR  -Cardiac monitor  - Vital signs every 4 hours  - Continue pulse ox  -Hemoglobin 14.2 in ED, was 15.1 on 4/8/2025  - H&H every 6 hours  - GI consult in a.m.  - Clear liquid diet, n.p.o. after midnight  - Hold Plavix overnight  - Gastrointestinal panel PCR/C. difficile toxin-pending    Diabetes  -Accu-Cheks AC&HS  -Adult subcutaneous insulin management-low dose  -Hemoglobin A1c-5.8 on 4/8/2025      VTE Prophylaxis:  Mechanical VTE prophylaxis orders are present.        CODE STATUS:    Code Status (Patient has no pulse and is not breathing): CPR (Attempt to Resuscitate)  Medical Interventions (Patient has pulse or is breathing): Full Support    Admission Status:  I believe this patient meets observation status.    76 minutes have been spent by [facility] providers in the care of this patient while under observation status on 04/30/25 .      Appropriate PPE worn during patient encounter.  Hand hygeine performed before and after seeing the patient.      Electronically signed by IVETTE Antoine, 04/30/25, 7:36 PM EDT.

## 2025-05-01 DIAGNOSIS — E11.9 TYPE 2 DIABETES MELLITUS WITHOUT COMPLICATION, WITHOUT LONG-TERM CURRENT USE OF INSULIN: ICD-10-CM

## 2025-05-01 LAB
ANION GAP SERPL CALCULATED.3IONS-SCNC: 10.7 MMOL/L (ref 5–15)
BUN SERPL-MCNC: 6 MG/DL (ref 6–20)
BUN/CREAT SERPL: 8.3 (ref 7–25)
CALCIUM SPEC-SCNC: 8.3 MG/DL (ref 8.6–10.5)
CHLORIDE SERPL-SCNC: 97 MMOL/L (ref 98–107)
CO2 SERPL-SCNC: 21.3 MMOL/L (ref 22–29)
CREAT SERPL-MCNC: 0.72 MG/DL (ref 0.76–1.27)
CRP SERPL-MCNC: 0.35 MG/DL (ref 0–0.5)
CRP SERPL-MCNC: 0.43 MG/DL (ref 0–0.5)
DEPRECATED RDW RBC AUTO: 38.4 FL (ref 37–54)
EGFRCR SERPLBLD CKD-EPI 2021: 105.2 ML/MIN/1.73
ERYTHROCYTE [DISTWIDTH] IN BLOOD BY AUTOMATED COUNT: 11.7 % (ref 12.3–15.4)
GLUCOSE BLDC GLUCOMTR-MCNC: 88 MG/DL (ref 70–130)
GLUCOSE BLDC GLUCOMTR-MCNC: 89 MG/DL (ref 70–130)
GLUCOSE BLDC GLUCOMTR-MCNC: 93 MG/DL (ref 70–130)
GLUCOSE SERPL-MCNC: 84 MG/DL (ref 65–99)
HCT VFR BLD AUTO: 37.8 % (ref 37.5–51)
HCT VFR BLD AUTO: 38 % (ref 37.5–51)
HCT VFR BLD AUTO: 38 % (ref 37.5–51)
HCT VFR BLD AUTO: 39.6 % (ref 37.5–51)
HCT VFR BLD AUTO: 40.6 % (ref 37.5–51)
HGB BLD-MCNC: 13.5 G/DL (ref 13–17.7)
HGB BLD-MCNC: 13.5 G/DL (ref 13–17.7)
HGB BLD-MCNC: 13.6 G/DL (ref 13–17.7)
HGB BLD-MCNC: 14 G/DL (ref 13–17.7)
HGB BLD-MCNC: 14.1 G/DL (ref 13–17.7)
MCH RBC QN AUTO: 32.3 PG (ref 26.6–33)
MCHC RBC AUTO-ENTMCNC: 35.5 G/DL (ref 31.5–35.7)
MCV RBC AUTO: 90.9 FL (ref 79–97)
PLATELET # BLD AUTO: 182 10*3/MM3 (ref 140–450)
PMV BLD AUTO: 8.9 FL (ref 6–12)
POTASSIUM SERPL-SCNC: 4.1 MMOL/L (ref 3.5–5.2)
RBC # BLD AUTO: 4.18 10*6/MM3 (ref 4.14–5.8)
SODIUM SERPL-SCNC: 129 MMOL/L (ref 136–145)
WBC NRBC COR # BLD AUTO: 5.09 10*3/MM3 (ref 3.4–10.8)

## 2025-05-01 PROCEDURE — 99214 OFFICE O/P EST MOD 30 MIN: CPT

## 2025-05-01 PROCEDURE — 82948 REAGENT STRIP/BLOOD GLUCOSE: CPT

## 2025-05-01 PROCEDURE — 85014 HEMATOCRIT: CPT

## 2025-05-01 PROCEDURE — 85018 HEMOGLOBIN: CPT

## 2025-05-01 PROCEDURE — G0378 HOSPITAL OBSERVATION PER HR: HCPCS

## 2025-05-01 PROCEDURE — 36415 COLL VENOUS BLD VENIPUNCTURE: CPT

## 2025-05-01 PROCEDURE — 80048 BASIC METABOLIC PNL TOTAL CA: CPT

## 2025-05-01 PROCEDURE — 85027 COMPLETE CBC AUTOMATED: CPT

## 2025-05-01 PROCEDURE — 86140 C-REACTIVE PROTEIN: CPT | Performed by: EMERGENCY MEDICINE

## 2025-05-01 RX ORDER — PANTOPRAZOLE SODIUM 40 MG/1
40 TABLET, DELAYED RELEASE ORAL NIGHTLY
Status: DISCONTINUED | OUTPATIENT
Start: 2025-05-01 | End: 2025-05-02 | Stop reason: HOSPADM

## 2025-05-01 RX ORDER — HYDROCHLOROTHIAZIDE 12.5 MG/1
12.5 TABLET ORAL NIGHTLY
Status: DISCONTINUED | OUTPATIENT
Start: 2025-05-01 | End: 2025-05-02 | Stop reason: HOSPADM

## 2025-05-01 RX ORDER — LISINOPRIL 10 MG/1
10 TABLET ORAL NIGHTLY
Status: DISCONTINUED | OUTPATIENT
Start: 2025-05-01 | End: 2025-05-02 | Stop reason: HOSPADM

## 2025-05-01 RX ORDER — SODIUM CHLORIDE 9 MG/ML
100 INJECTION, SOLUTION INTRAVENOUS CONTINUOUS
Status: DISCONTINUED | OUTPATIENT
Start: 2025-05-01 | End: 2025-05-02 | Stop reason: HOSPADM

## 2025-05-01 RX ORDER — LISINOPRIL 10 MG/1
10 TABLET ORAL NIGHTLY
Status: DISCONTINUED | OUTPATIENT
Start: 2025-05-01 | End: 2025-05-01

## 2025-05-01 RX ORDER — MONTELUKAST SODIUM 10 MG/1
10 TABLET ORAL NIGHTLY
Status: DISCONTINUED | OUTPATIENT
Start: 2025-05-01 | End: 2025-05-02 | Stop reason: HOSPADM

## 2025-05-01 RX ORDER — POLYETHYLENE GLYCOL 3350 17 G/17G
85 POWDER, FOR SOLUTION ORAL ONCE
Status: COMPLETED | OUTPATIENT
Start: 2025-05-01 | End: 2025-05-01

## 2025-05-01 RX ORDER — HYDROCHLOROTHIAZIDE 12.5 MG/1
12.5 TABLET ORAL
Status: DISCONTINUED | OUTPATIENT
Start: 2025-05-02 | End: 2025-05-01

## 2025-05-01 RX ADMIN — LISINOPRIL 10 MG: 10 TABLET ORAL at 20:43

## 2025-05-01 RX ADMIN — ATORVASTATIN CALCIUM 80 MG: 80 TABLET, FILM COATED ORAL at 20:43

## 2025-05-01 RX ADMIN — HYDROCHLOROTHIAZIDE 12.5 MG: 12.5 TABLET ORAL at 20:43

## 2025-05-01 RX ADMIN — POLYETHYLENE GLYCOL 3350 85 G: 17 POWDER, FOR SOLUTION ORAL at 11:44

## 2025-05-01 RX ADMIN — Medication 10 ML: at 08:19

## 2025-05-01 RX ADMIN — PANTOPRAZOLE SODIUM 40 MG: 40 TABLET, DELAYED RELEASE ORAL at 00:09

## 2025-05-01 RX ADMIN — HYDROCHLOROTHIAZIDE 12.5 MG: 12.5 TABLET ORAL at 08:19

## 2025-05-01 RX ADMIN — PANTOPRAZOLE SODIUM 40 MG: 40 TABLET, DELAYED RELEASE ORAL at 20:43

## 2025-05-01 RX ADMIN — MONTELUKAST 10 MG: 10 TABLET, FILM COATED ORAL at 20:43

## 2025-05-01 RX ADMIN — Medication 10 ML: at 20:44

## 2025-05-01 RX ADMIN — LISINOPRIL 10 MG: 10 TABLET ORAL at 00:09

## 2025-05-01 RX ADMIN — Medication 10 ML: at 00:09

## 2025-05-01 NOTE — CONSULTS
Gastroenterology   Initial Inpatient Consult Note    Referring Provider: Dr Hernandez    Reason for Consultation: BRBPR    Subjective     History of present illness:    59 y.o. male with history of CVA, diabetes, carotid stenosis, hemorrhoids, anal fissures, appendectomy, hernia repair presents to the hospital with rectal bleeding.  Patient reports he has had on and off rectal bleeding for a while which he has attributed to anal fissures which he has experienced issues with since the 1990s.  He denies any current rectal pain.  However, yesterday he had a significantly larger amount of bleeding compared to typical.  He had 3-4 bowel movements that are all contained dark and bright red blood.  He also reports seeing clots.  He does state that he is typically constipated especially with the use of Ozempic.  He is planning on changing this to Mounjaro soon.  The 2 to 3 days following an injection is when this is at its worst.  Can skip 5 days at most between bowel movements and then will have a day of diarrhea.  He is not currently taking anything as a bowel regimen.  He used to take Dulcolax which was not helpful.  He has tried MiraLAX as well and never felt completely evacuated.  He denies any current abdominal pain.  No nausea or vomiting.  Did have chills 2 days ago, but no fever.  He reports gas and feels that he needs to have a bowel movement.  He reports no changes in the dosage of his Ozempic.  He takes pantoprazole which controls reflux well.  He does take naproxen as needed.  He has not had a bowel movement since arrival to the hospital.    Family history of colon cancer in his paternal grandfather.  No family history of IBD that he is aware of.    Endo hx  COLONOSCOPY (09/28/2021 08:23) - colon polyps, multiple small and large mouth diverticula, internal/external hemorrhoids-recall 5 years  UPPER GI ENDOSCOPY (09/28/2021 08:23)-grade B esophagitis, gastritis  Tissue Pathology Exam (09/28/2021 08:33)-GE  junction with reflux esophagitis and repair, negative for dysplasia, TA/HP colon polyps      Results Reviewed:  Hemoglobin & Hematocrit, Blood (05/01/2025 04:11)  Hgb 13.6    Clostridioides difficile Toxin, PCR - Stool, Per Rectum (04/30/2025 18:57)-pending  Gastrointestinal Panel, PCR - Stool, Per Rectum (04/30/2025 18:57)-pending    Comprehensive Metabolic Panel (04/30/2025 16:12)  Creatinine 0.78, , normal LFTs      Past Medical History:  Past Medical History:   Diagnosis Date    Allergic 8/2018    Amaurosis fugax of right eye 03/18/2020    Arthritis 10/23/2024    In big toes.    Carotid stenosis 03/19/2020    Class 1 obesity due to excess calories without serious comorbidity with body mass index (BMI) of 31.0 to 31.9 in adult 12/10/2020    COVID 01/2022    Depression     Elevated cholesterol     Environmental allergies     Fissure, anal 1998    GERD (gastroesophageal reflux disease)     History of TIAs 2020    Hypertension     NO MEDS    Inguinal hernia     Low back pain 10/1986    ROBYN on CPAP     AHI 12.5    PONV (postoperative nausea and vomiting)     Rectal bleeding 1998    Seasonal allergies     Stroke     Type 2 diabetes mellitus 03/19/2020     Past Surgical History:  Past Surgical History:   Procedure Laterality Date    APPENDECTOMY Bilateral 2005    COLONOSCOPY N/A 09/28/2021    Procedure: COLONOSCOPY FOR SCREENING;  Surgeon: Nato Vu MD;  Location: [facility] MAIN OR;  Service: Gastroenterology;  Laterality: N/A;  hemorrhoids , diverticulosis and polyps    CYST REMOVAL      ON HEAD    ENDOSCOPY N/A 09/28/2021    Procedure: ESOPHAGOGASTRODUODENOSCOPY;  Surgeon: Nato Vu MD;  Location: [facility] MAIN OR;  Service: Gastroenterology;  Laterality: N/A;  Esophagitis and Gastritis    HERNIA REPAIR  2023    INGUINAL HERNIA REPAIR Left 12/07/2021    Procedure: left INGUINAL HERNIA REPAIR LAPAROSCOPIC WITH DAVINCI ROBOT;  Surgeon: Luis Fernando Son MD;  Location: [facility] MAIN OR;  Service:  Robotics - Jaretti;  Laterality: Left;    KNEE ACL RECONSTRUCTION Right 2011      Social History:   Social History     Tobacco Use    Smoking status: Light Smoker     Types: Cigars    Smokeless tobacco: Never    Tobacco comments:     once a week   Substance Use Topics    Alcohol use: Yes     Comment: occasional not every day or week.      Family History:  Family History   Problem Relation Age of Onset    Cancer Mother         Bladder Cancer    Heart disease Mother     Hypertension Mother     Stroke Mother     Esophageal cancer Father         Passed away from this.    Cancer Father         Esophagus Cancer    Uterine cancer Paternal Grandmother     Cancer Paternal Grandmother         Uterus Cancer    Dementia Paternal Grandmother     Colon cancer Paternal Grandfather         Passed away from this    Prostate cancer Paternal Grandfather     Cancer Paternal Grandfather         Prostate Cancer    Stomach cancer Maternal Grandfather         Not sure if it was stomach cancer     Stroke Maternal Grandmother     Colon polyps Neg Hx     Crohn's disease Neg Hx     Irritable bowel syndrome Neg Hx     Ulcerative colitis Neg Hx     Malig Hyperthermia Neg Hx        Home Meds:  Medications Prior to Admission   Medication Sig Dispense Refill Last Dose/Taking    atorvastatin (LIPITOR) 80 MG tablet Take 1 tablet by mouth Every Night. 90 tablet 3 4/29/2025 Evening    azelastine (ASTELIN) 0.1 % nasal spray Instill 2 sprays into the nostril(s) as directed by provider 2 (Two) Times a Day. 90 mL 3 4/29/2025 Evening    clopidogrel (PLAVIX) 75 MG tablet Take 1 tablet by mouth Daily. 90 tablet 1 4/30/2025 Morning    fluticasone (FLONASE) 50 MCG/ACT nasal spray Administer 2 sprays into the nostril(s) as directed by provider Daily.   4/30/2025 Morning    lisinopril-hydrochlorothiazide (PRINZIDE,ZESTORETIC) 10-12.5 MG per tablet Take 1 tablet by mouth Daily. 90 tablet 1 4/29/2025 Evening    montelukast (SINGULAIR) 10 MG tablet Take 1 tablet by  mouth Daily. 90 tablet 3 4/29/2025 Evening    Naproxen Sodium (ALEVE PO) Take 2 capsules by mouth As Needed.   4/30/2025 Morning    pantoprazole (Protonix) 20 MG EC tablet Take 1 tablet by mouth Daily. 90 tablet 3 4/29/2025 Evening    Semaglutide, 1 MG/DOSE, (Ozempic, 1 MG/DOSE,) 4 MG/3ML solution pen-injector Inject  under the skin into the appropriate area as directed 1 (One) Time Per Week.   Past Week    terbinafine (lamiSIL) 250 MG tablet Take 1 tablet by mouth Daily. Per provider: patient needs to keep appointment on 02/02/25. 90 tablet 0 4/30/2025 Morning    Tirzepatide 2.5 MG/0.5ML solution auto-injector Inject 2.5 mg under the skin into the appropriate area as directed 1 (One) Time Per Week. 2 mL 0 Patient Taking Differently    Tirzepatide 5 MG/0.5ML solution auto-injector Inject 5 mg under the skin into the appropriate area as directed 1 (One) Time Per Week. 6 mL 0 Patient Taking Differently    triamcinolone (KENALOG) 0.1 % cream Apply topically to the affected areas of upper extremities 2 (Two) Times a Day for 2-3 weeks as needed for flares. 80 g 5 Past Week Morning    EPINEPHrine (EPIPEN) 0.3 MG/0.3ML solution auto-injector injection Use as directed. 2 each 3 Unknown     Current Meds:   atorvastatin, 80 mg, Oral, Nightly  [Held by provider] clopidogrel, 75 mg, Oral, Daily  lisinopril, 10 mg, Oral, Q24H   And  hydroCHLOROthiazide, 12.5 mg, Oral, Q24H  insulin lispro, 2-7 Units, Subcutaneous, 4x Daily AC & at Bedtime  pantoprazole, 40 mg, Oral, Q AM  sodium chloride, 10 mL, Intravenous, Q12H      Allergies:  No Known Allergies      Review of Systems  Review of Systems   Constitutional:  Positive for chills. Negative for fever.   Gastrointestinal:  Positive for blood in stool and constipation. Negative for abdominal distention, abdominal pain and rectal pain.       Objective     Vital Signs  Temp:  [97.8 °F (36.6 °C)-98.1 °F (36.7 °C)] 98.1 °F (36.7 °C)  Heart Rate:  [68-85] 72  Resp:  [14-18] 18  BP:  "(103-155)/(69-98) 103/78      Physical Exam  Constitutional:       Appearance: Normal appearance.   Pulmonary:      Effort: Pulmonary effort is normal.   Abdominal:      General: Bowel sounds are normal. There is no distension.      Palpations: Abdomen is soft.      Tenderness: There is no abdominal tenderness. There is no guarding or rebound.   Neurological:      Mental Status: He is alert.           Results Review:     Results from last 7 days   Lab Units 05/01/25  0411 05/01/25  0014 04/30/25  1612   WBC 10*3/mm3  --  5.09 6.80   HEMOGLOBIN g/dL 13.6 13.5  13.5 14.2   HEMATOCRIT % 37.8 38.0  38.0 39.7   PLATELETS 10*3/mm3  --  182 184     Results from last 7 days   Lab Units 04/30/25  1612   SODIUM mmol/L 127*   POTASSIUM mmol/L 3.8   CHLORIDE mmol/L 96*   CO2 mmol/L 21.0*   BUN mg/dL 7   CREATININE mg/dL 0.78   CALCIUM mg/dL 8.7   BILIRUBIN mg/dL 0.7   ALK PHOS U/L 62   ALT (SGPT) U/L 23   AST (SGOT) U/L 20   GLUCOSE mg/dL 105*         No results found for: \"LIPASE\"    Radiology:  No orders to display         Assessment & Plan   Active Hospital Problems    Diagnosis     **BRBPR (bright red blood per rectum)        Assessment:  Hematochezia  History of anal fissures  Constipation  Hx CVA on plavix    Plan:  Patient presents with a few episodes of large-volume hematochezia without abdominal pain.  He has a baseline of constipation which is exacerbated by Ozempic.  Differential includes exacerbation of hemorrhoids vs diverticular bleed vs others.  Hemoglobin is normal at 13.6.  Continue to monitor and transfuse for hemoglobin less than 7.   Will give bowel prep and monitor for continued bleeding.  If the bleeding does not improve, we will plan for colonoscopy as an inpatient.  If the bleeding resolves and he is stable otherwise, can consider discharge home with plans for outpatient follow-up.  Check CRP.  Clear liquid diet.  Plavix has been held.  Recommend daily bowel regimen especially with use of Ozempic.  Has " tried MiraLAX and Dulcolax with minimal improvement in the past.  Consider Linzess on discharge.  Not currently experiencing rectal pain so do not feel he has an active anal fissure.      I discussed the patients findings and my recommendations with patient.             IVETTE Jean-Baptiste  [facility] [address] [address]  Office: [phone]

## 2025-05-01 NOTE — PROGRESS NOTES
ED OBSERVATION PROGRESS/DISCHARGE SUMMARY    Date of Admission: 4/30/2025   LOS: 0 days   PCP: Carmina Awad APRN    Working diagnosis: Hematochezia      Subjective     Hospital Outcome: Pending    Ramirez SHEN Pena is a 59 y.o. male was admitted to the ED observation unit for further evaluation of reported BRBPR.  Patient reports he has been on Ozempic and since being on Ozempic he will sometimes go several days without a bowel movement and then have multiple over the course of the day.  The constipation cycle then resets and continues to recur.  Patient reports yesterday evening after him multiple bowel movements he noticed bright red blood clots on toilet paper after he wiped and reported bright red blood in the toilet.  No vomiting or abdominal pain.  No fever and chills.  GI consulted to further evaluate.  Patient was placed on a clear liquid diet and made n.p.o. at midnight.  His Plavix was held overnight.    Hemoglobin was 15.1 on 4/8/2025.  Yesterday at 4 PM hemoglobin was 14.2.  At midnight it was 13.5 and this morning 13.6 and appearing stable.    12:23 PM.  Patient is been evaluated by gastroenterology.  They are going to plan for bowel prep.  If bleeding persist patient will undergo colonoscopy.  If bleeding resolves and he is otherwise stable we can consider discharge home.  For now we will at least plan on monitoring overnight.  When the patient does go home they will do recommend a daily bowel regimen with him using Ozempic.  Consider Linzess at discharge.  Will continue to trend hemoglobin every 6 hours for now.    ROS:  General: no fevers, chills  Respiratory: no cough, dyspnea  Cardiovascular: no chest pain, palpitations  Abdomen: No abdominal pain, BRBPR  Neurologic: No focal weakness    Objective   Physical Exam:  I have reviewed the vital signs.  Temp:  [97.8 °F (36.6 °C)-98.1 °F (36.7 °C)] 98.1 °F (36.7 °C)  Heart Rate:  [68-85] 73  Resp:  [14-18] 16  BP: (103-155)/(69-98)  117/84  General Appearance:    Alert, cooperative, no distress  Head:    Normocephalic, atraumatic  Eyes:    Sclerae anicteric  Neck:   Supple, no mass  Lungs: Clear to auscultation bilaterally, respirations unlabored  Heart: Regular rate and rhythm, S1 and S2 normal, no murmur, rub or gallop  Abdomen:  Soft, nontender, bowel sounds active, nondistended  Extremities: No clubbing, cyanosis, or edema to lower extremities  Pulses:  2+ and symmetric in distal lower extremities  Skin: No rashes   Neurologic: Oriented x3, Normal strength to extremities    Results Review:    I have reviewed the labs, radiology results and diagnostic studies.    Results from last 7 days   Lab Units 05/01/25  0411 05/01/25  0014   WBC 10*3/mm3  --  5.09   HEMOGLOBIN g/dL 13.6 13.5  13.5   HEMATOCRIT % 37.8 38.0  38.0   PLATELETS 10*3/mm3  --  182     Results from last 7 days   Lab Units 05/01/25  0950 04/30/25  1612   SODIUM mmol/L 129* 127*   POTASSIUM mmol/L 4.1 3.8   CHLORIDE mmol/L 97* 96*   CO2 mmol/L 21.3* 21.0*   BUN mg/dL 6 7   CREATININE mg/dL 0.72* 0.78   CALCIUM mg/dL 8.3* 8.7   BILIRUBIN mg/dL  --  0.7   ALK PHOS U/L  --  62   ALT (SGPT) U/L  --  23   AST (SGOT) U/L  --  20   GLUCOSE mg/dL 84 105*     Imaging Results (Last 24 Hours)       ** No results found for the last 24 hours. **            I have reviewed the medications.     Discharge Medications        ASK your doctor about these medications        Instructions Start Date   ALEVE PO   2 capsules, As Needed      atorvastatin 80 MG tablet  Commonly known as: LIPITOR   80 mg, Oral, Nightly      Azelastine HCl 137 MCG/SPRAY solution   Instill 2 sprays into the nostril(s) as directed by provider 2 (Two) Times a Day.      clopidogrel 75 MG tablet  Commonly known as: PLAVIX   75 mg, Oral, Daily      EPINEPHrine 0.3 MG/0.3ML solution auto-injector injection  Commonly known as: EPIPEN   Use as directed.      fluticasone 50 MCG/ACT nasal spray  Commonly known as: FLONASE   2  sprays, Daily      lisinopril-hydrochlorothiazide 10-12.5 MG per tablet  Commonly known as: PRINZIDE,ZESTORETIC   1 tablet, Oral, Daily      montelukast 10 MG tablet  Commonly known as: SINGULAIR   10 mg, Oral, Daily      Mounjaro 2.5 MG/0.5ML solution auto-injector  Generic drug: Tirzepatide   2.5 mg, Subcutaneous, Weekly      Mounjaro 5 MG/0.5ML solution auto-injector  Generic drug: Tirzepatide   5 mg, Subcutaneous, Weekly      Ozempic (1 MG/DOSE) 4 MG/3ML solution pen-injector  Generic drug: Semaglutide (1 MG/DOSE)   Weekly      pantoprazole 20 MG EC tablet  Commonly known as: Protonix   20 mg, Oral, Daily      terbinafine 250 MG tablet  Commonly known as: lamiSIL   250 mg, Oral, Daily, Per provider: patient needs to keep appointment on 02/02/25.      triamcinolone 0.1 % cream  Commonly known as: KENALOG   Apply topically to the affected areas of upper extremities 2 (Two) Times a Day for 2-3 weeks as needed for flares.              ---------------------------------------------------------------------------------------------  Assessment & Plan   Assessment/Problem List    BRBPR (bright red blood per rectum)      Plan:    Hematochezia  -Cardiac monitor  - Vital signs every 4 hours  - Continue pulse ox  -Hemoglobin 14.2 in ED, was 15.1 on 4/8/2025  - H&H every 6 hours  - GI consult in a.m.  - Clear liquid diet, n.p.o. after midnight  - Hold Plavix overnight  - Gastrointestinal panel PCR/C. difficile toxin-pending     Diabetes  -Accu-Cheks AC&HS  -Adult subcutaneous insulin management-low dose  -Hemoglobin A1c-5.8 on 4/8/2025        VTE Prophylaxis:  Mechanical VTE prophylaxis orders are present.       Disposition: Pending    Follow-up after Discharge: Pending    This note will serve as a progress note    Bill Ma III, PA 05/01/25 12:26 EDT    I have worn appropriate PPE during this patient encounter, sanitized my hands both with entering and exiting patient's room.      50 minutes has been spent by  [facility] providers in the care of this patient while under observation status on this date 05/01/25

## 2025-05-01 NOTE — PROGRESS NOTES
MD ATTESTATION NOTE - Observation progress    The KAITLIN and I have discussed this patient's history, physical exam, and treatment plan.  I have reviewed the documentation and personally had a face to face interaction with the patient. I affirm the documentation and agree with the treatment and plan.  The attached note describes my personal findings.        SHARED APC FACE TO FACE: I performed a substantive part of the MDM during the patient's E/M visit. I personally evaluated and examined the patient. I personally made or approved the documented management plan and acknowledge its risk of complications.        Brief HPI: Patient admitted to the observation unit for evaluation of bright red blood per rectum.  Patient states has not had a bowel movement has been over here.  Has had some loose stools yesterday.  Patient has had no fevers or chills.  No abdominal pain.  Hemoglobin appears stable.            GENERAL: no acute distress  HENT: nares patent  EYES: no scleral icterus  CV: regular rhythm, normal rate  RESPIRATORY: normal effort  ABDOMEN: soft.  Nondistended nontender  MUSCULOSKELETAL: no deformity  NEURO: alert, moves all extremities, follows commands  PSYCH:  calm, cooperative  SKIN: warm, dry    Vital signs and nursing notes reviewed.        Plan: Serial hemoglobin.  GI consult

## 2025-05-01 NOTE — PLAN OF CARE
Goal Outcome Evaluation:  Plan of Care Reviewed With: patient   Remains in obs unit for further evaluation and treatment of his BRBPR. He will remain on clear liquid diet and n.p.o. at midnight. GI has been consult pending. Cdiff r/o pending, no reports of bleeding noted. Vss.H&H every 6 hours, stable H&H 13.5/38-->14.2/ 39.    Problem: Adult Inpatient Plan of Care  Goal: Plan of Care Review  Outcome: Progressing  Flowsheets (Taken 5/1/2025 0648)  Plan of Care Reviewed With: patient  Goal: Patient-Specific Goal (Individualized)  Outcome: Progressing  Goal: Absence of Hospital-Acquired Illness or Injury  Outcome: Progressing  Intervention: Identify and Manage Fall Risk  Description: Perform standard risk assessment on admission using a validated tool or comprehensive approach appropriate to the patient; reassess fall risk frequently, with change in status or transfer to another level of care.Communicate risk to interprofessional healthcare team; ensure fall risk visible cue.Determine need for increased observation, equipment and environmental modification, as well as use of supportive, nonskid footwear.Adjust safety measures to individual needs and identified risk factors.Reinforce the importance of active participation with fall risk prevention, safety, and physical activity with the patient and family.Perform regular intentional rounding to assess need for position change, pain assessment and personal needs, including assistance with toileting.  Recent Flowsheet Documentation  Taken 5/1/2025 0600 by Li Gomes, RN  Safety Promotion/Fall Prevention:   nonskid shoes/slippers when out of bed   safety round/check completed  Taken 5/1/2025 0400 by Li Gomes, RN  Safety Promotion/Fall Prevention:   nonskid shoes/slippers when out of bed   safety round/check completed  Taken 5/1/2025 0200 by Li Gomes, RN  Safety Promotion/Fall Prevention:   nonskid shoes/slippers when out of bed   safety round/check  completed  Taken 5/1/2025 0029 by Li Gomes RN  Safety Promotion/Fall Prevention:   nonskid shoes/slippers when out of bed   safety round/check completed  Taken 4/30/2025 2329 by Li Gomes RN  Safety Promotion/Fall Prevention:   safety round/check completed   nonskid shoes/slippers when out of bed  Taken 4/30/2025 2030 by Li Gomes RN  Safety Promotion/Fall Prevention:   nonskid shoes/slippers when out of bed   safety round/check completed  Intervention: Prevent Skin Injury  Description: Perform a screening for skin injury risk, such as pressure or moisture-associated skin damage on admission and at regular intervals throughout hospital stay.Keep all areas of skin (especially folds) clean and dry.Maintain adequate skin hydration.Relieve and redistribute pressure and protect bony prominences and skin at risk for injury; implement measures based on patient-specific risk factors.Match turning and repositioning schedule to clinical condition.Encourage weight shift frequently; assist with reposition if unable to complete independently.Float heels off bed; avoid pressure on the Achilles tendon.Keep skin free from extended contact with medical devices.Optimize nutrition and hydration.Encourage functional activity and mobility, as early as tolerated.Use aids (e.g., slide boards, mechanical lift) during transfer.  Recent Flowsheet Documentation  Taken 5/1/2025 0600 by Li Gomes RN  Body Position: position changed independently  Taken 5/1/2025 0400 by Li Gomes RN  Body Position: position changed independently  Taken 5/1/2025 0200 by Li Gomes RN  Body Position: position changed independently  Taken 5/1/2025 0029 by Li Gomes RN  Body Position: position changed independently  Taken 4/30/2025 2329 by Li Gomes RN  Body Position: position changed independently  Taken 4/30/2025 2030 by Li Gomes RN  Body Position: position changed independently  Intervention: Prevent  Infection  Description: Maintain skin and mucous membrane integrity; promote hand, oral and pulmonary hygiene.Optimize fluid balance, nutrition, sleep and glycemic control to maximize infection resistance.Identify potential sources of infection early to prevent or mitigate progression of infection (e.g., wound, lines, devices).Evaluate ongoing need for invasive devices; remove promptly when no longer indicated.Review vaccination status.  Recent Flowsheet Documentation  Taken 5/1/2025 0029 by Li Gomes RN  Infection Prevention:   hand hygiene promoted   equipment surfaces disinfected   rest/sleep promoted   single patient room provided  Taken 4/30/2025 2329 by Li Gomes RN  Infection Prevention:   cohorting utilized   equipment surfaces disinfected   hand hygiene promoted   single patient room provided  Taken 4/30/2025 2030 by Li Gomes RN  Infection Prevention:   hand hygiene promoted   equipment surfaces disinfected   rest/sleep promoted   single patient room provided  Goal: Optimal Comfort and Wellbeing  Outcome: Progressing  Intervention: Provide Person-Centered Care  Description: Use a family-focused approach to care; encourage support system presence and participation.Develop trust and rapport by proactively providing information, encouraging questions, addressing concerns and offering reassurance.Acknowledge emotional response to hospitalization.Recognize and utilize personal coping strategies and strengths; develop goals via shared decision-making.Honor spiritual and cultural preferences.  Recent Flowsheet Documentation  Taken 4/30/2025 2329 by Li Gomes RN  Trust Relationship/Rapport:   care explained   questions answered  Goal: Readiness for Transition of Care  Outcome: Progressing     Problem: Comorbidity Management  Goal: Maintenance of Asthma Control  Outcome: Progressing  Intervention: Maintain Asthma Symptom Control  Description: Evaluate adherence to self-management (asthma  action plan), such as medication, symptom control, trigger avoidance and self-monitoring.Advocate for continuation of home regimen, including medication, method of delivery, schedule and symptom monitoring; acknowledge preferred modality and routine.Minimize exposure to potential triggers, such as perfume, cleaning chemicals and all types of smoke.Assess for proper use of inhaled medication and delivery technique; assist or reinstruct if needed.Evaluate effectiveness of coping skills; encourage expression of feelings, expectations and concerns related to disease management and quality of life; reinforce education to enhance management plan and wellbeing.  Recent Flowsheet Documentation  Taken 5/1/2025 0600 by Li Gomes RN  Medication Review/Management: medications reviewed  Taken 5/1/2025 0400 by Li Gomes RN  Medication Review/Management: medications reviewed  Taken 5/1/2025 0200 by Li Gomes RN  Medication Review/Management: medications reviewed  Taken 5/1/2025 0029 by Li Gomes RN  Medication Review/Management: medications reviewed  Taken 4/30/2025 2329 by Li Gomes RN  Medication Review/Management: medications reviewed  Taken 4/30/2025 2030 by Li Gomes RN  Medication Review/Management: medications reviewed  Goal: Maintenance of Behavioral Health Symptom Control  Outcome: Progressing  Intervention: Maintain Behavioral Health Symptom Control  Description: Confirm mental health diagnosis and current treatment.Evaluate participation with previously identified self-management plan.Advocate continuation of home strategies, including medication.Evaluate effectiveness of self-management strategies and coping skills.Consider impact of mental health diagnosis on the current acute medical condition.Communicate with providers to ensure continuity and follow-up at transition.  Recent Flowsheet Documentation  Taken 5/1/2025 0600 by Li Gomes RN  Medication Review/Management:  medications reviewed  Taken 5/1/2025 0400 by Li Gomes RN  Medication Review/Management: medications reviewed  Taken 5/1/2025 0200 by Li Gomes RN  Medication Review/Management: medications reviewed  Taken 5/1/2025 0029 by Li Gomes RN  Medication Review/Management: medications reviewed  Taken 4/30/2025 2329 by Li Gomes RN  Medication Review/Management: medications reviewed  Taken 4/30/2025 2030 by Li Gomes RN  Medication Review/Management: medications reviewed  Goal: Maintenance of COPD Symptom Control  Outcome: Progressing  Intervention: Maintain COPD (Chronic Obstructive Pulmonary Disease) Symptom Control  Description: Evaluate adherence to self-management (COPD action plan), such as medication, symptom control, trigger avoidance, infection prevention and self-monitoring.Advocate for continuation of home regimen, including oxygen, medication and noninvasive positive pressure use.Anticipate the need for breathing techniques and activity pacing to minimize fatigue and breathlessness.Assess for proper use of inhaled medication and delivery technique; assist or reinstruct if needed.Evaluate effectiveness of coping skills; encourage expression of feelings, expectations and concerns related to disease management and quality of life; reinforce education to enhance management plan and wellbeing.  Recent Flowsheet Documentation  Taken 5/1/2025 0600 by Li Gomes RN  Medication Review/Management: medications reviewed  Taken 5/1/2025 0400 by Li Gomes RN  Medication Review/Management: medications reviewed  Taken 5/1/2025 0200 by Li Gomes RN  Medication Review/Management: medications reviewed  Taken 5/1/2025 0029 by Li Gomes RN  Medication Review/Management: medications reviewed  Taken 4/30/2025 2329 by Li Gomes RN  Medication Review/Management: medications reviewed  Taken 4/30/2025 2030 by Li Gomes RN  Medication Review/Management: medications  reviewed  Goal: Blood Glucose Level Within Target Range  Outcome: Progressing  Intervention: Monitor and Manage Glycemia  Description: Establish target blood glucose levels based on patient-specific factors, such as age, diabetes-related complications and illness severity.Document blood glucose levels and monitor trend.Provide antihyperglycemic pharmacologic therapy to maintain blood glucose levels within targeted range.Advocate for patient use of home devices such as insulin pump, continuous glucose monitor; assess for safe and competent participation in care.Check blood glucose level if there is a change in mental or cognitive status.Recognize, treat and document hypoglycemia event and potential cause.Assess current lifestyle patterns; acknowledging positive patterns supporting wellbeing.Evaluate effectiveness of coping skills; encourage expression of feelings, expectations and concerns related to disease management and quality of life; reinforce education to enhance management plan and wellbeing.  Recent Flowsheet Documentation  Taken 5/1/2025 0600 by Li Gomes RN  Medication Review/Management: medications reviewed  Taken 5/1/2025 0400 by Li Gomes RN  Medication Review/Management: medications reviewed  Taken 5/1/2025 0200 by Li Gomes RN  Medication Review/Management: medications reviewed  Taken 5/1/2025 0029 by Li Gomes RN  Medication Review/Management: medications reviewed  Taken 4/30/2025 2329 by Li Gomes RN  Medication Review/Management: medications reviewed  Taken 4/30/2025 2030 by Li Gomes RN  Medication Review/Management: medications reviewed  Goal: Maintenance of Heart Failure Symptom Control  Outcome: Progressing  Intervention: Maintain Heart Failure Management  Description: Evaluate adherence to home heart failure self-care regimen (e.g., medication, fluid balance, sodium intake, daily weight, physical activity, telemonitoring, support).Advocate continuation of  home medication and schedule.Consider pharmacologic therapy administration time and effects (e.g., avoid giving diuretic prior to bedtime or nitrates on empty stomach).Monitor response to pharmacologic therapy, including weight fluctuations, blood pressure and electrolyte levels.Monitor for signs and symptoms of anxiety and depression, including severity and duration; if present, provide psychosocial support.Consider need for heart failure clinic or palliative care consult.  Recent Flowsheet Documentation  Taken 5/1/2025 0600 by Li Gomes RN  Medication Review/Management: medications reviewed  Taken 5/1/2025 0400 by Li Gomes RN  Medication Review/Management: medications reviewed  Taken 5/1/2025 0200 by Li Gomes RN  Medication Review/Management: medications reviewed  Taken 5/1/2025 0029 by Li Gomes RN  Medication Review/Management: medications reviewed  Taken 4/30/2025 2329 by Li Gomes RN  Medication Review/Management: medications reviewed  Taken 4/30/2025 2030 by Li Gomes RN  Medication Review/Management: medications reviewed  Goal: Blood Pressure in Desired Range  Outcome: Progressing  Intervention: Maintain Blood Pressure Management  Description: Evaluate adherence to home antihypertensive regimen (e.g., exercise and activity, diet modification, medication).Provide scheduled antihypertensive medication; consider administration time and effects (e.g., avoid giving diuretic prior to bedtime).Monitor response to antihypertensive medication therapy (e.g., blood pressure, electrolyte levels, medication effects).Minimize risk of orthostatic hypotension; encourage caution with position changes, particularly if elderly.  Recent Flowsheet Documentation  Taken 5/1/2025 0600 by Li Gomes RN  Medication Review/Management: medications reviewed  Taken 5/1/2025 0400 by Li Gomes RN  Medication Review/Management: medications reviewed  Taken 5/1/2025 0200 by Li Gomes  RN  Medication Review/Management: medications reviewed  Taken 5/1/2025 0029 by Li Gomes RN  Medication Review/Management: medications reviewed  Taken 4/30/2025 2329 by Li Gomes RN  Medication Review/Management: medications reviewed  Taken 4/30/2025 2030 by Li Gomes RN  Medication Review/Management: medications reviewed  Goal: Maintenance of Osteoarthritis Symptom Control  Outcome: Progressing  Intervention: Maintain Osteoarthritis Symptom Control  Description: Evaluate adherence to self-management plan, such as medication, exercise and weight management.Advocate for continuation of home regimen, such as medication and physical activity; monitor response.Encourage participation in functional activities, such as mobility and ADLs (activities of daily living) to minimize decline associated with inactivity.Facilitate use of patient-specific assistive devices, equipment or orthoses.Evaluate effectiveness of coping skills; encourage expression of feelings, expectations and concerns related to disease management and quality of life; reinforce education to enhance management plan and wellbeing.  Recent Flowsheet Documentation  Taken 5/1/2025 0600 by Li Gomes RN  Activity Management: up ad magalie  Medication Review/Management: medications reviewed  Taken 5/1/2025 0400 by Li Gomes RN  Activity Management: up ad magalie  Medication Review/Management: medications reviewed  Taken 5/1/2025 0200 by Li Gomes RN  Activity Management: up ad magalie  Medication Review/Management: medications reviewed  Taken 5/1/2025 0029 by Li Gomes RN  Activity Management: up ad magalie  Medication Review/Management: medications reviewed  Taken 4/30/2025 2329 by Li Gomes RN  Activity Management: up ad magalie  Medication Review/Management: medications reviewed  Taken 4/30/2025 2030 by Li Gomes RN  Activity Management: up ad magalie  Medication Review/Management: medications reviewed  Goal: Bariatric Home  Regimen Maintained  Outcome: Progressing  Intervention: Maintain and Manage Postbariatric Surgery Care  Description: Consider type of bariatric surgery and length of time since surgery.Evaluate adherence to self-management, such as diet, vitamin and mineral supplementation, physical activity and adjustment to altered body image.Assess home maintenance regimen for vitamin and mineral supplements; evaluate for potential micronutrient deficiencies, such as vitamin B12, vitamin D and iron; supplement if depleted.Perform a nutrition assessment; include a nutrition-focused physical exam; assess for adequacy of intake to preserve lean body mass.Optimize fluid intake; consider a daily goal.Encourage mobility and physical activity as tolerated.  Recent Flowsheet Documentation  Taken 5/1/2025 0600 by Li Gomes RN  Medication Review/Management: medications reviewed  Taken 5/1/2025 0400 by Li Gomes RN  Medication Review/Management: medications reviewed  Taken 5/1/2025 0200 by Li Gomes RN  Medication Review/Management: medications reviewed  Taken 5/1/2025 0029 by Li Gomes RN  Medication Review/Management: medications reviewed  Taken 4/30/2025 2329 by Li Gomes RN  Medication Review/Management: medications reviewed  Taken 4/30/2025 2030 by Li Gomes RN  Medication Review/Management: medications reviewed  Goal: Maintenance of Seizure Control  Outcome: Progressing  Intervention: Maintain Seizure Symptom Control  Description: Identify risks that may lower seizure threshold (e.g., hypoglycemia, illness, change in medications); assist in treatment.Evaluate adherence to management plan (e.g., medication, symptom control, trigger avoidance, self-monitoring).Advocate for continuation of home regimen, including medication, medication administration schedule, ketogenic diet, sleep schedule and symptom monitoring; acknowledge home management and routine.Minimize potential seizure triggers, such as  stress, sleep deprivation and diet.Evaluate effectiveness of coping skills; encourage expression of feelings, expectations and concerns related to disease management and quality of life; reinforce education to enhance management plan and wellbeing.Maintain seizure precautions, such as removal of potential harmful objects, padded side rails, bed in low position, suction and airway protection equipment readily available.Provide a quiet, calm environment.  Recent Flowsheet Documentation  Taken 5/1/2025 0600 by Li Gomes RN  Medication Review/Management: medications reviewed  Taken 5/1/2025 0400 by Li Gomes RN  Medication Review/Management: medications reviewed  Taken 5/1/2025 0200 by Li Gomes RN  Medication Review/Management: medications reviewed  Taken 5/1/2025 0029 by Li Gomes RN  Medication Review/Management: medications reviewed  Taken 4/30/2025 2329 by Li Gomes RN  Medication Review/Management: medications reviewed  Taken 4/30/2025 2030 by Li Gomes RN  Medication Review/Management: medications reviewed     Problem: Gastrointestinal Bleeding  Goal: Optimal Coping with Acute Illness  Outcome: Progressing  Goal: Hemostasis  Outcome: Progressing

## 2025-05-01 NOTE — PLAN OF CARE
Goal Outcome Evaluation:  Plan of Care Reviewed With: patient           Outcome Evaluation: gave pt a enema had a BM didn't look bloody or like c diff, alert and oriented X4, RA, up ad magalie, will cont to monitor                             Problem: Adult Inpatient Plan of Care  Goal: Plan of Care Review  Outcome: Progressing  Flowsheets (Taken 5/1/2025 1751)  Outcome Evaluation: gave pt a enema had a BM didn't look bloody or like c diff, alert and oriented X4, RA, up ad magalie, will cont to monitor  Plan of Care Reviewed With: patient  Goal: Patient-Specific Goal (Individualized)  Outcome: Progressing  Goal: Absence of Hospital-Acquired Illness or Injury  Outcome: Progressing  Intervention: Identify and Manage Fall Risk  Recent Flowsheet Documentation  Taken 5/1/2025 1604 by Corina See RN  Safety Promotion/Fall Prevention:   assistive device/personal items within reach   clutter free environment maintained   nonskid shoes/slippers when out of bed   room organization consistent   safety round/check completed  Taken 5/1/2025 1400 by Corina See RN  Safety Promotion/Fall Prevention:   assistive device/personal items within reach   clutter free environment maintained   nonskid shoes/slippers when out of bed   room organization consistent   safety round/check completed  Taken 5/1/2025 1215 by Corina See RN  Safety Promotion/Fall Prevention:   assistive device/personal items within reach   clutter free environment maintained   nonskid shoes/slippers when out of bed   room organization consistent   safety round/check completed  Taken 5/1/2025 1000 by Corina See RN  Safety Promotion/Fall Prevention:   assistive device/personal items within reach   clutter free environment maintained   nonskid shoes/slippers when out of bed   room organization consistent   safety round/check completed  Taken 5/1/2025 0826 by Corina See RN  Safety Promotion/Fall Prevention:   assistive device/personal items within  reach   clutter free environment maintained   nonskid shoes/slippers when out of bed   room organization consistent   safety round/check completed  Intervention: Prevent and Manage VTE (Venous Thromboembolism) Risk  Recent Flowsheet Documentation  Taken 5/1/2025 1400 by Corina See RN  VTE Prevention/Management:   bilateral   SCDs (sequential compression devices) off  Taken 5/1/2025 0826 by Corina See RN  VTE Prevention/Management:   bilateral   SCDs (sequential compression devices) off  Intervention: Prevent Infection  Recent Flowsheet Documentation  Taken 5/1/2025 1604 by Corina See RN  Infection Prevention: single patient room provided  Taken 5/1/2025 1400 by Corina See RN  Infection Prevention: single patient room provided  Taken 5/1/2025 1215 by Corina See RN  Infection Prevention: single patient room provided  Taken 5/1/2025 1000 by Corina See RN  Infection Prevention: single patient room provided  Taken 5/1/2025 0826 by Corina See RN  Infection Prevention: single patient room provided  Goal: Optimal Comfort and Wellbeing  Outcome: Progressing  Intervention: Provide Person-Centered Care  Recent Flowsheet Documentation  Taken 5/1/2025 1400 by Corina See RN  Trust Relationship/Rapport:   care explained   thoughts/feelings acknowledged  Taken 5/1/2025 0826 by Corina See RN  Trust Relationship/Rapport:   care explained   thoughts/feelings acknowledged  Goal: Readiness for Transition of Care  Outcome: Progressing

## 2025-05-02 ENCOUNTER — READMISSION MANAGEMENT (OUTPATIENT)
Dept: CALL CENTER | Facility: HOSPITAL | Age: 60
End: 2025-05-02
Payer: COMMERCIAL

## 2025-05-02 ENCOUNTER — TELEPHONE (OUTPATIENT)
Dept: GASTROENTEROLOGY | Facility: CLINIC | Age: 60
End: 2025-05-02
Payer: COMMERCIAL

## 2025-05-02 VITALS
HEIGHT: 69 IN | DIASTOLIC BLOOD PRESSURE: 82 MMHG | OXYGEN SATURATION: 97 % | RESPIRATION RATE: 16 BRPM | TEMPERATURE: 97.5 F | BODY MASS INDEX: 32.11 KG/M2 | SYSTOLIC BLOOD PRESSURE: 134 MMHG | HEART RATE: 65 BPM | WEIGHT: 216.8 LBS

## 2025-05-02 PROBLEM — K59.09 CHRONIC CONSTIPATION: Status: ACTIVE | Noted: 2025-05-02

## 2025-05-02 PROBLEM — E87.1 HYPONATREMIA: Status: ACTIVE | Noted: 2025-05-02

## 2025-05-02 LAB
ANION GAP SERPL CALCULATED.3IONS-SCNC: 10.8 MMOL/L (ref 5–15)
BUN SERPL-MCNC: 6 MG/DL (ref 6–20)
BUN/CREAT SERPL: 8.1 (ref 7–25)
CALCIUM SPEC-SCNC: 8.2 MG/DL (ref 8.6–10.5)
CHLORIDE SERPL-SCNC: 95 MMOL/L (ref 98–107)
CO2 SERPL-SCNC: 21.2 MMOL/L (ref 22–29)
CREAT SERPL-MCNC: 0.74 MG/DL (ref 0.76–1.27)
DEPRECATED RDW RBC AUTO: 42 FL (ref 37–54)
EGFRCR SERPLBLD CKD-EPI 2021: 104.4 ML/MIN/1.73
ERYTHROCYTE [DISTWIDTH] IN BLOOD BY AUTOMATED COUNT: 12.1 % (ref 12.3–15.4)
GLUCOSE BLDC GLUCOMTR-MCNC: 79 MG/DL (ref 70–130)
GLUCOSE SERPL-MCNC: 77 MG/DL (ref 65–99)
HCT VFR BLD AUTO: 40.3 % (ref 37.5–51)
HCT VFR BLD AUTO: 40.6 % (ref 37.5–51)
HGB BLD-MCNC: 13.9 G/DL (ref 13–17.7)
HGB BLD-MCNC: 14 G/DL (ref 13–17.7)
MCH RBC QN AUTO: 32.3 PG (ref 26.6–33)
MCHC RBC AUTO-ENTMCNC: 34.2 G/DL (ref 31.5–35.7)
MCV RBC AUTO: 94.2 FL (ref 79–97)
PLATELET # BLD AUTO: 165 10*3/MM3 (ref 140–450)
PMV BLD AUTO: 9.2 FL (ref 6–12)
POTASSIUM SERPL-SCNC: 3.9 MMOL/L (ref 3.5–5.2)
RBC # BLD AUTO: 4.31 10*6/MM3 (ref 4.14–5.8)
SODIUM SERPL-SCNC: 127 MMOL/L (ref 136–145)
WBC NRBC COR # BLD AUTO: 6.51 10*3/MM3 (ref 3.4–10.8)

## 2025-05-02 PROCEDURE — 80048 BASIC METABOLIC PNL TOTAL CA: CPT

## 2025-05-02 PROCEDURE — 99214 OFFICE O/P EST MOD 30 MIN: CPT

## 2025-05-02 PROCEDURE — 85014 HEMATOCRIT: CPT

## 2025-05-02 PROCEDURE — 82948 REAGENT STRIP/BLOOD GLUCOSE: CPT

## 2025-05-02 PROCEDURE — 85018 HEMOGLOBIN: CPT

## 2025-05-02 PROCEDURE — 85027 COMPLETE CBC AUTOMATED: CPT

## 2025-05-02 PROCEDURE — 25810000003 SODIUM CHLORIDE 0.9 % SOLUTION

## 2025-05-02 PROCEDURE — G0378 HOSPITAL OBSERVATION PER HR: HCPCS

## 2025-05-02 RX ADMIN — SODIUM CHLORIDE 100 ML/HR: 9 INJECTION, SOLUTION INTRAVENOUS at 00:00

## 2025-05-02 RX ADMIN — Medication 10 ML: at 09:37

## 2025-05-02 NOTE — PROGRESS NOTES
Pt admitted to the observation unit from the emergency department with bright blood per rectum.  Has not had any ongoing bleeding, no abdominal pains.  Feels well this morning.     On exam,   General: No acute distress, nontoxic  HEENT: EOMI  Pulm: Symmetric chest rise, nonlabored breathing  CV: Regular rate and rhythm  GI: Nondistended, nontender  MSK: No deformity  Skin: Warm, dry  Neuro: Awake, alert, oriented x 4, moving all extremities, no focal deficits  Psych: Calm, cooperative    Vital signs and nursing notes reviewed.           Plan: Stable hemoglobins, no ongoing bleeding, GI is cleared for discharge with outpatient follow-up and supportive care.  Will likely need outpatient colonoscopy.  Probably diverticular versus hemorrhoidal bleed.  All questions and concerns addressed.  ED return for worsening symptoms as needed.         MD Attestation Note    SHARED VISIT: This visit was performed by BOTH a physician and an APC. The substantive portion of the medical decision making was performed by this attesting physician who made or approved the management plan and takes responsibility for patient management. All studies in the APC note (if performed) were independently interpreted by me.

## 2025-05-02 NOTE — DISCHARGE INSTRUCTIONS
Follow-up with gastroenterology at [facility] on outpatient basis as was discussed.  If you have not heard from gastroenterology by the end of the week I would call the number provided at discharge.  They want to start you on Linzess 72 mcg daily at discharge to help prevent constipation while you are on Ozempic.  I would return to the ER with any further bleeding, lightheadedness, dizziness, palpitations, fever, or should you have any further concerns.    Your sodium was slightly low but stable throughout your ED and observation visit.  Suspect this is due to your hydrochlorothiazide.  This just needs to be monitored.  I would follow-up closely with primary care so this can be trended.

## 2025-05-02 NOTE — PROGRESS NOTES
Gastroenterology   Inpatient Progress Note    Reason for Follow Up:  rectal bleeding    Subjective     Interval History:   Feeling well this morning.  Had a bowel movement that was brown following enema.  Denies abdominal pain.  He is passing gas.  Tolerating clear liquids.      Current Facility-Administered Medications:     atorvastatin (LIPITOR) tablet 80 mg, 80 mg, Oral, Nightly, Juliane Hare APRN, 80 mg at 05/01/25 2043    Calcium Replacement - Follow Nurse / BPA Driven Protocol, , Not Applicable, PRN, Juliane Hare APRN    [Held by provider] clopidogrel (PLAVIX) tablet 75 mg, 75 mg, Oral, Daily, Juliane Hare APRN    dextrose (D50W) (25 g/50 mL) IV injection 25 g, 25 g, Intravenous, Q15 Min PRN, Juliane Hare APRN    dextrose (GLUTOSE) oral gel 15 g, 15 g, Oral, Q15 Min PRN, Juliane Hare APRN    glucagon (GLUCAGEN) injection 1 mg, 1 mg, Intramuscular, Q15 Min PRN, Juliane Hare APRN    lisinopril (PRINIVIL,ZESTRIL) tablet 10 mg, 10 mg, Oral, Nightly, 10 mg at 05/01/25 2043 **AND** hydroCHLOROthiazide tablet 12.5 mg, 12.5 mg, Oral, Nightly, Og Beltran APRN, 12.5 mg at 05/01/25 2043    insulin lispro (HUMALOG/ADMELOG) injection 2-7 Units, 2-7 Units, Subcutaneous, 4x Daily AC & at Bedtime, Juliane Hare APRN    Magnesium Standard Dose Replacement - Follow Nurse / BPA Driven Protocol, , Not Applicable, PRN, Juliane Hare APRN    montelukast (SINGULAIR) tablet 10 mg, 10 mg, Oral, Nightly, Og Beltran APRN, 10 mg at 05/01/25 2043    nitroglycerin (NITROSTAT) SL tablet 0.4 mg, 0.4 mg, Sublingual, Q5 Min PRN, Juliane Hare APRN    ondansetron ODT (ZOFRAN-ODT) disintegrating tablet 4 mg, 4 mg, Oral, Q6H PRN **OR** ondansetron (ZOFRAN) injection 4 mg, 4 mg, Intravenous, Q6H PRN, Juliane Hare APRN    pantoprazole (PROTONIX) EC tablet 40 mg, 40 mg, Oral, Nightly, Og Beltran APRN, 40 mg at 05/01/25 2043    Phosphorus Replacement - Follow Nurse / BPA Driven Protocol, , Not Applicable, PRN, Payam,  Juliane S, APRN    Potassium Replacement - Follow Nurse / BPA Driven Protocol, , Not Applicable, PRN, Payam, Juliane S, APRN    sodium chloride 0.9 % flush 10 mL, 10 mL, Intravenous, Q12H, Juliane Hare S, APRN, 10 mL at 05/01/25 2044    sodium chloride 0.9 % flush 10 mL, 10 mL, Intravenous, PRN, Payam, Juliane S, APRN    sodium chloride 0.9 % infusion 40 mL, 40 mL, Intravenous, PRN, Payam, Juliane S, APRN    sodium chloride 0.9 % infusion, 100 mL/hr, Intravenous, Continuous, Beltran, Kajarek, APRN, Last Rate: 100 mL/hr at 05/02/25 0000, 100 mL/hr at 05/02/25 0000      Review of Systems   Constitutional:  Negative for chills and fever.   Gastrointestinal:  Negative for abdominal pain and blood in stool.         Objective     Vital Signs  Temp:  [97.5 °F (36.4 °C)-98.1 °F (36.7 °C)] 97.5 °F (36.4 °C)  Heart Rate:  [65-77] 65  Resp:  [16-18] 16  BP: (103-142)/(72-97) 134/82  Body mass index is 32.02 kg/m².    Intake/Output Summary (Last 24 hours) at 5/2/2025 0755  Last data filed at 5/1/2025 1709  Gross per 24 hour   Intake 1200 ml   Output --   Net 1200 ml     No intake/output data recorded.       Physical Exam  Constitutional:       Appearance: Normal appearance.   Pulmonary:      Effort: Pulmonary effort is normal.   Abdominal:      General: Abdomen is flat. There is no distension.      Palpations: Abdomen is soft.      Tenderness: There is no abdominal tenderness. There is no guarding or rebound.   Neurological:      General: No focal deficit present.      Mental Status: He is alert and oriented to person, place, and time. He is disoriented.            Results Review      Results from last 7 days   Lab Units 05/02/25  0624 05/02/25  0000 05/01/25  1815 05/01/25  0411 05/01/25  0014 04/30/25  1612   WBC 10*3/mm3 6.51  --   --   --  5.09 6.80   HEMOGLOBIN g/dL 13.9 14.0 14.1   < > 13.5  13.5 14.2   HEMATOCRIT % 40.6 40.3 40.6   < > 38.0  38.0 39.7   PLATELETS 10*3/mm3 165  --   --   --  182 184    < > = values in this interval  "not displayed.     Results from last 7 days   Lab Units 05/02/25  0624 05/01/25  0950 04/30/25  1612   SODIUM mmol/L 127* 129* 127*   POTASSIUM mmol/L 3.9 4.1 3.8   CHLORIDE mmol/L 95* 97* 96*   CO2 mmol/L 21.2* 21.3* 21.0*   BUN mg/dL 6 6 7   CREATININE mg/dL 0.74* 0.72* 0.78   CALCIUM mg/dL 8.2* 8.3* 8.7   BILIRUBIN mg/dL  --   --  0.7   ALK PHOS U/L  --   --  62   ALT (SGPT) U/L  --   --  23   AST (SGOT) U/L  --   --  20   GLUCOSE mg/dL 77 84 105*         No results found for: \"LIPASE\"    Radiology:  No orders to display         Assessment & Plan     Active Hospital Problems    Diagnosis     **BRBPR (bright red blood per rectum)        Assessment:  Hematochezia  History of anal fissures  Constipation  Hx CVA on plavix      Results Reviewed:  CBC (No Diff) (05/02/2025 06:24) WBC 6.5, Hgb 13.9,     Endo hx  COLONOSCOPY (09/28/2021 08:23) - colon polyps, multiple small and large mouth diverticula, internal/external hemorrhoids-recall 5 years  UPPER GI ENDOSCOPY (09/28/2021 08:23)-grade B esophagitis, gastritis  Tissue Pathology Exam (09/28/2021 08:33)-GE junction with reflux esophagitis and repair, negative for dysplasia, TA/HP colon polyps    Plan:  Patient presents with a few episodes of large-volume hematochezia without abdominal pain.  He has a baseline of constipation which is exacerbated by Ozempic.  Differential includes exacerbation of hemorrhoids vs diverticular bleed vs others.  Hemoglobin has remained normal throughout admission.  Today is 13.9.  No further rectal bleeding noted following bowel prep and enema.  Had a brown stool following enema.  CRP is normal and he is not having any abd pain.   Okay for discharge from GI standpoint as long as medically stable otherwise.  Recommend discharge home with Linzess 72mcg daily.  Discussed that he should take this on an empty stomach.  Suspect worsening constipation is related to Ozempic.  Follow-up with [facility] and plan colonoscopy as an outpt. " Suspect diverticular bleed vs hemorrhoids.       I discussed the patients findings and my recommendations with patient.             IVETTE Jean-Baptiste  [facility] [place] [id number]  Office: [phone]

## 2025-05-02 NOTE — OUTREACH NOTE
Prep Survey      Flowsheet Row Responses   [facility] patient discharged from? [place]   Is LACE score < 7 ? Yes   Eligibility [facility]   Date of Admission 04/30/25   Date of Discharge 05/02/25   Discharge Disposition Home or Self Care   Discharge diagnosis Hematochezia   Does the patient have one of the following disease processes/diagnoses(primary or secondary)? Other   Does the patient have Home health ordered? No   Is there a DME ordered? No   Prep survey completed? Yes            JOON THORNTON - Registered Nurse

## 2025-05-02 NOTE — TELEPHONE ENCOUNTER
"Received the following bubble message from IVETTE Jean-Baptiste from the hospital...    \"D/C from hospital today. Needs follow up in 1-2 months with [facility] [place]\"    Called pt and got him scheduled for 7/2/202 with [facility].   "

## 2025-05-02 NOTE — PLAN OF CARE
Goal Outcome Evaluation:      Pt. Denies any pain currently. Unable to give stool sample currently. GI following. NS infusing @100mL.

## 2025-05-02 NOTE — CASE MANAGEMENT/SOCIAL WORK
Case Management Discharge Note      Final Note: Home via private vehicle         Selected Continued Care - Discharged on 5/2/2025 Admission date: 4/30/2025 - Discharge disposition: Home or Self Care      Destination    No services have been selected for the patient.                Durable Medical Equipment    No services have been selected for the patient.                Dialysis/Infusion    No services have been selected for the patient.                Home Medical Care    No services have been selected for the patient.                Therapy    No services have been selected for the patient.                Community Resources    No services have been selected for the patient.                Community & DME    No services have been selected for the patient.                    Selected Continued Care - Episodes Includes continued care and service providers with selected services from the active episodes listed below          Transportation Services  Private: Car    Final Discharge Disposition Code: 01 - home or self-care

## 2025-05-02 NOTE — DISCHARGE SUMMARY
ED OBSERVATION PROGRESS/DISCHARGE SUMMARY    Date of Admission: 4/30/2025   LOS: 0 days   PCP: Carmina Awad APRN    Final Diagnosis hematochezia, hyponatremia      Subjective     Hospital Outcome:     Ramirez Pena is a 59 y.o. male was admitted to the ED observation unit for further evaluation of reported BRBPR.  Patient reports he has been on Ozempic and since being on Ozempic he will sometimes go several days without a bowel movement and then have multiple over the course of the day.  The constipation cycle then resets and continues to recur.  Patient reports yesterday evening after him multiple bowel movements he noticed bright red blood clots on toilet paper after he wiped and reported bright red blood in the toilet.  No vomiting or abdominal pain.  No fever and chills.  GI consulted to further evaluate.  Patient was placed on a clear liquid diet and made n.p.o. at midnight.  His Plavix was held overnight.     Hemoglobin was 15.1 on 4/8/2025.  Yesterday at 4 PM hemoglobin was 14.2.  At midnight it was 13.5 and this morning 13.6 and appearing stable.     12:23 PM.  Patient is been evaluated by gastroenterology.  They are going to plan for bowel prep.  If bleeding persist patient will undergo colonoscopy.  If bleeding resolves and he is otherwise stable we can consider discharge home.  For now we will at least plan on monitoring overnight.  When the patient does go home they will do recommend a daily bowel regimen with him using Ozempic.  Consider Linzess at discharge.  Will continue to trend hemoglobin every 6 hours for now.     05/02/25  Abdominal bowel movement or bleeding overnight.  He has been passing gas and denies any GI discomfort.  Hemoglobin is stable, around 14.0.     9:49 AM.  Patient's hemoglobin has remained stable.  GI is evaluated this morning.  No further bleeding.  Patient cleared for discharge.  GI is to arrange follow-up appointment for colonoscopy in the near future.  Patient  knows to return to the ED with any further bleeding, lightheadedness, dizziness, fever, or should he have any further concerns.  They do recommend discharging home on Linzess 73 mcg a day.  Suspect his worsening constipation is related to his Ozempic.    ROS:  General: no fevers, chills  Respiratory: no cough, dyspnea  Cardiovascular: no chest pain, palpitations  Abdomen: No abdominal pain, BRBPR  Neurologic: No focal weakness     Objective   Physical Exam:   Constitutional: Awake, alert. Well developed for age. Nontoxic appearing.   Eyes: PERRL x2, sclerae anicteric, no conjunctival injection. No EOM abnormalities noted.   HENT: NCAT, mucous membranes moist,   Neck: Supple, no thyromegaly, no lymphadenopathy, trachea midline  Respiratory: Clear to auscultation bilaterally, nonlabored respirations   Cardiovascular: RRR, no murmurs, rubs, or gallops, palpable pedal pulses bilaterally. No appreciable edema.   Gastrointestinal: Positive bowel sounds, soft, nontender, not distended.   Musculoskeletal: No bilateral ankle edema, no clubbing or cyanosis to extremities. No obvious deformities.   Psychiatric: Appropriate affect, cooperative. Converses appropriately for age.   Neurologic: Oriented x 3, strength symmetric in all extremities. Cranial nerves grossly intact to confrontation, speech clear  Skin: No rashes, skin intact.     Results Review:    I have reviewed the labs, radiology results and diagnostic studies.    Results from last 7 days   Lab Units 05/02/25  0624   WBC 10*3/mm3 6.51   HEMOGLOBIN g/dL 13.9   HEMATOCRIT % 40.6   PLATELETS 10*3/mm3 165     Results from last 7 days   Lab Units 05/02/25  0624 05/01/25  0950 04/30/25  1612   SODIUM mmol/L 127* 129* 127*   POTASSIUM mmol/L 3.9 4.1 3.8   CHLORIDE mmol/L 95* 97* 96*   CO2 mmol/L 21.2* 21.3* 21.0*   BUN mg/dL 6 6 7   CREATININE mg/dL 0.74* 0.72* 0.78   CALCIUM mg/dL 8.2* 8.3* 8.7   BILIRUBIN mg/dL  --   --  0.7   ALK PHOS U/L  --   --  62   ALT (SGPT) U/L   --   --  23   AST (SGOT) U/L  --   --  20   GLUCOSE mg/dL 77 84 105*     Imaging Results (Last 24 Hours)       ** No results found for the last 24 hours. **            I have reviewed the medications.     Discharge Medications        ASK your doctor about these medications        Instructions Start Date   ALEVE PO   2 capsules, As Needed      atorvastatin 80 MG tablet  Commonly known as: LIPITOR   80 mg, Oral, Nightly      Azelastine HCl 137 MCG/SPRAY solution   Instill 2 sprays into the nostril(s) as directed by provider 2 (Two) Times a Day.      clopidogrel 75 MG tablet  Commonly known as: PLAVIX   75 mg, Oral, Daily      EPINEPHrine 0.3 MG/0.3ML solution auto-injector injection  Commonly known as: EPIPEN   Use as directed.      fluticasone 50 MCG/ACT nasal spray  Commonly known as: FLONASE   2 sprays, Daily      lisinopril-hydrochlorothiazide 10-12.5 MG per tablet  Commonly known as: PRINZIDE,ZESTORETIC   1 tablet, Oral, Daily      montelukast 10 MG tablet  Commonly known as: SINGULAIR   10 mg, Oral, Daily      Mounjaro 2.5 MG/0.5ML solution auto-injector  Generic drug: Tirzepatide   2.5 mg, Subcutaneous, Weekly      Mounjaro 5 MG/0.5ML solution auto-injector  Generic drug: Tirzepatide   5 mg, Subcutaneous, Weekly      Ozempic (1 MG/DOSE) 4 MG/3ML solution pen-injector  Generic drug: Semaglutide (1 MG/DOSE)   Weekly      pantoprazole 20 MG EC tablet  Commonly known as: Protonix   20 mg, Oral, Daily      terbinafine 250 MG tablet  Commonly known as: lamiSIL   250 mg, Oral, Daily, Per provider: patient needs to keep appointment on 02/02/25.      triamcinolone 0.1 % cream  Commonly known as: KENALOG   Apply topically to the affected areas of upper extremities 2 (Two) Times a Day for 2-3 weeks as needed for flares.              ---------------------------------------------------------------------------------------------  Assessment & Plan   Assessment/Problem List    BRBPR (bright red blood per  rectum)      Plan:    Hematochezia  -Cardiac monitor  - Vital signs every 4 hours  - Continue pulse ox  -Hemoglobin 14.2 in ED, was 15.1 on 4/8/2025  - H&H every 6 hours  - GI consult in a.m.  - Clear liquid diet, n.p.o. after midnight  - Resume Plavix  -Plan as above     Diabetes  - Continue home medication    Hyponatremia  -Stable 127/129, likely due to HCTZ.  Asymptomatic  - Will have follow-up with primary care so this can be trended.    Disposition: Discharge    Follow-up after Discharge: Yandel Coates, primary care    This note will serve as a discharge summary    Bill Ma III, PA 05/02/25 09:55 EDT    I have worn appropriate PPE during this patient encounter, sanitized my hands both with entering and exiting patient's room.      30 minutes has been spent by [facility] providers in the care of this patient while under observation status

## 2025-05-03 RX ORDER — TIRZEPATIDE 2.5 MG/.5ML
2.5 INJECTION, SOLUTION SUBCUTANEOUS WEEKLY
Qty: 2 ML | Refills: 2 | Status: SHIPPED | OUTPATIENT
Start: 2025-05-03

## 2025-05-05 ENCOUNTER — TRANSITIONAL CARE MANAGEMENT TELEPHONE ENCOUNTER (OUTPATIENT)
Dept: CALL CENTER | Facility: HOSPITAL | Age: 60
End: 2025-05-05
Payer: COMMERCIAL

## 2025-05-05 NOTE — OUTREACH NOTE
Call Center TCM Note      Flowsheet Row Responses   [facility] patient discharged from? [place]   Does the patient have one of the following disease processes/diagnoses(primary or secondary)? Other   TCM attempt successful? Yes   Call start time 1646   Call end time 1650   Discharge diagnosis Hematochezia   Person spoke with today (if not patient) and relationship pt   Meds reviewed with patient/caregiver? Yes   Is the patient having any side effects they believe may be caused by any medication additions or changes? No   Does the patient have all medications ordered at discharge? Yes   Is the patient taking all medications as directed (includes completed medication regime)? Yes   Comments Upcoming GI fu appt   Does the patient have an appointment with their PCP within 7-14 days of discharge? No appointments available   Nursing Interventions Routed TCM call to PCP office, PCP office requested to make appointment - message sent   Psychosocial issues? No   Did the patient receive a copy of their discharge instructions? Yes   Nursing interventions Reviewed instructions with patient   What is the patient's perception of their health status since discharge? Improving   Is the patient/caregiver able to teach back signs and symptoms related to disease process for when to call PCP? Yes   Is the patient/caregiver able to teach back signs and symptoms related to disease process for when to call 911? Yes   Is the patient/caregiver able to teach back the hierarchy of who to call/visit for symptoms/problems? PCP, Specialist, Home health nurse, Urgent Care, ED, 911 Yes   If the patient is a current smoker, are they able to teach back resources for cessation? Not a smoker   TCM call completed? Yes   Wrap up additional comments Pt denies any blood with BMs. Reviewed AVS/meds with pt. Pt verified GI fu appt. Message routed to PCP office to help with TCM appt as there are no appts that satisfy TCM.   Call end time 1650    Would this patient benefit from a Referral to [facility] Social Work? No   Is the patient interested in additional calls from an ambulatory ? No            MICHELINE ESCALONA - Registered Nurse    5/5/2025, 16:51 EDT

## 2025-05-05 NOTE — OUTREACH NOTE
Call Center TCM Note      Flowsheet Row Responses   [facility] patient discharged from? [place]   Does the patient have one of the following disease processes/diagnoses(primary or secondary)? Other   TCM attempt successful? No   Unsuccessful attempts Attempt 1   Call Status Left message            Kim Gonzalez Registered Nurse    5/5/2025, 16:20 EDT

## 2025-05-16 ENCOUNTER — OFFICE VISIT (OUTPATIENT)
Dept: FAMILY MEDICINE CLINIC | Facility: CLINIC | Age: 60
End: 2025-05-16
Payer: COMMERCIAL

## 2025-05-16 VITALS
OXYGEN SATURATION: 98 % | RESPIRATION RATE: 18 BRPM | SYSTOLIC BLOOD PRESSURE: 130 MMHG | HEIGHT: 69 IN | DIASTOLIC BLOOD PRESSURE: 82 MMHG | BODY MASS INDEX: 31.99 KG/M2 | HEART RATE: 64 BPM | WEIGHT: 216 LBS

## 2025-05-16 DIAGNOSIS — Z11.3 SCREEN FOR STD (SEXUALLY TRANSMITTED DISEASE): ICD-10-CM

## 2025-05-16 DIAGNOSIS — E87.1 HYPONATREMIA: Primary | ICD-10-CM

## 2025-05-16 DIAGNOSIS — K62.5 BRBPR (BRIGHT RED BLOOD PER RECTUM): ICD-10-CM

## 2025-05-16 NOTE — PROGRESS NOTES
Chief Complaint  Chief Complaint   Patient presents with    Hospital Follow Up Visit     Pt here for diarrhea and blood in stool     Labs Only     Pt here to get labs done        Transitional Care Progress Note        Subjective    History of Present Illness        Ramirez Pena is a 59 y.o. male who presents for a transitional care management visit.    Within 48 business hours after discharge our office contacted him via telephone to coordinate his care and needs.      I reviewed and discussed the details of that call along with the discharge summary, hospital problems, inpatient lab results, inpatient diagnostic studies, and consultation reports with Ramirez.     Current outpatient and discharge medications have been reconciled for the patient.  Reviewed by: Leobardo Calderon Sr., MD          5/2/2025    12:14 PM   Date of TCM Phone Call   [facility]   Date of Admission 4/30/2025   Date of Discharge 5/2/2025   Discharge Disposition Home or Self Care     Risk for Readmission (LACE) No data recorded      Ramirez Pena presents to [facility] for   History of Present Illness  The patient presents for evaluation of hyponatremia and constipation.    A few weeks ago, he was hospitalized due to a transition from Ozempic to Mounjaro, which resulted in significant constipation. He experienced periods of 3 to 4 days without bowel movements, followed by a day of frequent bathroom visits. This pattern seemed to exacerbate an underlying condition, leading to an episode of bloody diarrhea. He has a history of diverticulitis and has experienced similar bleeding episodes in the past. Despite feeling well overall, he sought medical attention and was advised to visit the emergency room. During his hospital stay, he underwent blood work and was diagnosed with hyponatremia. He reports that his sodium levels remained low despite his expectations for them to normalize. He is  "currently on a liquid diet and has started a new medication regimen, which has improved his symptoms. He has an upcoming appointment with a gastroenterologist in July to rule out malignancy.    He also requests a repeat STD test due to a previous exposure to syphilis, even though his initial test was negative.     History of Present Illness      Objective   Vital Signs:   Visit Vitals  /82   Pulse 64   Resp 18   Ht 175.3 cm (69\")   Wt 98 kg (216 lb)   SpO2 98%   BMI 31.90 kg/m²       BMI is >= 30 and <35. (Class 1 Obesity). The following options were offered after discussion;: exercise counseling/recommendations and nutrition counseling/recommendations     Physical Exam  Vitals reviewed.   Constitutional:       Appearance: He is well-developed.   HENT:      Head: Normocephalic.      Right Ear: External ear normal.      Left Ear: External ear normal.      Nose: Nose normal.   Eyes:      Conjunctiva/sclera: Conjunctivae normal.   Cardiovascular:      Rate and Rhythm: Normal rate and regular rhythm.   Pulmonary:      Effort: Pulmonary effort is normal.      Breath sounds: Normal breath sounds.   Musculoskeletal:         General: Normal range of motion.      Cervical back: Normal range of motion and neck supple.   Skin:     General: Skin is warm and dry.      Capillary Refill: Capillary refill takes less than 2 seconds.   Neurological:      Mental Status: He is alert and oriented to person, place, and time.        Physical Exam  Respiratory: Clear to auscultation, no wheezing, rales or rhonchi  Gastrointestinal: Soft, no tenderness, no distention, no masses        Result Review :      CMP          5/1/2025    09:50 5/2/2025    06:24 5/16/2025    11:03   CMP   Glucose 84  77  87    BUN 6  6  5    Creatinine 0.72  0.74  0.80    EGFR 105.2  104.4  102    Sodium 129  127  134    Potassium 4.1  3.9  4.1    Chloride 97  95  97    Calcium 8.3  8.2  8.9    Total Protein   6.4    Albumin   4.5    Globulin   1.9    Total " Bilirubin   0.4    Alkaline Phosphatase   57    AST (SGOT)   25    ALT (SGPT)   35    BUN/Creatinine Ratio 8.3  8.1  6    Anion Gap 10.7  10.8       CBC w/diff          5/1/2025    00:14 5/1/2025    04:11 5/1/2025    11:53 5/1/2025    18:15 5/2/2025    00:00 5/2/2025    06:24 5/16/2025    11:03   CBC w/Diff   WBC 5.09      6.51  5.5    RBC 4.18      4.31  4.35    Hemoglobin 13.5     13.5  13.6  14.0  14.1  14.0  13.9  13.9    Hematocrit 38.0     38.0  37.8  39.6  40.6  40.3  40.6  41.1    MCV 90.9      94.2  95    MCH 32.3      32.3  32.0    MCHC 35.5      34.2  33.8    RDW 11.7      12.1  12.1    Platelets 182      165  217    Neutrophil Rel %       53    Lymphocyte Rel %       30    Monocyte Rel %       11    Eosinophil Rel %       4    Basophil Rel %       1         Results               Assessment and Plan      Diagnoses and all orders for this visit:    1. Hyponatremia (Primary)  Assessment & Plan:  - Advised to increase intake of electrolytes, such as Gatorade or Liquid I.V., to address low sodium levels.  - Physical exam findings include no pain or discomfort, but a slight weird feeling upon palpation.  - Comprehensive metabolic panel (CMP) will be conducted to monitor sodium levels.  - Discussed symptoms of low sodium, such as confusion and lethargy, and recommended drinking more electrolytes.    Orders:  -     Comprehensive Metabolic Panel    2. BRBPR (bright red blood per rectum)  Assessment & Plan:  - Experienced significant bleeding leading to a hospital visit.  - Physical exam findings include no significant pain or discomfort.  - Advised to follow up with gastroenterologist to rule out serious conditions, including cancer.  - Colonoscopy scheduled for next year to monitor condition.    Orders:  -     CBC & Differential    3. Screen for STD (sexually transmitted disease)  Assessment & Plan:  - Requested blood work for STDs due to past exposure to syphilis.  - Physical exam findings include no  significant pain or discomfort.  - RPR test for syphilis will be conducted.  - Patient expressed confidence in not having syphilis but agreed to testing for precaution.    Orders:  -     RPR, Rfx Qn RPR / Confirm TP       Assessment & Plan          Follow Up   No follow-ups on file.  Patient was given instructions and counseling regarding his condition or for health maintenance advice. Please see specific information pulled into the AVS if appropriate.

## 2025-05-17 LAB
ALBUMIN SERPL-MCNC: 4.5 G/DL (ref 3.8–4.9)
ALP SERPL-CCNC: 57 IU/L (ref 44–121)
ALT SERPL-CCNC: 35 IU/L (ref 0–44)
AST SERPL-CCNC: 25 IU/L (ref 0–40)
BASOPHILS # BLD AUTO: 0 X10E3/UL (ref 0–0.2)
BASOPHILS NFR BLD AUTO: 1 %
BILIRUB SERPL-MCNC: 0.4 MG/DL (ref 0–1.2)
BUN SERPL-MCNC: 5 MG/DL (ref 6–24)
BUN/CREAT SERPL: 6 (ref 9–20)
CALCIUM SERPL-MCNC: 8.9 MG/DL (ref 8.7–10.2)
CHLORIDE SERPL-SCNC: 97 MMOL/L (ref 96–106)
CO2 SERPL-SCNC: 20 MMOL/L (ref 20–29)
CREAT SERPL-MCNC: 0.8 MG/DL (ref 0.76–1.27)
EGFRCR SERPLBLD CKD-EPI 2021: 102 ML/MIN/1.73
EOSINOPHIL # BLD AUTO: 0.2 X10E3/UL (ref 0–0.4)
EOSINOPHIL NFR BLD AUTO: 4 %
ERYTHROCYTE [DISTWIDTH] IN BLOOD BY AUTOMATED COUNT: 12.1 % (ref 11.6–15.4)
GLOBULIN SER CALC-MCNC: 1.9 G/DL (ref 1.5–4.5)
GLUCOSE SERPL-MCNC: 87 MG/DL (ref 70–99)
HCT VFR BLD AUTO: 41.1 % (ref 37.5–51)
HGB BLD-MCNC: 13.9 G/DL (ref 13–17.7)
IMM GRANULOCYTES # BLD AUTO: 0 X10E3/UL (ref 0–0.1)
IMM GRANULOCYTES NFR BLD AUTO: 1 %
LYMPHOCYTES # BLD AUTO: 1.6 X10E3/UL (ref 0.7–3.1)
LYMPHOCYTES NFR BLD AUTO: 30 %
MCH RBC QN AUTO: 32 PG (ref 26.6–33)
MCHC RBC AUTO-ENTMCNC: 33.8 G/DL (ref 31.5–35.7)
MCV RBC AUTO: 95 FL (ref 79–97)
MONOCYTES # BLD AUTO: 0.6 X10E3/UL (ref 0.1–0.9)
MONOCYTES NFR BLD AUTO: 11 %
NEUTROPHILS # BLD AUTO: 2.9 X10E3/UL (ref 1.4–7)
NEUTROPHILS NFR BLD AUTO: 53 %
PLATELET # BLD AUTO: 217 X10E3/UL (ref 150–450)
POTASSIUM SERPL-SCNC: 4.1 MMOL/L (ref 3.5–5.2)
PROT SERPL-MCNC: 6.4 G/DL (ref 6–8.5)
RBC # BLD AUTO: 4.35 X10E6/UL (ref 4.14–5.8)
RPR SER QL: NON REACTIVE
SODIUM SERPL-SCNC: 134 MMOL/L (ref 134–144)
WBC # BLD AUTO: 5.5 X10E3/UL (ref 3.4–10.8)

## 2025-05-22 PROBLEM — Z11.3 SCREEN FOR STD (SEXUALLY TRANSMITTED DISEASE): Status: ACTIVE | Noted: 2025-05-22

## 2025-05-22 NOTE — ASSESSMENT & PLAN NOTE
- Requested blood work for STDs due to past exposure to syphilis.  - Physical exam findings include no significant pain or discomfort.  - RPR test for syphilis will be conducted.  - Patient expressed confidence in not having syphilis but agreed to testing for precaution.

## 2025-05-22 NOTE — ASSESSMENT & PLAN NOTE
- Experienced significant bleeding leading to a hospital visit.  - Physical exam findings include no significant pain or discomfort.  - Advised to follow up with gastroenterologist to rule out serious conditions, including cancer.  - Colonoscopy scheduled for next year to monitor condition.

## 2025-05-22 NOTE — ASSESSMENT & PLAN NOTE
- Advised to increase intake of electrolytes, such as Gatorade or Liquid I.V., to address low sodium levels.  - Physical exam findings include no pain or discomfort, but a slight weird feeling upon palpation.  - Comprehensive metabolic panel (CMP) will be conducted to monitor sodium levels.  - Discussed symptoms of low sodium, such as confusion and lethargy, and recommended drinking more electrolytes.

## 2025-05-28 RX ORDER — AZELASTINE 1 MG/ML
2 SPRAY, METERED NASAL 2 TIMES DAILY
Qty: 90 ML | Refills: 3 | Status: SHIPPED | OUTPATIENT
Start: 2025-05-28

## 2025-06-05 ENCOUNTER — HOSPITAL ENCOUNTER (OUTPATIENT)
Dept: CARDIOLOGY | Facility: HOSPITAL | Age: 60
Discharge: HOME | End: 2025-06-05
Admitting: NURSE PRACTITIONER
Payer: COMMERCIAL

## 2025-06-05 DIAGNOSIS — I65.21 CAROTID STENOSIS, ASYMPTOMATIC, RIGHT: ICD-10-CM

## 2025-06-05 LAB
BH CV XLRA MEAS LEFT DIST CCA EDV: -31.7 CM/SEC
BH CV XLRA MEAS LEFT DIST CCA PSV: -109.3 CM/SEC
BH CV XLRA MEAS LEFT DIST ICA EDV: -20.6 CM/SEC
BH CV XLRA MEAS LEFT DIST ICA PSV: -68 CM/SEC
BH CV XLRA MEAS LEFT ICA/CCA RATIO: 0.7
BH CV XLRA MEAS LEFT MID ICA EDV: -24.1 CM/SEC
BH CV XLRA MEAS LEFT MID ICA PSV: -72.4 CM/SEC
BH CV XLRA MEAS LEFT PROX CCA EDV: 20.5 CM/SEC
BH CV XLRA MEAS LEFT PROX CCA PSV: 110 CM/SEC
BH CV XLRA MEAS LEFT PROX ECA EDV: -17.4 CM/SEC
BH CV XLRA MEAS LEFT PROX ECA PSV: -105.6 CM/SEC
BH CV XLRA MEAS LEFT PROX ICA EDV: -21.1 CM/SEC
BH CV XLRA MEAS LEFT PROX ICA PSV: -76.8 CM/SEC
BH CV XLRA MEAS LEFT PROX SCLA PSV: 160.8 CM/SEC
BH CV XLRA MEAS LEFT VERTEBRAL A EDV: -14 CM/SEC
BH CV XLRA MEAS LEFT VERTEBRAL A PSV: -48.7 CM/SEC
BH CV XLRA MEAS RIGHT CAROTID BULB EDV: -38.4 CM/SEC
BH CV XLRA MEAS RIGHT CAROTID BULB PSV: -144.2 CM/SEC
BH CV XLRA MEAS RIGHT DIST CCA EDV: 23.6 CM/SEC
BH CV XLRA MEAS RIGHT DIST CCA PSV: 103.1 CM/SEC
BH CV XLRA MEAS RIGHT DIST ICA EDV: -31.4 CM/SEC
BH CV XLRA MEAS RIGHT DIST ICA PSV: -87.8 CM/SEC
BH CV XLRA MEAS RIGHT ICA/CCA RATIO: -1.74
BH CV XLRA MEAS RIGHT MID ICA EDV: -30.6 CM/SEC
BH CV XLRA MEAS RIGHT MID ICA PSV: -107.4 CM/SEC
BH CV XLRA MEAS RIGHT PROX CCA EDV: 25.5 CM/SEC
BH CV XLRA MEAS RIGHT PROX CCA PSV: 115.6 CM/SEC
BH CV XLRA MEAS RIGHT PROX ECA EDV: -41.2 CM/SEC
BH CV XLRA MEAS RIGHT PROX ECA PSV: -183.8 CM/SEC
BH CV XLRA MEAS RIGHT PROX ICA EDV: -47.6 CM/SEC
BH CV XLRA MEAS RIGHT PROX ICA PSV: -179.3 CM/SEC
BH CV XLRA MEAS RIGHT PROX SCLA PSV: 146.3 CM/SEC
BH CV XLRA MEAS RIGHT VERTEBRAL A EDV: -15.8 CM/SEC
BH CV XLRA MEAS RIGHT VERTEBRAL A PSV: -67.2 CM/SEC
LEFT ARM BP: NORMAL MMHG
RIGHT ARM BP: NORMAL MMHG

## 2025-06-05 PROCEDURE — 93880 EXTRACRANIAL BILAT STUDY: CPT

## 2025-07-01 ENCOUNTER — OFFICE VISIT (OUTPATIENT)
Dept: GASTROENTEROLOGY | Facility: CLINIC | Age: 60
End: 2025-07-01
Payer: COMMERCIAL

## 2025-07-01 ENCOUNTER — PREP FOR SURGERY (OUTPATIENT)
Dept: SURGERY | Facility: SURGERY CENTER | Age: 60
End: 2025-07-01
Payer: COMMERCIAL

## 2025-07-01 VITALS
HEART RATE: 66 BPM | HEIGHT: 69 IN | DIASTOLIC BLOOD PRESSURE: 90 MMHG | BODY MASS INDEX: 31.49 KG/M2 | OXYGEN SATURATION: 97 % | SYSTOLIC BLOOD PRESSURE: 130 MMHG | WEIGHT: 212.6 LBS | TEMPERATURE: 97.5 F

## 2025-07-01 DIAGNOSIS — Z80.0 FAMILY HISTORY OF ESOPHAGEAL CANCER: ICD-10-CM

## 2025-07-01 DIAGNOSIS — Z80.0 FAMILY HISTORY OF COLON CANCER: ICD-10-CM

## 2025-07-01 DIAGNOSIS — Z86.0101 PERSONAL HISTORY OF ADENOMATOUS AND SERRATED COLON POLYPS: ICD-10-CM

## 2025-07-01 DIAGNOSIS — K59.04 CHRONIC IDIOPATHIC CONSTIPATION: ICD-10-CM

## 2025-07-01 DIAGNOSIS — K62.5 RECTAL BLEEDING: Primary | ICD-10-CM

## 2025-07-01 DIAGNOSIS — K21.00 GASTROESOPHAGEAL REFLUX DISEASE WITH ESOPHAGITIS WITHOUT HEMORRHAGE: ICD-10-CM

## 2025-07-01 RX ORDER — SODIUM CHLORIDE, SODIUM LACTATE, POTASSIUM CHLORIDE, CALCIUM CHLORIDE 600; 310; 30; 20 MG/100ML; MG/100ML; MG/100ML; MG/100ML
30 INJECTION, SOLUTION INTRAVENOUS CONTINUOUS PRN
OUTPATIENT
Start: 2025-07-01 | End: 2025-07-01

## 2025-07-01 RX ORDER — SODIUM CHLORIDE 0.9 % (FLUSH) 0.9 %
3 SYRINGE (ML) INJECTION EVERY 12 HOURS SCHEDULED
OUTPATIENT
Start: 2025-07-01

## 2025-07-01 RX ORDER — SODIUM CHLORIDE 0.9 % (FLUSH) 0.9 %
10 SYRINGE (ML) INJECTION AS NEEDED
OUTPATIENT
Start: 2025-07-01

## 2025-07-01 RX ORDER — FEXOFENADINE HCL 60 MG/1
10 TABLET, FILM COATED ORAL DAILY
COMMUNITY

## 2025-07-01 NOTE — PATIENT INSTRUCTIONS
Schedule EGD and colonoscopy for further evaluation.     Continue Linzess.     Continue pantoprazole.     Call for any new or worsening symptoms or failure to improve.

## 2025-07-01 NOTE — PROGRESS NOTES
"Chief Complaint   Patient presents with    Hospital follow-up, rectal bleeding           History of Present Illness  59-year-old male with history of CVA on plavix, diabetes, carotic stenosis, hemorrhoids, anal fissures, appendectomy, hernia repair here for hospital follow-up.  He was seen during hospitalization about 2 months ago with rectal bleeding with clots.  His hemoglobin was normal during hospitalization.  He was given a bowel prep and bleeding resolved.  He was recommended  a daily bowel regimen to prevent constipation with use of Ozempic.    Patient reports he has been doing pretty well.  He does continue to have a small amount of rectal bleeding, a few times per week associated with wiping.  The blood is bright red and not to the extent that he was having while in the hospital.  He feels that this is more related to anal fissures.  He has been taking Linzess 72 mcg each morning which has been very helpful.  At most, he will skip 3 days between a bowel movement where he was skipping 7 days previously.  He also recently switched from Ozempic to Mounjaro and feels this is less constipating.  Stools are loose to formed.    He has a family history of colon cancer in his grandfather.  Family history of esophageal cancer in his father.     Result Review :       COLONOSCOPY (09/28/2021 08:23) - colon polyps, multiple small and large mouth diverticula, internal/external hemorrhoids-recall 5 years  UPPER GI ENDOSCOPY (09/28/2021 08:23)-grade B esophagitis, gastritis  Tissue Pathology Exam (09/28/2021 08:33)-GE junction with reflux esophagitis and repair, negative for dysplasia, TA/HP colon polyps    Consults by Adia Lincoln APRN (05/01/2025 08:53)  Vital Signs:   /90 (BP Location: Left arm, Patient Position: Sitting, Cuff Size: Adult)   Pulse 66   Temp 97.5 °F (36.4 °C)   Ht 175.3 cm (69.02\")   Wt 96.4 kg (212 lb 9.6 oz)   SpO2 97%   BMI 31.38 kg/m²     Body mass index is 31.38 kg/m².   "   Physical Exam      Assessment and Plan    Diagnoses and all orders for this visit:    1. Rectal bleeding (Primary)    2. Chronic idiopathic constipation    3. Family history of esophageal cancer    4. Family history of colon cancer    5. Gastroesophageal reflux disease with esophagitis without hemorrhage    6. Personal history of adenomatous and serrated colon polyps         Discussion    Patient presents for hospital follow-up after hospitalization with large-volume rectal bleeding.  Hemoglobin was stable and rectal bleeding resolved after receiving bowel prep.  He does have a history of hemorrhoids, anal fissures and diverticulosis.  Suspect diverticular bleed which has now resolved.  He does experience occasional minor rectal bleeding which may be related to fissures versus hemorrhoids.    He will be due for colonoscopy next year, but given recent hospitalization with bleeding, will schedule earlier for further evaluation.  Add on EGD for his history of esophagitis and family history of esophageal cancer.  Evaluate for Samuel's.  Continues on low-dose PPI.    Continue Linzess daily which is controlling constipation well.  Suspect chronic idiopathic constipation exacerbated by GLP-1 use.          Follow Up   No follow-ups on file.    Patient Instructions   Schedule EGD and colonoscopy for further evaluation.     Continue Linzess.     Continue pantoprazole.     Call for any new or worsening symptoms or failure to improve.

## 2025-07-02 DIAGNOSIS — E11.9 TYPE 2 DIABETES MELLITUS WITHOUT COMPLICATION, WITHOUT LONG-TERM CURRENT USE OF INSULIN: ICD-10-CM

## 2025-07-02 RX ORDER — TIRZEPATIDE 5 MG/.5ML
5 INJECTION, SOLUTION SUBCUTANEOUS WEEKLY
Qty: 6 ML | Refills: 0 | Status: SHIPPED | OUTPATIENT
Start: 2025-07-02

## 2025-07-02 NOTE — PROGRESS NOTES
CC: Follow-up TIA, carotid disease    HPI:  Ramirez Pena is a  60 y.o.  right-handed male with hyperlipidemia, borderline diabetes, and ROBYN on CPAP who is being seen [facility] today for TIA and carotid stenosis.    In March 2020 an episode of transient monocular vision loss in the right eye lasting for 10 minutes.  CT head/neck showed mild left vertebral artery narrowing, 50% stenosis in the right ICA secondary to calcified noncalcified plaque.  2D echo was unremarkable.  He was diagnosed with diabetes due to hemoglobin A1c of 7.1% and hyperlipidemia with LDL of 127.    Most recent carotid ultrasound in June 2025 showed less than 50% stenosis bilaterally.    No TIA or stroke symptoms in the last year.  He continues to take Plavix 75 mg daily and Lipitor 80 mg daily.  Plavix will be held for an upcoming colonoscopy to evaluate recent episode of BRBPR.  He continues to use his CPAP regularly and he tries to walk daily.  He last had labs completed May 2025: CBC and CMP were unremarkable.    Past Medical History:   Diagnosis Date    Allergic 8/2018    Amaurosis fugax of right eye 03/18/2020    Arthritis 10/23/2024    In big toes.    Carotid stenosis 03/19/2020    Class 1 obesity due to excess calories without serious comorbidity with body mass index (BMI) of 31.0 to 31.9 in adult 12/10/2020    Clotting disorder 5/8/2025    Colon polyp     COVID 01/2022    Depression     Elevated cholesterol     Environmental allergies     Fissure, anal 1998    GERD (gastroesophageal reflux disease)     History of TIAs 2020    Hypertension     NO MEDS    Inguinal hernia     Low back pain 10/1986    ROBYN on CPAP     AHI 12.5    PONV (postoperative nausea and vomiting)     Rectal bleeding 1998    Seasonal allergies     Stroke     Type 2 diabetes mellitus 03/19/2020         Past Surgical History:   Procedure Laterality Date    APPENDECTOMY Bilateral 2005    COLONOSCOPY N/A 09/28/2021    Procedure: COLONOSCOPY FOR SCREENING;  Surgeon: Mirella  Nato RIOJAS MD;  Location: [facility] [place] MAIN OR;  Service: Gastroenterology;  Laterality: N/A;  hemorrhoids , diverticulosis and polyps    CYST REMOVAL      ON HEAD    ENDOSCOPY N/A 09/28/2021    Procedure: ESOPHAGOGASTRODUODENOSCOPY;  Surgeon: Nato Vu MD;  Location: [facility] [place] MAIN OR;  Service: Gastroenterology;  Laterality: N/A;  Esophagitis and Gastritis    HERNIA REPAIR  2023    INGUINAL HERNIA REPAIR Left 12/07/2021    Procedure: left INGUINAL HERNIA REPAIR LAPAROSCOPIC WITH DAVINCI ROBOT;  Surgeon: Luis Fernando Son MD;  Location: [facility] MAIN OR;  Service: Robotics - DaVinci;  Laterality: Left;    KNEE ACL RECONSTRUCTION Right 2011           Current Outpatient Medications:     atorvastatin (LIPITOR) 80 MG tablet, Take 1 tablet by mouth Every Night., Disp: 90 tablet, Rfl: 0    azelastine (ASTELIN) 0.1 % nasal spray, Instill 2 sprays into the nostril(s) as directed by provider 2 (Two) Times a Day., Disp: 90 mL, Rfl: 3    clopidogrel (PLAVIX) 75 MG tablet, Take 1 tablet by mouth Daily., Disp: 90 tablet, Rfl: 3    EPINEPHrine (EPIPEN) 0.3 MG/0.3ML solution auto-injector injection, Use as directed., Disp: 2 each, Rfl: 3    fexofenadine (ALLEGRA) 60 MG tablet, Take 10 mg by mouth Daily., Disp: , Rfl:     fluticasone (FLONASE) 50 MCG/ACT nasal spray, Administer 2 sprays into the nostril(s) as directed by provider Daily., Disp: , Rfl:     linaclotide (Linzess) 72 MCG capsule capsule, Take 1 capsule by mouth Every Morning Before Breakfast., Disp: 90 capsule, Rfl: 0    lisinopril-hydrochlorothiazide (PRINZIDE,ZESTORETIC) 10-12.5 MG per tablet, Take 1 tablet by mouth Daily. (Patient taking differently: Take 1 tablet by mouth Every Night.), Disp: 90 tablet, Rfl: 1    montelukast (SINGULAIR) 10 MG tablet, Take 1 tablet by mouth Daily. (Patient taking differently: Take 1 tablet by mouth Every Night.), Disp: 90 tablet, Rfl: 3    Naproxen Sodium (ALEVE PO), Take 2 capsules by mouth As Needed., Disp: , Rfl:     pantoprazole  (Protonix) 20 MG EC tablet, Take 1 tablet by mouth Daily. (Patient taking differently: Take 1 tablet by mouth Every Night.), Disp: 90 tablet, Rfl: 3    terbinafine (lamiSIL) 250 MG tablet, Take 1 tablet by mouth Daily. Per provider: patient needs to keep appointment on 02/02/25., Disp: 90 tablet, Rfl: 0    Tirzepatide (Mounjaro) 5 MG/0.5ML solution auto-injector, Inject 5 mg under the skin into the appropriate area as directed 1 (One) Time Per Week., Disp: 6 mL, Rfl: 0    triamcinolone (KENALOG) 0.1 % cream, Apply topically to the appropriate area of UPPER EXTREMITIES as directed 2 (Two) Times a Day for 2-3 weeks AS NEEDED FOR FLARES, Disp: 30 g, Rfl: 0      Family History   Problem Relation Age of Onset    Cancer Mother         Bladder Cancer    Heart disease Mother     Hypertension Mother     Stroke Mother     Esophageal cancer Father         Passed away from this.    Cancer Father         Esophagus Cancer    Uterine cancer Paternal Grandmother     Cancer Paternal Grandmother         Uterus Cancer    Dementia Paternal Grandmother     Colon cancer Paternal Grandfather         Passed away from this    Prostate cancer Paternal Grandfather     Cancer Paternal Grandfather         Prostate Cancer    Stomach cancer Maternal Grandfather         I do not know first name.    Stroke Maternal Grandmother     Colon polyps Neg Hx     Crohn's disease Neg Hx     Irritable bowel syndrome Neg Hx     Ulcerative colitis Neg Hx     Malig Hyperthermia Neg Hx          Social History     Socioeconomic History    Marital status:    Tobacco Use    Smoking status: Light Smoker     Types: Cigars    Smokeless tobacco: Never    Tobacco comments:     once a week   Vaping Use    Vaping status: Never Used   Substance and Sexual Activity    Alcohol use: Yes     Comment: occasional not every day or week.    Drug use: Never    Sexual activity: Yes     Partners: Male     Birth control/protection: Condom, Partner of same sex         No Known  "Allergies        Physical Exam:  Vitals:    07/09/25 1249   BP: 110/72   Pulse: 70   SpO2: 97%   Weight: 97.3 kg (214 lb 8 oz)   Height: 175.3 cm (69\")     Orthostatic BP:    Body mass index is 31.68 kg/m².    General appearance: Well developed, well nourished, well groomed, alert and cooperative.   HEENT: Normocephalic.   Cardiac: Regular rate and rhythm. No murmurs.   Chest Exam: Clear to auscultation bilaterally, no wheezes, no rhonchi.  Extremities: Normal, no edema.     Higher integrative function: Oriented to time, place, person, intact recent and remote memory, attention span, concentration and language. Spontaneous speech, fund of vocabulary are normal.   Cranial: Visual fields intact bilaterally, extraocular moods full without nystagmus, PERRL, normal facial sensation, face symmetrical hearing intact, symmetric palate rise, tongue midline, no dysarthria.  Motor: Normal muscle strength, bulk, and tone in upper and lower extremities. No fasciculations, rigidity, spasticity or abnormal movements.   Sensation: Diffusely intact to LT in all extremities.   Station and gait: Normal gait and station.   Coordination: Finger to nose test showed no dysmetria.       Results:      Lab Results   Component Value Date    GLUCOSE 87 05/16/2025    BUN 5 (L) 05/16/2025    CREATININE 0.80 05/16/2025    EGFRIFNONA 109 12/03/2021    EGFRIFAFRI >150 10/06/2021    BCR 6 (L) 05/16/2025    CO2 20 05/16/2025    CALCIUM 8.9 05/16/2025    ALBUMIN 4.5 05/16/2025    AST 25 05/16/2025    ALT 35 05/16/2025       Lab Results   Component Value Date    WBC 5.5 05/16/2025    HGB 13.9 05/16/2025    HCT 41.1 05/16/2025    MCV 95 05/16/2025     05/16/2025         .  Lab Results   Component Value Date    RPR Non Reactive 05/16/2025         Lab Results   Component Value Date    TSH 1.740 04/08/2025    P7VPBGQ 8.2 03/21/2017         No results found for: \"DHHZOAXZ85\"      No results found for: \"FOLATE\"      Lab Results   Component Value Date "    HGBA1C 5.8 (H) 04/08/2025         Lab Results   Component Value Date    GLUCOSE 87 05/16/2025    BUN 5 (L) 05/16/2025    CREATININE 0.80 05/16/2025    EGFRIFNONA 109 12/03/2021    EGFRIFAFRI >150 10/06/2021    BCR 6 (L) 05/16/2025    K 4.1 05/16/2025    CO2 20 05/16/2025    CALCIUM 8.9 05/16/2025    ALBUMIN 4.5 05/16/2025    AST 25 05/16/2025    ALT 35 05/16/2025         Lab Results   Component Value Date    WBC 5.5 05/16/2025    HGB 13.9 05/16/2025    HCT 41.1 05/16/2025    MCV 95 05/16/2025     05/16/2025             Assessment/Plan:        Diagnoses and all orders for this visit:    1. Carotid stenosis, asymptomatic, right (Primary)  -     Duplex Carotid Ultrasound CAR; Future    2. History of TIA (transient ischemic attack)    Other orders  -     clopidogrel (PLAVIX) 75 MG tablet; Take 1 tablet by mouth Daily.  Dispense: 90 tablet; Refill: 3      Stroke prevention:  Continue Plavix 75 mg daily, refilled today  Blood pressure control to <130/80  Goal LDL <70-recommend high dose statins-continue Lipitor 80 mg daily   Serum glucose < 140  Continue regular exercise and regular CPAP use  Call 911 for stroke any stroke symptoms    Chronic disease has been stable over the last few years.  Will check next carotid ultrasound in 2 years, June 2026    Follow-up in 1 year or sooner if needed    Total time:>20 min        Dictated utilizing Dragon dictation.

## 2025-07-02 NOTE — TELEPHONE ENCOUNTER
Rx Refill Note  Requested Prescriptions     Pending Prescriptions Disp Refills    Tirzepatide (Mounjaro) 5 MG/0.5ML solution auto-injector 6 mL 0     Sig: Inject 5 mg under the skin into the appropriate area as directed 1 (One) Time Per Week.      Last office visit with prescribing clinician: 4/8/2025   Last telemedicine visit with prescribing clinician: Visit date not found   Next office visit with prescribing clinician: 7/18/2025                         Would you like a call back once the refill request has been completed: [] Yes [] No    If the office needs to give you a call back, can they leave a voicemail: [] Yes [] No    Can Boone MA  07/02/25, 11:53 EDT

## 2025-07-03 RX ORDER — ATORVASTATIN CALCIUM 80 MG/1
80 TABLET, FILM COATED ORAL NIGHTLY
Qty: 90 TABLET | Refills: 0 | Status: SHIPPED | OUTPATIENT
Start: 2025-07-03

## 2025-07-09 ENCOUNTER — TELEPHONE (OUTPATIENT)
Dept: NEUROLOGY | Facility: CLINIC | Age: 60
End: 2025-07-09

## 2025-07-09 ENCOUNTER — OFFICE VISIT (OUTPATIENT)
Dept: NEUROLOGY | Facility: CLINIC | Age: 60
End: 2025-07-09
Payer: COMMERCIAL

## 2025-07-09 VITALS
HEART RATE: 70 BPM | WEIGHT: 214.5 LBS | HEIGHT: 69 IN | DIASTOLIC BLOOD PRESSURE: 72 MMHG | BODY MASS INDEX: 31.77 KG/M2 | SYSTOLIC BLOOD PRESSURE: 110 MMHG | OXYGEN SATURATION: 97 %

## 2025-07-09 DIAGNOSIS — I65.21 CAROTID STENOSIS, ASYMPTOMATIC, RIGHT: Primary | ICD-10-CM

## 2025-07-09 DIAGNOSIS — Z86.73 HISTORY OF TIA (TRANSIENT ISCHEMIC ATTACK): ICD-10-CM

## 2025-07-09 PROCEDURE — 99213 OFFICE O/P EST LOW 20 MIN: CPT | Performed by: NURSE PRACTITIONER

## 2025-07-09 RX ORDER — CLOPIDOGREL BISULFATE 75 MG/1
75 TABLET ORAL DAILY
Qty: 90 TABLET | Refills: 3 | Status: SHIPPED | OUTPATIENT
Start: 2025-07-09

## 2025-07-09 NOTE — LETTER
July 9, 2025     IVETTE Casillas  [address]  [address]  [place] [id number]    Patient: Ramirez Pena   YOB: 1965   Date of Visit: 7/9/2025     Dear IVETTE Casillas:       Thank you for referring Ramirez Pena to me for evaluation. Below are the relevant portions of my assessment and plan of care.    If you have questions, please do not hesitate to call me. I look forward to following Ramirez along with you.         Sincerely,        IVETTE Neri        CC: No Recipients    Mely Winkler APRN  07/09/25 1324  Sign when Signing Visit  CC: Follow-up TIA, carotid disease    HPI:  Ramirez Pena is a  60 y.o.  right-handed male with hyperlipidemia, borderline diabetes, and ROBYN on CPAP who is being seen [facility] today for TIA and carotid stenosis.    In March 2020 an episode of transient monocular vision loss in the right eye lasting for 10 minutes.  CT head/neck showed mild left vertebral artery narrowing, 50% stenosis in the right ICA secondary to calcified noncalcified plaque.  2D echo was unremarkable.  He was diagnosed with diabetes due to hemoglobin A1c of 7.1% and hyperlipidemia with LDL of 127.    Most recent carotid ultrasound in June 2025 showed less than 50% stenosis bilaterally.    No TIA or stroke symptoms in the last year.  He continues to take Plavix 75 mg daily and Lipitor 80 mg daily.  Plavix will be held for an upcoming colonoscopy to evaluate recent episode of BRBPR.  He continues to use his CPAP regularly and he tries to walk daily.  He last had labs completed May 2025: CBC and CMP were unremarkable.    Past Medical History:   Diagnosis Date   • Allergic 8/2018   • Amaurosis fugax of right eye 03/18/2020   • Arthritis 10/23/2024    In big toes.   • Carotid stenosis 03/19/2020   • Class 1 obesity due to excess calories without serious comorbidity with body mass index (BMI) of 31.0 to 31.9 in adult 12/10/2020   • Clotting disorder  5/8/2025   • Colon polyp    • COVID 01/2022   • Depression    • Elevated cholesterol    • Environmental allergies    • Fissure, anal 1998   • GERD (gastroesophageal reflux disease)    • History of TIAs 2020   • Hypertension     NO MEDS   • Inguinal hernia    • Low back pain 10/1986   • ROBYN on CPAP     AHI 12.5   • PONV (postoperative nausea and vomiting)    • Rectal bleeding 1998   • Seasonal allergies    • Stroke    • Type 2 diabetes mellitus 03/19/2020         Past Surgical History:   Procedure Laterality Date   • APPENDECTOMY Bilateral 2005   • COLONOSCOPY N/A 09/28/2021    Procedure: COLONOSCOPY FOR SCREENING;  Surgeon: Nato Vu MD;  Location: [facility] MAIN OR;  Service: Gastroenterology;  Laterality: N/A;  hemorrhoids , diverticulosis and polyps   • CYST REMOVAL      ON HEAD   • ENDOSCOPY N/A 09/28/2021    Procedure: ESOPHAGOGASTRODUODENOSCOPY;  Surgeon: Nato Vu MD;  Location: [facility] MAIN OR;  Service: Gastroenterology;  Laterality: N/A;  Esophagitis and Gastritis   • HERNIA REPAIR  2023   • INGUINAL HERNIA REPAIR Left 12/07/2021    Procedure: left INGUINAL HERNIA REPAIR LAPAROSCOPIC WITH DAVINCI ROBOT;  Surgeon: Luis Fernando Son MD;  Location: [facility] MAIN OR;  Service: Robotics - DaVinci;  Laterality: Left;   • KNEE ACL RECONSTRUCTION Right 2011           Current Outpatient Medications:   •  atorvastatin (LIPITOR) 80 MG tablet, Take 1 tablet by mouth Every Night., Disp: 90 tablet, Rfl: 0  •  azelastine (ASTELIN) 0.1 % nasal spray, Instill 2 sprays into the nostril(s) as directed by provider 2 (Two) Times a Day., Disp: 90 mL, Rfl: 3  •  clopidogrel (PLAVIX) 75 MG tablet, Take 1 tablet by mouth Daily., Disp: 90 tablet, Rfl: 3  •  EPINEPHrine (EPIPEN) 0.3 MG/0.3ML solution auto-injector injection, Use as directed., Disp: 2 each, Rfl: 3  •  fexofenadine (ALLEGRA) 60 MG tablet, Take 10 mg by mouth Daily., Disp: , Rfl:   •  fluticasone (FLONASE) 50 MCG/ACT nasal spray, Administer 2 sprays into the  nostril(s) as directed by provider Daily., Disp: , Rfl:   •  linaclotide (Linzess) 72 MCG capsule capsule, Take 1 capsule by mouth Every Morning Before Breakfast., Disp: 90 capsule, Rfl: 0  •  lisinopril-hydrochlorothiazide (PRINZIDE,ZESTORETIC) 10-12.5 MG per tablet, Take 1 tablet by mouth Daily. (Patient taking differently: Take 1 tablet by mouth Every Night.), Disp: 90 tablet, Rfl: 1  •  montelukast (SINGULAIR) 10 MG tablet, Take 1 tablet by mouth Daily. (Patient taking differently: Take 1 tablet by mouth Every Night.), Disp: 90 tablet, Rfl: 3  •  Naproxen Sodium (ALEVE PO), Take 2 capsules by mouth As Needed., Disp: , Rfl:   •  pantoprazole (Protonix) 20 MG EC tablet, Take 1 tablet by mouth Daily. (Patient taking differently: Take 1 tablet by mouth Every Night.), Disp: 90 tablet, Rfl: 3  •  terbinafine (lamiSIL) 250 MG tablet, Take 1 tablet by mouth Daily. Per provider: patient needs to keep appointment on 02/02/25., Disp: 90 tablet, Rfl: 0  •  Tirzepatide (Mounjaro) 5 MG/0.5ML solution auto-injector, Inject 5 mg under the skin into the appropriate area as directed 1 (One) Time Per Week., Disp: 6 mL, Rfl: 0  •  triamcinolone (KENALOG) 0.1 % cream, Apply topically to the appropriate area of UPPER EXTREMITIES as directed 2 (Two) Times a Day for 2-3 weeks AS NEEDED FOR FLARES, Disp: 30 g, Rfl: 0      Family History   Problem Relation Age of Onset   • Cancer Mother         Bladder Cancer   • Heart disease Mother    • Hypertension Mother    • Stroke Mother    • Esophageal cancer Father         Passed away from this.   • Cancer Father         Esophagus Cancer   • Uterine cancer Paternal Grandmother    • Cancer Paternal Grandmother         Uterus Cancer   • Dementia Paternal Grandmother    • Colon cancer Paternal Grandfather         Passed away from this   • Prostate cancer Paternal Grandfather    • Cancer Paternal Grandfather         Prostate Cancer   • Stomach cancer Maternal Grandfather         I do not know first  "name.   • Stroke Maternal Grandmother    • Colon polyps Neg Hx    • Crohn's disease Neg Hx    • Irritable bowel syndrome Neg Hx    • Ulcerative colitis Neg Hx    • Malig Hyperthermia Neg Hx          Social History     Socioeconomic History   • Marital status:    Tobacco Use   • Smoking status: Light Smoker     Types: Cigars   • Smokeless tobacco: Never   • Tobacco comments:     once a week   Vaping Use   • Vaping status: Never Used   Substance and Sexual Activity   • Alcohol use: Yes     Comment: occasional not every day or week.   • Drug use: Never   • Sexual activity: Yes     Partners: Male     Birth control/protection: Condom, Partner of same sex         No Known Allergies        Physical Exam:  Vitals:    07/09/25 1249   BP: 110/72   Pulse: 70   SpO2: 97%   Weight: 97.3 kg (214 lb 8 oz)   Height: 175.3 cm (69\")     Orthostatic BP:    Body mass index is 31.68 kg/m².    General appearance: Well developed, well nourished, well groomed, alert and cooperative.   HEENT: Normocephalic.   Cardiac: Regular rate and rhythm. No murmurs.   Chest Exam: Clear to auscultation bilaterally, no wheezes, no rhonchi.  Extremities: Normal, no edema.     Higher integrative function: Oriented to time, place, person, intact recent and remote memory, attention span, concentration and language. Spontaneous speech, fund of vocabulary are normal.   Cranial: Visual fields intact bilaterally, extraocular moods full without nystagmus, PERRL, normal facial sensation, face symmetrical hearing intact, symmetric palate rise, tongue midline, no dysarthria.  Motor: Normal muscle strength, bulk, and tone in upper and lower extremities. No fasciculations, rigidity, spasticity or abnormal movements.   Sensation: Diffusely intact to LT in all extremities.   Station and gait: Normal gait and station.   Coordination: Finger to nose test showed no dysmetria.       Results:      Lab Results   Component Value Date    GLUCOSE 87 05/16/2025    BUN 5 " "(L) 05/16/2025    CREATININE 0.80 05/16/2025    EGFRIFNONA 109 12/03/2021    EGFRIFAFRI >150 10/06/2021    BCR 6 (L) 05/16/2025    CO2 20 05/16/2025    CALCIUM 8.9 05/16/2025    ALBUMIN 4.5 05/16/2025    AST 25 05/16/2025    ALT 35 05/16/2025       Lab Results   Component Value Date    WBC 5.5 05/16/2025    HGB 13.9 05/16/2025    HCT 41.1 05/16/2025    MCV 95 05/16/2025     05/16/2025         .  Lab Results   Component Value Date    RPR Non Reactive 05/16/2025         Lab Results   Component Value Date    TSH 1.740 04/08/2025    U7SYUVQ 8.2 03/21/2017         No results found for: \"APAYDSWO09\"      No results found for: \"FOLATE\"      Lab Results   Component Value Date    HGBA1C 5.8 (H) 04/08/2025         Lab Results   Component Value Date    GLUCOSE 87 05/16/2025    BUN 5 (L) 05/16/2025    CREATININE 0.80 05/16/2025    EGFRIFNONA 109 12/03/2021    EGFRIFAFRI >150 10/06/2021    BCR 6 (L) 05/16/2025    K 4.1 05/16/2025    CO2 20 05/16/2025    CALCIUM 8.9 05/16/2025    ALBUMIN 4.5 05/16/2025    AST 25 05/16/2025    ALT 35 05/16/2025         Lab Results   Component Value Date    WBC 5.5 05/16/2025    HGB 13.9 05/16/2025    HCT 41.1 05/16/2025    MCV 95 05/16/2025     05/16/2025             Assessment/Plan:        Diagnoses and all orders for this visit:    1. Carotid stenosis, asymptomatic, right (Primary)  -     Duplex Carotid Ultrasound CAR; Future    2. History of TIA (transient ischemic attack)    Other orders  -     clopidogrel (PLAVIX) 75 MG tablet; Take 1 tablet by mouth Daily.  Dispense: 90 tablet; Refill: 3      Stroke prevention:  Continue Plavix 75 mg daily, refilled today  Blood pressure control to <130/80  Goal LDL <70-recommend high dose statins-continue Lipitor 80 mg daily   Serum glucose < 140  Continue regular exercise and regular CPAP use  Call 911 for stroke any stroke symptoms    Chronic disease has been stable over the last few years.  Will check next carotid ultrasound in 2 years, " June 2026    Follow-up in 1 year or sooner if needed    Total time:>20 min        Dictated utilizing Dragon dictation.

## 2025-07-09 NOTE — TELEPHONE ENCOUNTER
Spoke w pt to inform him he could come in earlier for appt with beverley due to a last min cancellation

## 2025-07-15 ENCOUNTER — ANESTHESIA EVENT (OUTPATIENT)
Dept: SURGERY | Facility: SURGERY CENTER | Age: 60
End: 2025-07-15
Payer: COMMERCIAL

## 2025-07-15 ENCOUNTER — ANESTHESIA (OUTPATIENT)
Dept: SURGERY | Facility: SURGERY CENTER | Age: 60
End: 2025-07-15
Payer: COMMERCIAL

## 2025-07-15 ENCOUNTER — HOSPITAL ENCOUNTER (OUTPATIENT)
Facility: SURGERY CENTER | Age: 60
Setting detail: HOSPITAL OUTPATIENT SURGERY
Discharge: HOME | End: 2025-07-15
Attending: INTERNAL MEDICINE | Admitting: INTERNAL MEDICINE
Payer: COMMERCIAL

## 2025-07-15 VITALS
OXYGEN SATURATION: 99 % | WEIGHT: 212 LBS | DIASTOLIC BLOOD PRESSURE: 71 MMHG | BODY MASS INDEX: 31.4 KG/M2 | HEART RATE: 60 BPM | HEIGHT: 69 IN | SYSTOLIC BLOOD PRESSURE: 114 MMHG | TEMPERATURE: 98 F | RESPIRATION RATE: 16 BRPM

## 2025-07-15 DIAGNOSIS — K62.5 RECTAL BLEEDING: ICD-10-CM

## 2025-07-15 DIAGNOSIS — K21.00 GASTROESOPHAGEAL REFLUX DISEASE WITH ESOPHAGITIS WITHOUT HEMORRHAGE: ICD-10-CM

## 2025-07-15 DIAGNOSIS — Z86.0101 PERSONAL HISTORY OF ADENOMATOUS AND SERRATED COLON POLYPS: ICD-10-CM

## 2025-07-15 DIAGNOSIS — K59.04 CHRONIC IDIOPATHIC CONSTIPATION: ICD-10-CM

## 2025-07-15 LAB — GLUCOSE BLDC GLUCOMTR-MCNC: 94 MG/DL (ref 70–130)

## 2025-07-15 PROCEDURE — 25010000002 PROPOFOL 1000 MG/100ML EMULSION

## 2025-07-15 PROCEDURE — 88305 TISSUE EXAM BY PATHOLOGIST: CPT | Performed by: INTERNAL MEDICINE

## 2025-07-15 PROCEDURE — 25810000003 LACTATED RINGERS PER 1000 ML

## 2025-07-15 PROCEDURE — 45380 COLONOSCOPY AND BIOPSY: CPT | Performed by: INTERNAL MEDICINE

## 2025-07-15 PROCEDURE — 43239 EGD BIOPSY SINGLE/MULTIPLE: CPT | Performed by: INTERNAL MEDICINE

## 2025-07-15 PROCEDURE — 25010000002 LIDOCAINE 2% SOLUTION

## 2025-07-15 PROCEDURE — 25010000002 PROPOFOL 10 MG/ML EMULSION

## 2025-07-15 PROCEDURE — 25010000002 GLYCOPYRROLATE 0.2 MG/ML SOLUTION

## 2025-07-15 RX ORDER — LIDOCAINE HYDROCHLORIDE 20 MG/ML
INJECTION, SOLUTION INFILTRATION; PERINEURAL AS NEEDED
Status: DISCONTINUED | OUTPATIENT
Start: 2025-07-15 | End: 2025-07-15 | Stop reason: SURG

## 2025-07-15 RX ORDER — SODIUM CHLORIDE 0.9 % (FLUSH) 0.9 %
10 SYRINGE (ML) INJECTION AS NEEDED
Status: DISCONTINUED | OUTPATIENT
Start: 2025-07-15 | End: 2025-07-15 | Stop reason: HOSPADM

## 2025-07-15 RX ORDER — PROPOFOL 10 MG/ML
INJECTION, EMULSION INTRAVENOUS AS NEEDED
Status: DISCONTINUED | OUTPATIENT
Start: 2025-07-15 | End: 2025-07-15 | Stop reason: SURG

## 2025-07-15 RX ORDER — GLYCOPYRROLATE 0.2 MG/ML
INJECTION INTRAMUSCULAR; INTRAVENOUS AS NEEDED
Status: DISCONTINUED | OUTPATIENT
Start: 2025-07-15 | End: 2025-07-15 | Stop reason: SURG

## 2025-07-15 RX ORDER — SODIUM CHLORIDE 0.9 % (FLUSH) 0.9 %
3 SYRINGE (ML) INJECTION EVERY 12 HOURS SCHEDULED
Status: DISCONTINUED | OUTPATIENT
Start: 2025-07-15 | End: 2025-07-15 | Stop reason: HOSPADM

## 2025-07-15 RX ORDER — SODIUM CHLORIDE, SODIUM LACTATE, POTASSIUM CHLORIDE, CALCIUM CHLORIDE 600; 310; 30; 20 MG/100ML; MG/100ML; MG/100ML; MG/100ML
30 INJECTION, SOLUTION INTRAVENOUS CONTINUOUS PRN
Status: DISCONTINUED | OUTPATIENT
Start: 2025-07-15 | End: 2025-07-15 | Stop reason: HOSPADM

## 2025-07-15 RX ORDER — ONDANSETRON 2 MG/ML
4 INJECTION INTRAMUSCULAR; INTRAVENOUS ONCE
Status: DISCONTINUED | OUTPATIENT
Start: 2025-07-15 | End: 2025-07-15 | Stop reason: HOSPADM

## 2025-07-15 RX ADMIN — SODIUM CHLORIDE, POTASSIUM CHLORIDE, SODIUM LACTATE AND CALCIUM CHLORIDE 30 ML/HR: 600; 310; 30; 20 INJECTION, SOLUTION INTRAVENOUS at 07:03

## 2025-07-15 RX ADMIN — PROPOFOL 120 MG: 10 INJECTION, EMULSION INTRAVENOUS at 07:56

## 2025-07-15 RX ADMIN — GLYCOPYRROLATE 0.1 MG: 0.2 INJECTION, SOLUTION INTRAMUSCULAR; INTRAVENOUS at 07:56

## 2025-07-15 RX ADMIN — LIDOCAINE HYDROCHLORIDE 100 MG: 20 INJECTION, SOLUTION INFILTRATION; PERINEURAL at 07:56

## 2025-07-15 RX ADMIN — PROPOFOL 200 MCG/KG/MIN: 10 INJECTION, EMULSION INTRAVENOUS at 07:56

## 2025-07-15 NOTE — ANESTHESIA PREPROCEDURE EVALUATION
Anesthesia Evaluation     Patient summary reviewed   history of anesthetic complications:  PONV  NPO Solid Status: > 8 hours             Airway   Mallampati: II  No difficulty expected  Dental      Pulmonary    (+) ,sleep apnea on CPAP  Cardiovascular     (+) hypertension, hyperlipidemia,  carotid artery disease      Neuro/Psych  (+) TIA, CVA, psychiatric history  GI/Hepatic/Renal/Endo    (+) obesity, GERD, diabetes mellitus    Musculoskeletal     Abdominal    Substance History      OB/GYN          Other   arthritis,                 Anesthesia Plan    ASA 3     MAC       Anesthetic plan, risks, benefits, and alternatives have been provided, discussed and informed consent has been obtained with: patient.    CODE STATUS:

## 2025-07-15 NOTE — H&P
No chief complaint on file.      HPI  GERD  Rectal bleeding   H/o polyps         Problem List:    Patient Active Problem List   Diagnosis    Sore throat    Rash lower extremities    Postviral fatigue syndrome    Congestion of respiratory tract    Pansinusitis    ROBYN on CPAP    Amaurosis fugax of right eye    History of TIA (transient ischemic attack)    Allergies    Carotid stenosis    Class 1 obesity due to excess calories without serious comorbidity with body mass index (BMI) of 31.0 to 31.9 in adult    History of colon polyps    Encounter for screening for malignant neoplasm of colon    Family history of colon cancer    Family history of esophageal cancer    Left inguinal hernia    Class 1 obesity    Primary hypertension    BRBPR (bright red blood per rectum)    Hyponatremia    Chronic constipation    Screen for STD (sexually transmitted disease)    Rectal bleeding    Chronic idiopathic constipation    Gastroesophageal reflux disease with esophagitis without hemorrhage    Personal history of adenomatous and serrated colon polyps       Medical History:    Past Medical History:   Diagnosis Date    Allergic 8/2018    Amaurosis fugax of right eye 03/18/2020    Arthritis 10/23/2024    In big toes.    Carotid stenosis 03/19/2020    Class 1 obesity due to excess calories without serious comorbidity with body mass index (BMI) of 31.0 to 31.9 in adult 12/10/2020    Clotting disorder 5/8/2025    Colon polyp     COVID 01/2022    Depression     Elevated cholesterol     Environmental allergies     Fissure, anal 1998    GERD (gastroesophageal reflux disease)     History of TIAs 2020    Hypertension     NO MEDS    Inguinal hernia     Low back pain 10/1986    ROBYN on CPAP     AHI 12.5    PONV (postoperative nausea and vomiting)     Rectal bleeding 1998    Seasonal allergies     Stroke     Type 2 diabetes mellitus 03/19/2020        Social History:    Social History     Socioeconomic History    Marital status:    Tobacco  Use    Smoking status: Light Smoker     Types: Cigars    Smokeless tobacco: Never    Tobacco comments:     once a week   Vaping Use    Vaping status: Never Used   Substance and Sexual Activity    Alcohol use: Yes     Comment: occasional not every day or week.    Drug use: Never    Sexual activity: Yes     Partners: Male     Birth control/protection: Condom, Partner of same sex       Family History:   Family History   Problem Relation Age of Onset    Cancer Mother         Bladder Cancer    Heart disease Mother     Hypertension Mother     Stroke Mother     Esophageal cancer Father         Passed away from this.    Cancer Father         Esophagus Cancer    Uterine cancer Paternal Grandmother     Cancer Paternal Grandmother         Uterus Cancer    Dementia Paternal Grandmother     Colon cancer Paternal Grandfather         Passed away from this    Prostate cancer Paternal Grandfather     Cancer Paternal Grandfather         Prostate Cancer    Stomach cancer Maternal Grandfather         I do not know first name.    Stroke Maternal Grandmother     Colon polyps Neg Hx     Crohn's disease Neg Hx     Irritable bowel syndrome Neg Hx     Ulcerative colitis Neg Hx     Malig Hyperthermia Neg Hx        Surgical History:   Past Surgical History:   Procedure Laterality Date    APPENDECTOMY Bilateral 2005    COLONOSCOPY N/A 09/28/2021    Procedure: COLONOSCOPY FOR SCREENING;  Surgeon: Nato Vu MD;  Location: [facility] MAIN OR;  Service: Gastroenterology;  Laterality: N/A;  hemorrhoids , diverticulosis and polyps    CYST REMOVAL      ON HEAD    ENDOSCOPY N/A 09/28/2021    Procedure: ESOPHAGOGASTRODUODENOSCOPY;  Surgeon: Nato Vu MD;  Location: [facility] MAIN OR;  Service: Gastroenterology;  Laterality: N/A;  Esophagitis and Gastritis    HERNIA REPAIR  2023    INGUINAL HERNIA REPAIR Left 12/07/2021    Procedure: left INGUINAL HERNIA REPAIR LAPAROSCOPIC WITH DAVINCI ROBOT;  Surgeon: Luis Fernando Son MD;  Location: [facility]  MAIN OR;  Service: Robotics - Tracey;  Laterality: Left;    KNEE ACL RECONSTRUCTION Right 2011         Current Facility-Administered Medications:     lactated ringers infusion, 30 mL/hr, Intravenous, Continuous PRN, Latonia, Adia Sarah, APRN, Last Rate: 30 mL/hr at 07/15/25 0703, 30 mL/hr at 07/15/25 0703    sodium chloride 0.9 % flush 10 mL, 10 mL, Intravenous, PRN, Latonia, Adia Sarah, APRN    sodium chloride 0.9 % flush 3 mL, 3 mL, Intravenous, Q12H, Latonia, Adia Sarah, APRN    Allergies: No Known Allergies     The following portions of the patient's history were reviewed by me and updated as appropriate: review of systems, allergies, current medications, past family history, past medical history, past social history, past surgical history and problem list.    Vitals:    07/15/25 0654   BP: 125/95   Pulse: 61   Resp: 16   Temp: 98.4 °F (36.9 °C)   SpO2: 97%       PHYSICAL EXAM:    CONSTITUTIONAL:  today's vital signs reviewed by me  GASTROINTESTINAL: abdomen is soft nontender nondistended with normal active bowel sounds, no masses are appreciated    Assessment/ Plan  GERD  Rectal bleeding   H/o polyps    Egd and colonoscopy    Risks and benefits as well as alternatives to endoscopic evaluation were explained to the patient and they voiced understanding and wish to proceed.  These risks include but are not limited to the risk of bleeding, perforation, adverse reaction to sedation, and missed lesions.  The patient was given the opportunity to ask questions prior to the endoscopic procedure.

## 2025-07-15 NOTE — ANESTHESIA POSTPROCEDURE EVALUATION
"Patient: Ramirez Pena    Procedure Summary       Date: 07/15/25 Room / Location[facility]    Anesthesia Start: 0754 Anesthesia Stop: 0823    Procedures:       ESOPHAGOGASTRODUODENOSCOPY WITH BIOPSY      COLONOSCOPY to cecum with polypectomy Diagnosis:       Rectal bleeding      Chronic idiopathic constipation      Gastroesophageal reflux disease with esophagitis without hemorrhage      Personal history of adenomatous and serrated colon polyps      (Rectal bleeding [K62.5])      (Chronic idiopathic constipation [K59.04])      (Gastroesophageal reflux disease with esophagitis without hemorrhage [K21.00])      (Personal history of adenomatous and serrated colon polyps [Z86.0101])    Surgeons: Nato Vu MD Provider: Petros Javed MD    Anesthesia Type: MAC ASA Status: 3            Anesthesia Type: MAC    Vitals  Vitals Value Taken Time   /81 07/15/25 08:42   Temp 36.7 °C (98 °F) 07/15/25 08:22   Pulse 60 07/15/25 08:41   Resp 16 07/15/25 08:41   SpO2 99 % 07/15/25 08:41   Vitals shown include unfiled device data.        Post Anesthesia Care and Evaluation    Patient location during evaluation: bedside  Level of consciousness: awake  Pain management: adequate    Airway patency: patent    Cardiovascular status: acceptable  Respiratory status: acceptable    Comments: /71   Pulse 60   Temp 36.7 °C (98 °F) (Temporal)   Resp 16   Ht 175.3 cm (69\")   Wt 96.2 kg (212 lb)   SpO2 99%   BMI 31.31 kg/m²       "

## 2025-07-15 NOTE — ANESTHESIA POSTPROCEDURE EVALUATION
"Patient: Ramirez Pena    Procedure Summary       Date: 07/15/25 Room / [facility]    Anesthesia Start: 0754 Anesthesia Stop: 0823    Procedures:       ESOPHAGOGASTRODUODENOSCOPY WITH BIOPSY      COLONOSCOPY to cecum with polypectomy Diagnosis:       Rectal bleeding      Chronic idiopathic constipation      Gastroesophageal reflux disease with esophagitis without hemorrhage      Personal history of adenomatous and serrated colon polyps      (Rectal bleeding [K62.5])      (Chronic idiopathic constipation [K59.04])      (Gastroesophageal reflux disease with esophagitis without hemorrhage [K21.00])      (Personal history of adenomatous and serrated colon polyps [Z86.0101])    Surgeons: Nato Vu MD Provider: Petros Javed MD    Anesthesia Type: MAC ASA Status: 3            Anesthesia Type: MAC    Vitals  Vitals Value Taken Time   /80 07/15/25 08:29   Temp 36.7 °C (98 °F) 07/15/25 08:22   Pulse 61 07/15/25 08:30   Resp 16 07/15/25 08:22   SpO2 95 % 07/15/25 08:30   Vitals shown include unfiled device data.        Post Anesthesia Care and Evaluation    Patient location during evaluation: bedside  Level of consciousness: awake  Pain management: adequate    Airway patency: patent    Cardiovascular status: acceptable  Respiratory status: acceptable    Comments: /60   Pulse 70   Temp 36.7 °C (98 °F) (Temporal)   Resp 16   Ht 175.3 cm (69\")   Wt 96.2 kg (212 lb)   SpO2 94%   BMI 31.31 kg/m²       "

## 2025-07-16 DIAGNOSIS — I10 PRIMARY HYPERTENSION: ICD-10-CM

## 2025-07-16 LAB
CYTO UR: NORMAL
LAB AP CASE REPORT: NORMAL
PATH REPORT.FINAL DX SPEC: NORMAL
PATH REPORT.GROSS SPEC: NORMAL

## 2025-07-16 RX ORDER — LISINOPRIL AND HYDROCHLOROTHIAZIDE 10; 12.5 MG/1; MG/1
1 TABLET ORAL DAILY
Qty: 90 TABLET | Refills: 1 | Status: SHIPPED | OUTPATIENT
Start: 2025-07-16

## 2025-07-16 NOTE — TELEPHONE ENCOUNTER
Rx Refill Note  Requested Prescriptions     Pending Prescriptions Disp Refills    lisinopril-hydrochlorothiazide (PRINZIDE,ZESTORETIC) 10-12.5 MG per tablet 90 tablet 1     Sig: Take 1 tablet by mouth Daily.      Last office visit with prescribing clinician: 4/8/2025   Last telemedicine visit with prescribing clinician: Visit date not found   Next office visit with prescribing clinician: 7/18/2025                         Would you like a call back once the refill request has been completed: [] Yes [] No    If the office needs to give you a call back, can they leave a voicemail: [] Yes [] No    Can Boone MA  07/16/25, 10:23 EDT

## 2025-07-18 ENCOUNTER — OFFICE VISIT (OUTPATIENT)
Dept: FAMILY MEDICINE CLINIC | Facility: CLINIC | Age: 60
End: 2025-07-18
Payer: COMMERCIAL

## 2025-07-18 VITALS
HEART RATE: 70 BPM | HEIGHT: 69 IN | DIASTOLIC BLOOD PRESSURE: 86 MMHG | WEIGHT: 217 LBS | OXYGEN SATURATION: 97 % | SYSTOLIC BLOOD PRESSURE: 146 MMHG | BODY MASS INDEX: 32.14 KG/M2

## 2025-07-18 DIAGNOSIS — E11.9 TYPE 2 DIABETES MELLITUS WITHOUT COMPLICATION, WITHOUT LONG-TERM CURRENT USE OF INSULIN: Primary | ICD-10-CM

## 2025-07-18 DIAGNOSIS — K59.03 DRUG-INDUCED CONSTIPATION: ICD-10-CM

## 2025-07-18 DIAGNOSIS — K62.5 RECTAL BLEEDING: ICD-10-CM

## 2025-07-18 DIAGNOSIS — R03.0 ELEVATED BLOOD PRESSURE READING: ICD-10-CM

## 2025-07-18 NOTE — PROGRESS NOTES
"Chief Complaint  Hospital Follow Up Visit    Subjective        Ramirez Pena presents to [facility]  History of Present Illness  History of Present Illness  The patient presents for evaluation of gastrointestinal bleeding, diabetes mellitus, and elevated blood pressure.    He recently experienced a severe episode of rectal bleeding, which he initially mistook for constipation. The bleeding was so profuse that it filled the toilet bowl with blood. He has a history of diverticulitis and internal hemorrhoids, both of which can cause bleeding. Upon contacting his nurse, he was advised to seek immediate medical attention at the emergency room. The ER physician confirmed the presence of blood in his stool. Despite being placed on a liquid diet, he was unable to provide a stool sample. A gastroenterologist administered an enema, which resulted in a semi-normal bowel movement. He was discharged the following day and has been managing well since then. He is currently taking Linzess, which has been effective in controlling his symptoms. He also reports occasional diarrhea, but this is not a daily occurrence.    He has transitioned from Ozempic to Mounjaro 5 mg and is tolerating it well. He believes that weight loss could alleviate some of his aches and pains. He is considering scheduling a colonoscopy to investigate the cause of the bleeding.    His blood pressure is slightly elevated today at 146/86, which he attributes to recent stressors.    Social History:  Occupations: Works from home    Objective   Vital Signs:  /86 (BP Location: Right arm, Patient Position: Sitting, Cuff Size: Large Adult)   Pulse 70   Ht 175.3 cm (69\")   Wt 98.4 kg (217 lb)   SpO2 97%   BMI 32.05 kg/m²   Estimated body mass index is 32.05 kg/m² as calculated from the following:    Height as of this encounter: 175.3 cm (69\").    Weight as of this encounter: 98.4 kg (217 lb).               Physical " Exam  Constitutional:       General: He is not in acute distress.     Appearance: He is well-developed. He is not diaphoretic.   Cardiovascular:      Rate and Rhythm: Normal rate and regular rhythm.      Heart sounds: Normal heart sounds. No murmur heard.     No friction rub. No gallop.   Pulmonary:      Effort: Pulmonary effort is normal. No respiratory distress.      Breath sounds: Normal breath sounds. No wheezing or rales.   Musculoskeletal:      Cervical back: Neck supple.   Skin:     General: Skin is warm and dry.   Neurological:      Mental Status: He is alert and oriented to person, place, and time.       Physical Exam      Result Review :          Results  Labs   - A1c: 04/2025, Normal    Assessment & Plan  1. Gastrointestinal bleeding.  - Experienced significant rectal bleeding, leading to hospitalization.  - Likely due to diverticulitis and internal hemorrhoids.  - Treated with an enema and placed on a liquid diet during hospital stay.  - Currently on Linzess, which has effectively managed symptoms; no further issues with constipation or diarrhea.    2. Diabetes mellitus.  - Last A1c level was within the normal range in 04/2025.  - Switched from Ozempic to Mounjaro and is tolerating the medication well.  - Dosage of Mounjaro will be increased to 7.5 mg for a 30-day trial period; advised to alternate between 5 mg and 7.5 mg doses until current supply of 5 mg is exhausted.  - Prescription for Mounjaro 7.5 mg will be sent to pharmacy; CBC, CMP, and A1c test will be ordered to monitor condition. If tolerated well, the increased dose will be maintained; otherwise, revert to 5 mg dose.    3. Elevated blood pressure.  - Blood pressure readings have been consistently normal, except for a slight elevation today at 146/86.  - Likely due to stress.  - Continue monitoring blood pressure and address any persistent elevations.           Assessment and Plan     Diagnoses and all orders for this visit:    1. Type 2  diabetes mellitus without complication, without long-term current use of insulin (Primary)  -     Hemoglobin A1c; Future  -     Comprehensive metabolic panel; Future    2. Rectal bleeding    3. Drug-induced constipation    4. Elevated blood pressure reading    Other orders  -     Tirzepatide 7.5 MG/0.5ML solution auto-injector; Inject 7.5 mg under the skin into the appropriate area as directed 1 (One) Time Per Week.  Dispense: 2 mL; Refill: 2             Follow Up     No follow-ups on file.  Patient was given instructions and counseling regarding his condition or for health maintenance advice. Please see specific information pulled into the AVS if appropriate.       Patient or patient representative verbalized consent for the use of Ambient Listening during the visit with  IVETTE Welch for chart documentation. 7/18/2025  17:09 EDT

## 2025-07-21 ENCOUNTER — LAB (OUTPATIENT)
Dept: LAB | Facility: HOSPITAL | Age: 60
End: 2025-07-21
Payer: COMMERCIAL

## 2025-07-21 PROCEDURE — 83036 HEMOGLOBIN GLYCOSYLATED A1C: CPT | Performed by: NURSE PRACTITIONER

## 2025-07-21 PROCEDURE — 80053 COMPREHEN METABOLIC PANEL: CPT | Performed by: NURSE PRACTITIONER

## 2025-07-25 RX ORDER — PANTOPRAZOLE SODIUM 20 MG/1
20 TABLET, DELAYED RELEASE ORAL DAILY
Qty: 90 TABLET | Refills: 0 | Status: SHIPPED | OUTPATIENT
Start: 2025-07-25 | End: 2026-07-25

## 2025-07-28 RX ORDER — LINACLOTIDE 72 UG/1
72 CAPSULE, GELATIN COATED ORAL
Qty: 90 CAPSULE | Refills: 0 | Status: CANCELLED | OUTPATIENT
Start: 2025-07-28

## (undated) DEVICE — SOL NACL 0.9PCT 1000ML

## (undated) DEVICE — GOWN PROC ENDOARMOR GI LVL3 HY/SHLD UNIV

## (undated) DEVICE — Device

## (undated) DEVICE — PENCL ES MEGADINE EZ/CLEAN BUTN W/HOLSTR 10FT

## (undated) DEVICE — FLEX ADVANTAGE 1500CC: Brand: FLEX ADVANTAGE

## (undated) DEVICE — BNDG ADHS PLSTC 1X3IN LF

## (undated) DEVICE — BLCK/BITE BLOX W/DENTL/RIM W/STRAP 54F

## (undated) DEVICE — SUT MNCRYL PLS ANTIB UD 4/0 PS2 18IN

## (undated) DEVICE — BLADELESS OBTURATOR: Brand: WECK VISTA

## (undated) DEVICE — CANN NASL CO2 TRULINK W/O2 A/

## (undated) DEVICE — VISUALIZATION SYSTEM: Brand: CLEARIFY

## (undated) DEVICE — 3M™ STERI-STRIP™ COMPOUND BENZOIN TINCTURE 40 BAGS/CARTON 4 CARTONS/CASE C1544: Brand: 3M™ STERI-STRIP™

## (undated) DEVICE — GLV SURG BIOGEL LTX PF 8 1/2

## (undated) DEVICE — KT ORCA ORCAPOD DISP STRL

## (undated) DEVICE — CANN O2 ETCO2 FITS ALL CONN CO2 SMPL A/ 7IN DISP LF

## (undated) DEVICE — VIAL FORMLN CAP 10PCT 20ML

## (undated) DEVICE — BITEBLOCK OMNI BLOC

## (undated) DEVICE — ADAPT CLN SCPE ENDO PORPOISE BX/50 DISP

## (undated) DEVICE — SINGLE-USE BIOPSY FORCEPS: Brand: RADIAL JAW 4

## (undated) DEVICE — COVER,MAYO STAND,STERILE: Brand: MEDLINE

## (undated) DEVICE — LOU GENERAL ROBOT: Brand: MEDLINE INDUSTRIES, INC.

## (undated) DEVICE — SOL ANTISTICK CAUTRY ELECTROLUBE LF

## (undated) DEVICE — LAPAROSCOPIC SMOKE FILTRATION SYSTEM: Brand: PALL LAPAROSHIELD® PLUS LAPAROSCOPIC SMOKE FILTRATION SYSTEM

## (undated) DEVICE — VIOLET BRAIDED (POLYGLACTIN 910), SYNTHETIC ABSORBABLE SUTURE: Brand: COATED VICRYL

## (undated) DEVICE — COLUMN DRAPE

## (undated) DEVICE — MSK ENDO PORT O2 POM ELITE CURAPLEX A/

## (undated) DEVICE — TIP COVER ACCESSORY

## (undated) DEVICE — 3M™ STERI-STRIP™ REINFORCED ADHESIVE SKIN CLOSURES, R1547, 1/2 IN X 4 IN (12 MM X 100 MM), 6 STRIPS/ENVELOPE: Brand: 3M™ STERI-STRIP™

## (undated) DEVICE — ARM DRAPE

## (undated) DEVICE — CANNULA SEAL